# Patient Record
Sex: MALE | Race: WHITE | NOT HISPANIC OR LATINO
[De-identification: names, ages, dates, MRNs, and addresses within clinical notes are randomized per-mention and may not be internally consistent; named-entity substitution may affect disease eponyms.]

---

## 2021-06-21 PROBLEM — Z00.00 ENCOUNTER FOR PREVENTIVE HEALTH EXAMINATION: Status: ACTIVE | Noted: 2021-06-21

## 2021-06-22 ENCOUNTER — APPOINTMENT (OUTPATIENT)
Dept: GASTROENTEROLOGY | Facility: CLINIC | Age: 86
End: 2021-06-22
Payer: MEDICARE

## 2021-06-22 DIAGNOSIS — Z80.3 FAMILY HISTORY OF MALIGNANT NEOPLASM OF BREAST: ICD-10-CM

## 2021-06-22 DIAGNOSIS — Z86.79 PERSONAL HISTORY OF OTHER DISEASES OF THE CIRCULATORY SYSTEM: ICD-10-CM

## 2021-06-22 DIAGNOSIS — Z78.9 OTHER SPECIFIED HEALTH STATUS: ICD-10-CM

## 2021-06-22 DIAGNOSIS — Z87.898 PERSONAL HISTORY OF OTHER SPECIFIED CONDITIONS: ICD-10-CM

## 2021-06-22 DIAGNOSIS — Z85.46 PERSONAL HISTORY OF MALIGNANT NEOPLASM OF PROSTATE: ICD-10-CM

## 2021-06-22 DIAGNOSIS — R19.4 CHANGE IN BOWEL HABIT: ICD-10-CM

## 2021-06-22 DIAGNOSIS — Z86.010 PERSONAL HISTORY OF COLONIC POLYPS: ICD-10-CM

## 2021-06-22 PROCEDURE — 99203 OFFICE O/P NEW LOW 30 MIN: CPT

## 2021-06-22 RX ORDER — TAMSULOSIN HYDROCHLORIDE 0.4 MG/1
CAPSULE ORAL
Refills: 0 | Status: ACTIVE | COMMUNITY

## 2021-06-22 RX ORDER — NEBIVOLOL HYDROCHLORIDE 20 MG/1
TABLET ORAL
Refills: 0 | Status: ACTIVE | COMMUNITY

## 2021-06-23 PROBLEM — R19.4 CHANGE IN BOWEL HABITS: Status: ACTIVE | Noted: 2021-06-23

## 2021-06-23 NOTE — REASON FOR VISIT
[Initial Evaluation] : an initial evaluation [FreeTextEntry1] : History of colonic polyps, change in bowel habits

## 2021-06-23 NOTE — HISTORY OF PRESENT ILLNESS
[FreeTextEntry1] : The patient is an 85-year-old man with a history of prostate cancer treated with CyberKnife who has a history of colonic polyps.  His last colonoscopy was in May 2016.  He feels well denying abdominal pain, nausea, vomiting, heartburn, dysphagia.  He has 1 bowel movement a day, which she notes is harder to pass and sometimes thin.  He denies melena or bright red blood per rectum.  The patient has gained 7 to 8 pounds since the pandemic began.  The patient has not been admitted to the hospital in the past year and denies any cardiac issues.

## 2021-06-23 NOTE — ASSESSMENT
[FreeTextEntry1] : Patient with a history of colonic polyps and a change in bowel habits with stools that are harder to pass and sometimes thin.\par \par We discussed the pros and cons of proceeding with colonoscopy at the patient's age.  The patient wishes to proceed and he appears to be in good physical condition.  A colonoscopy has been scheduled. The risks, benefits, alternatives, and limitations of the procedure, including the possibility of missed lesions, were explained.

## 2021-06-23 NOTE — CONSULT LETTER
[FreeTextEntry1] : Dear Dr. Mehul Jaimes,\[facility] \[facility] I had the pleasure of seeing your patient CELESTINO BATISTA in the office today.  My office note is attached. PLEASE READ THE "ASSESSMENT" SECTION OF THE NOTE TO SEE MY IMPRESSION AND PLAN.\par \par Thank you very much for allowing me to participate in the care of your patient.\par \[facility] Sincerely,\[facility] \[facility] Willis Latham M.D., FAC, FACP\[facility] Director, Celiac Program at [facility]  of Medicine\[facility] and [employer] at [facility] Practice Director,\[facility] - Gastroenterology/Internal Medicine at [address]\[place] [address]\[facility] Tel: [phone]\[facility] Email: [email or web address] \[facility] \[facility] The attached note has been created using a voice recognition system (Dragon).  There may be some misspellings and typos.  Please call my office if you have any issues or questions.

## 2021-09-29 DIAGNOSIS — Z20.822 CONTACT WITH AND (SUSPECTED) EXPOSURE TO COVID-19: ICD-10-CM

## 2021-09-29 DIAGNOSIS — Z01.818 ENCOUNTER FOR OTHER PREPROCEDURAL EXAMINATION: ICD-10-CM

## 2021-10-08 ENCOUNTER — APPOINTMENT (OUTPATIENT)
Dept: DISASTER EMERGENCY | Facility: CLINIC | Age: 86
End: 2021-10-08

## 2021-10-11 LAB — SARS-COV-2 N GENE NPH QL NAA+PROBE: NOT DETECTED

## 2021-10-12 ENCOUNTER — APPOINTMENT (OUTPATIENT)
Dept: GASTROENTEROLOGY | Facility: AMBULATORY MEDICAL SERVICES | Age: 86
End: 2021-10-12
Payer: MEDICARE

## 2021-10-12 PROCEDURE — 45385 COLONOSCOPY W/LESION REMOVAL: CPT

## 2021-12-20 ENCOUNTER — APPOINTMENT (OUTPATIENT)
Dept: GASTROENTEROLOGY | Facility: CLINIC | Age: 86
End: 2021-12-20
Payer: MEDICARE

## 2021-12-20 VITALS
SYSTOLIC BLOOD PRESSURE: 120 MMHG | HEART RATE: 88 BPM | TEMPERATURE: 98.2 F | OXYGEN SATURATION: 97 % | BODY MASS INDEX: 27.6 KG/M2 | DIASTOLIC BLOOD PRESSURE: 70 MMHG | HEIGHT: 62 IN | WEIGHT: 150 LBS

## 2021-12-20 DIAGNOSIS — K57.30 DIVERTICULOSIS OF LARGE INTESTINE W/OUT PERFORATION OR ABSCESS W/OUT BLEEDING: ICD-10-CM

## 2021-12-20 DIAGNOSIS — D12.6 BENIGN NEOPLASM OF COLON, UNSPECIFIED: ICD-10-CM

## 2021-12-20 DIAGNOSIS — K59.00 CONSTIPATION, UNSPECIFIED: ICD-10-CM

## 2021-12-20 DIAGNOSIS — K64.4 RESIDUAL HEMORRHOIDAL SKIN TAGS: ICD-10-CM

## 2021-12-20 DIAGNOSIS — K64.8 OTHER HEMORRHOIDS: ICD-10-CM

## 2021-12-20 PROCEDURE — 99213 OFFICE O/P EST LOW 20 MIN: CPT

## 2021-12-20 RX ORDER — SODIUM SULFATE, MAGNESIUM SULFATE, AND POTASSIUM CHLORIDE 17.75; 2.7; 2.25 G/1; G/1; G/1
1479-225-188 TABLET ORAL
Qty: 1 | Refills: 0 | Status: DISCONTINUED | COMMUNITY
Start: 2021-09-29 | End: 2021-12-20

## 2021-12-20 RX ORDER — SODIUM SULFATE, POTASSIUM SULFATE, MAGNESIUM SULFATE 17.5; 3.13; 1.6 G/ML; G/ML; G/ML
17.5-3.13-1.6 SOLUTION, CONCENTRATE ORAL
Qty: 1 | Refills: 0 | Status: DISCONTINUED | COMMUNITY
Start: 2021-06-22 | End: 2021-12-20

## 2021-12-20 NOTE — ASSESSMENT
[FreeTextEntry1] : Patient with adenomatous colonic polyps and diverticulosis.  He has occasional constipation for which he takes MiraLAX and prune juice intermittently.\par \par Information was given to the patient regarding diverticulosis and a high-fiber diet.\par \par Given the patient's age, he will not require any further colonoscopic surveillance.\par \par Patient was advised to continue using MiraLAX and prune juice as needed.\par \par Patient will return to see me in 1 year or sooner if needed.\par \par \par Plan from 6/22/2021 - Patient with a history of colonic polyps and a change in bowel habits with stools that are harder to pass and sometimes thin.\par \par We discussed the pros and cons of proceeding with colonoscopy at the patient's age.  The patient wishes to proceed and he appears to be in good physical condition.  A colonoscopy has been scheduled. The risks, benefits, alternatives, and limitations of the procedure, including the possibility of missed lesions, were explained.

## 2021-12-20 NOTE — CONSULT LETTER
[FreeTextEntry1] : Dear Dr. Mehul Jaimes,\[facility] \[facility] I had the pleasure of seeing your patient CELESTINO BATISTA in the office today.  My office note is attached. PLEASE READ THE "ASSESSMENT" SECTION OF THE NOTE TO SEE MY IMPRESSION AND PLAN.\par \par Thank you very much for allowing me to participate in the care of your patient.\par \[facility] Sincerely,\[facility] \[facility] Willis Latham M.D., FAC, FACP\[facility] Director, Celiac Program at [facility]  of Medicine\[facility] and [employer] at [facility] Practice Director,\[facility] - Gastroenterology/Internal Medicine at [address]\[place] [address]\[facility] Tel: [phone]\[facility] Email: [email or web address] \[facility] \[facility] The attached note has been created using a voice recognition system (Dragon).  There may be some misspellings and typos.  Please call my office if you have any issues or questions.

## 2021-12-20 NOTE — HISTORY OF PRESENT ILLNESS
[FreeTextEntry1] : We reviewed the patient's colonoscopy performed on October 12, 2021.  The examination was significant for removal of 2 adenomatous colonic polyps.  The patient also has moderate pandiverticulosis as well as internal and external hemorrhoids which are asymptomatic.  The patient feels well.  He gets occasional mild constipation for which he takes intermittent MiraLAX and occasional prune juice.  The patient denies abdominal pain, melena, bright red blood per rectum.\par \par \par Note from 6/22/2021 - The patient is an 85-year-old man with a history of prostate cancer treated with CyberKnife who has a history of colonic polyps.  His last colonoscopy was in May 2016.  He feels well denying abdominal pain, nausea, vomiting, heartburn, dysphagia.  He has 1 bowel movement a day, which she notes is harder to pass and sometimes thin.  He denies melena or bright red blood per rectum.  The patient has gained 7 to 8 pounds since the pandemic began.  The patient has not been admitted to the hospital in the past year and denies any cardiac issues.

## 2022-11-02 ENCOUNTER — NON-APPOINTMENT (OUTPATIENT)
Age: 87
End: 2022-11-02

## 2022-12-29 ENCOUNTER — INPATIENT (INPATIENT)
Facility: HOSPITAL | Age: 87
LOS: 3 days | Discharge: HOME HEALTH | End: 2023-01-02
Attending: INTERNAL MEDICINE | Admitting: INTERNAL MEDICINE
Payer: MEDICARE

## 2022-12-29 VITALS
TEMPERATURE: 98 F | DIASTOLIC BLOOD PRESSURE: 68 MMHG | OXYGEN SATURATION: 98 % | SYSTOLIC BLOOD PRESSURE: 124 MMHG | RESPIRATION RATE: 16 BRPM | HEART RATE: 84 BPM

## 2022-12-29 PROCEDURE — 99285 EMERGENCY DEPT VISIT HI MDM: CPT | Mod: GC

## 2022-12-29 PROCEDURE — 93010 ELECTROCARDIOGRAM REPORT: CPT

## 2022-12-29 RX ORDER — PIPERACILLIN AND TAZOBACTAM 4; .5 G/20ML; G/20ML
3.38 INJECTION, POWDER, LYOPHILIZED, FOR SOLUTION INTRAVENOUS ONCE
Refills: 0 | Status: DISCONTINUED | OUTPATIENT
Start: 2022-12-29 | End: 2022-12-29

## 2022-12-29 RX ORDER — VANCOMYCIN HCL 1 G
1000 VIAL (EA) INTRAVENOUS ONCE
Refills: 0 | Status: COMPLETED | OUTPATIENT
Start: 2022-12-29 | End: 2022-12-29

## 2022-12-29 RX ORDER — ACETAMINOPHEN 500 MG
975 TABLET ORAL ONCE
Refills: 0 | Status: COMPLETED | OUTPATIENT
Start: 2022-12-29 | End: 2022-12-29

## 2022-12-29 RX ORDER — PIPERACILLIN AND TAZOBACTAM 4; .5 G/20ML; G/20ML
3.38 INJECTION, POWDER, LYOPHILIZED, FOR SOLUTION INTRAVENOUS EVERY 8 HOURS
Refills: 0 | Status: DISCONTINUED | OUTPATIENT
Start: 2022-12-29 | End: 2023-01-01

## 2022-12-29 RX ADMIN — Medication 250 MILLIGRAM(S): at 23:46

## 2022-12-29 RX ADMIN — Medication 975 MILLIGRAM(S): at 23:55

## 2022-12-29 NOTE — ED ADULT NURSE NOTE - NSIMPLEMENTINTERV_GEN_ALL_ED
Implemented All Universal Safety Interventions:  [place] to call system. Call bell, personal items and telephone within reach. Instruct patient to call for assistance. Room bathroom lighting operational. Non-slip footwear when patient is off stretcher. Physically safe environment: no spills, clutter or unnecessary equipment. Stretcher in lowest position, wheels locked, appropriate side rails in place.

## 2022-12-29 NOTE — ED ADULT NURSE NOTE - CHIEF COMPLAINT QUOTE
[facility] and [facility]. Arrived w/ IV on right arm. PMH prostate ca on chemo and immunotherapy, last tx Tuesday

## 2022-12-29 NOTE — ED ADULT NURSE NOTE - OBJECTIVE STATEMENT
Patient received in room 16. Patient A&Ox4 and ambulatory at baseline. Patient referred to the ED by [facility] provider for hypernatremia and dizziness. 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 Patient denies nausea, vomiting, headache, dyspnea, shortness of breath, and chest pain at this time. VSS, airway patent, chest rise equal bilaterally, lung sounds clear and equal bilaterally, respirations unlabored. Abdomen soft and non-distended, healing scab noted to right upper quadrant from previous surgery. Skin warm to touch, pink, and dry. 20G IV placed to left AC, labs collected and sent, medications administered as prescribed. Patient resting in stretcher with family at besides, safety measures in place, awaiting MD orders.

## 2022-12-29 NOTE — CHART NOTE - NSCHARTNOTEFT_GEN_A_CORE
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 If patient is admitted please admit to Dr Otero and Oncology will follow along with [facility]

## 2022-12-29 NOTE — ED ADULT TRIAGE NOTE - CHIEF COMPLAINT QUOTE
[facility] [place] [facility] and dizz[facility]. Arrived w/ IV on right arm. PMH prostate ca on chemo and immunotherapy, last tx Tuesday

## 2022-12-30 DIAGNOSIS — I10 ESSENTIAL (PRIMARY) HYPERTENSION: ICD-10-CM

## 2022-12-30 DIAGNOSIS — Z29.9 ENCOUNTER FOR PROPHYLACTIC MEASURES, UNSPECIFIED: ICD-10-CM

## 2022-12-30 DIAGNOSIS — C22.1 INTRAHEPATIC BILE DUCT CARCINOMA: ICD-10-CM

## 2022-12-30 DIAGNOSIS — N39.0 URINARY TRACT INFECTION, SITE NOT SPECIFIED: ICD-10-CM

## 2022-12-30 DIAGNOSIS — A41.9 SEPSIS, UNSPECIFIED ORGANISM: ICD-10-CM

## 2022-12-30 DIAGNOSIS — E87.1 HYPO-OSMOLALITY AND HYPONATREMIA: ICD-10-CM

## 2022-12-30 LAB
ALBUMIN SERPL ELPH-MCNC: 3.3 G/DL — SIGNIFICANT CHANGE UP (ref 3.3–5)
ALP SERPL-CCNC: 70 U/L — SIGNIFICANT CHANGE UP (ref 40–120)
ALT FLD-CCNC: 27 U/L — SIGNIFICANT CHANGE UP (ref 4–41)
ANION GAP SERPL CALC-SCNC: 11 MMOL/L — SIGNIFICANT CHANGE UP (ref 7–14)
ANION GAP SERPL CALC-SCNC: 13 MMOL/L — SIGNIFICANT CHANGE UP (ref 7–14)
ANION GAP SERPL CALC-SCNC: 9 MMOL/L — SIGNIFICANT CHANGE UP (ref 7–14)
APPEARANCE UR: ABNORMAL
APTT BLD: 25.9 SEC — LOW (ref 27–36.3)
AST SERPL-CCNC: 27 U/L — SIGNIFICANT CHANGE UP (ref 4–40)
B PERT DNA SPEC QL NAA+PROBE: SIGNIFICANT CHANGE UP
B PERT+PARAPERT DNA PNL SPEC NAA+PROBE: SIGNIFICANT CHANGE UP
BACTERIA # UR AUTO: NEGATIVE — SIGNIFICANT CHANGE UP
BASE EXCESS BLDV CALC-SCNC: 0.6 MMOL/L — SIGNIFICANT CHANGE UP (ref -2–3)
BASOPHILS # BLD AUTO: 0 K/UL — SIGNIFICANT CHANGE UP (ref 0–0.2)
BASOPHILS # BLD AUTO: 0.02 K/UL — SIGNIFICANT CHANGE UP (ref 0–0.2)
BASOPHILS NFR BLD AUTO: 0 % — SIGNIFICANT CHANGE UP (ref 0–2)
BASOPHILS NFR BLD AUTO: 0.4 % — SIGNIFICANT CHANGE UP (ref 0–2)
BILIRUB SERPL-MCNC: 0.9 MG/DL — SIGNIFICANT CHANGE UP (ref 0.2–1.2)
BILIRUB UR-MCNC: NEGATIVE — SIGNIFICANT CHANGE UP
BLOOD GAS VENOUS COMPREHENSIVE RESULT: SIGNIFICANT CHANGE UP
BORDETELLA PARAPERTUSSIS (RAPRVP): SIGNIFICANT CHANGE UP
BUN SERPL-MCNC: 14 MG/DL — SIGNIFICANT CHANGE UP (ref 7–23)
BUN SERPL-MCNC: 14 MG/DL — SIGNIFICANT CHANGE UP (ref 7–23)
BUN SERPL-MCNC: 15 MG/DL — SIGNIFICANT CHANGE UP (ref 7–23)
C PNEUM DNA SPEC QL NAA+PROBE: SIGNIFICANT CHANGE UP
CALCIUM SERPL-MCNC: 7.9 MG/DL — LOW (ref 8.4–10.5)
CALCIUM SERPL-MCNC: 8 MG/DL — LOW (ref 8.4–10.5)
CALCIUM SERPL-MCNC: 8.6 MG/DL — SIGNIFICANT CHANGE UP (ref 8.4–10.5)
CHLORIDE BLDV-SCNC: 89 MMOL/L — LOW (ref 96–108)
CHLORIDE SERPL-SCNC: 88 MMOL/L — LOW (ref 98–107)
CHLORIDE SERPL-SCNC: 89 MMOL/L — LOW (ref 98–107)
CHLORIDE SERPL-SCNC: 93 MMOL/L — LOW (ref 98–107)
CO2 BLDV-SCNC: 25.6 MMOL/L — SIGNIFICANT CHANGE UP (ref 22–26)
CO2 SERPL-SCNC: 22 MMOL/L — SIGNIFICANT CHANGE UP (ref 22–31)
CO2 SERPL-SCNC: 23 MMOL/L — SIGNIFICANT CHANGE UP (ref 22–31)
CO2 SERPL-SCNC: 25 MMOL/L — SIGNIFICANT CHANGE UP (ref 22–31)
COLOR SPEC: SIGNIFICANT CHANGE UP
CREAT SERPL-MCNC: 1.2 MG/DL — SIGNIFICANT CHANGE UP (ref 0.5–1.3)
CREAT SERPL-MCNC: 1.21 MG/DL — SIGNIFICANT CHANGE UP (ref 0.5–1.3)
CREAT SERPL-MCNC: 1.24 MG/DL — SIGNIFICANT CHANGE UP (ref 0.5–1.3)
DIFF PNL FLD: ABNORMAL
EGFR: 57 ML/MIN/1.73M2 — LOW
EGFR: 59 ML/MIN/1.73M2 — LOW
EGFR: 59 ML/MIN/1.73M2 — LOW
EOSINOPHIL # BLD AUTO: 0 K/UL — SIGNIFICANT CHANGE UP (ref 0–0.5)
EOSINOPHIL # BLD AUTO: 0.02 K/UL — SIGNIFICANT CHANGE UP (ref 0–0.5)
EOSINOPHIL NFR BLD AUTO: 0 % — SIGNIFICANT CHANGE UP (ref 0–6)
EOSINOPHIL NFR BLD AUTO: 0.4 % — SIGNIFICANT CHANGE UP (ref 0–6)
EPI CELLS # UR: 1 /HPF — SIGNIFICANT CHANGE UP (ref 0–5)
FLUAV SUBTYP SPEC NAA+PROBE: SIGNIFICANT CHANGE UP
FLUBV RNA SPEC QL NAA+PROBE: SIGNIFICANT CHANGE UP
GAS PNL BLDV: 120 MMOL/L — CRITICAL LOW (ref 136–145)
GIANT PLATELETS BLD QL SMEAR: PRESENT — SIGNIFICANT CHANGE UP
GLUCOSE BLDV-MCNC: 108 MG/DL — HIGH (ref 70–99)
GLUCOSE SERPL-MCNC: 116 MG/DL — HIGH (ref 70–99)
GLUCOSE SERPL-MCNC: 120 MG/DL — HIGH (ref 70–99)
GLUCOSE SERPL-MCNC: 156 MG/DL — HIGH (ref 70–99)
GLUCOSE UR QL: NEGATIVE — SIGNIFICANT CHANGE UP
HADV DNA SPEC QL NAA+PROBE: SIGNIFICANT CHANGE UP
HCO3 BLDV-SCNC: 24 MMOL/L — SIGNIFICANT CHANGE UP (ref 22–29)
HCOV 229E RNA SPEC QL NAA+PROBE: SIGNIFICANT CHANGE UP
HCOV HKU1 RNA SPEC QL NAA+PROBE: SIGNIFICANT CHANGE UP
HCOV NL63 RNA SPEC QL NAA+PROBE: SIGNIFICANT CHANGE UP
HCOV OC43 RNA SPEC QL NAA+PROBE: SIGNIFICANT CHANGE UP
HCT VFR BLD CALC: 32.3 % — LOW (ref 39–50)
HCT VFR BLD CALC: 35.7 % — LOW (ref 39–50)
HCT VFR BLDA CALC: 38 % — LOW (ref 39–51)
HGB BLD CALC-MCNC: 12.8 G/DL — LOW (ref 13–17)
HGB BLD-MCNC: 11.3 G/DL — LOW (ref 13–17)
HGB BLD-MCNC: 12.3 G/DL — LOW (ref 13–17)
HMPV RNA SPEC QL NAA+PROBE: SIGNIFICANT CHANGE UP
HPIV1 RNA SPEC QL NAA+PROBE: SIGNIFICANT CHANGE UP
HPIV2 RNA SPEC QL NAA+PROBE: SIGNIFICANT CHANGE UP
HPIV3 RNA SPEC QL NAA+PROBE: SIGNIFICANT CHANGE UP
HPIV4 RNA SPEC QL NAA+PROBE: SIGNIFICANT CHANGE UP
HYALINE CASTS # UR AUTO: 1 /LPF — SIGNIFICANT CHANGE UP (ref 0–7)
IANC: 2.54 K/UL — SIGNIFICANT CHANGE UP (ref 1.8–7.4)
IANC: 3.48 K/UL — SIGNIFICANT CHANGE UP (ref 1.8–7.4)
IMM GRANULOCYTES NFR BLD AUTO: 1.1 % — HIGH (ref 0–0.9)
INR BLD: 1.52 RATIO — HIGH (ref 0.88–1.16)
KETONES UR-MCNC: NEGATIVE — SIGNIFICANT CHANGE UP
LACTATE BLDV-MCNC: 2.1 MMOL/L — HIGH (ref 0.5–2)
LACTATE SERPL-SCNC: 1.1 MMOL/L — SIGNIFICANT CHANGE UP (ref 0.5–2)
LEUKOCYTE ESTERASE UR-ACNC: ABNORMAL
LYMPHOCYTES # BLD AUTO: 0.56 K/UL — LOW (ref 1–3.3)
LYMPHOCYTES # BLD AUTO: 1.31 K/UL — SIGNIFICANT CHANGE UP (ref 1–3.3)
LYMPHOCYTES # BLD AUTO: 13.9 % — SIGNIFICANT CHANGE UP (ref 13–44)
LYMPHOCYTES # BLD AUTO: 23.6 % — SIGNIFICANT CHANGE UP (ref 13–44)
M PNEUMO DNA SPEC QL NAA+PROBE: SIGNIFICANT CHANGE UP
MAGNESIUM SERPL-MCNC: 1.8 MG/DL — SIGNIFICANT CHANGE UP (ref 1.6–2.6)
MCHC RBC-ENTMCNC: 30.1 PG — SIGNIFICANT CHANGE UP (ref 27–34)
MCHC RBC-ENTMCNC: 30.1 PG — SIGNIFICANT CHANGE UP (ref 27–34)
MCHC RBC-ENTMCNC: 34.5 GM/DL — SIGNIFICANT CHANGE UP (ref 32–36)
MCHC RBC-ENTMCNC: 35 GM/DL — SIGNIFICANT CHANGE UP (ref 32–36)
MCV RBC AUTO: 86.1 FL — SIGNIFICANT CHANGE UP (ref 80–100)
MCV RBC AUTO: 87.5 FL — SIGNIFICANT CHANGE UP (ref 80–100)
MONOCYTES # BLD AUTO: 0.6 K/UL — SIGNIFICANT CHANGE UP (ref 0–0.9)
MONOCYTES # BLD AUTO: 0.65 K/UL — SIGNIFICANT CHANGE UP (ref 0–0.9)
MONOCYTES NFR BLD AUTO: 11.7 % — SIGNIFICANT CHANGE UP (ref 2–14)
MONOCYTES NFR BLD AUTO: 14.8 % — HIGH (ref 2–14)
NEUTROPHILS # BLD AUTO: 2.7 K/UL — SIGNIFICANT CHANGE UP (ref 1.8–7.4)
NEUTROPHILS # BLD AUTO: 3.48 K/UL — SIGNIFICANT CHANGE UP (ref 1.8–7.4)
NEUTROPHILS NFR BLD AUTO: 58.3 % — SIGNIFICANT CHANGE UP (ref 43–77)
NEUTROPHILS NFR BLD AUTO: 62.8 % — SIGNIFICANT CHANGE UP (ref 43–77)
NEUTS BAND # BLD: 8.7 % — HIGH (ref 0–6)
NITRITE UR-MCNC: POSITIVE
NRBC # BLD: 0 /100 WBCS — SIGNIFICANT CHANGE UP (ref 0–0)
NRBC # FLD: 0 K/UL — SIGNIFICANT CHANGE UP (ref 0–0)
OSMOLALITY SERPL: 259 MOSM/KG — LOW (ref 275–295)
OSMOLALITY UR: 256 MOSM/KG — SIGNIFICANT CHANGE UP (ref 50–1200)
OVALOCYTES BLD QL SMEAR: SLIGHT — SIGNIFICANT CHANGE UP
PCO2 BLDV: 36 MMHG — LOW (ref 42–55)
PH BLDV: 7.44 — HIGH (ref 7.32–7.43)
PH UR: 6.5 — SIGNIFICANT CHANGE UP (ref 5–8)
PHOSPHATE SERPL-MCNC: 2.9 MG/DL — SIGNIFICANT CHANGE UP (ref 2.5–4.5)
PLAT MORPH BLD: NORMAL — SIGNIFICANT CHANGE UP
PLATELET # BLD AUTO: 203 K/UL — SIGNIFICANT CHANGE UP (ref 150–400)
PLATELET # BLD AUTO: 291 K/UL — SIGNIFICANT CHANGE UP (ref 150–400)
PLATELET COUNT - ESTIMATE: NORMAL — SIGNIFICANT CHANGE UP
PO2 BLDV: 36 MMHG — SIGNIFICANT CHANGE UP
POIKILOCYTOSIS BLD QL AUTO: SLIGHT — SIGNIFICANT CHANGE UP
POLYCHROMASIA BLD QL SMEAR: SLIGHT — SIGNIFICANT CHANGE UP
POTASSIUM BLDV-SCNC: 3.4 MMOL/L — LOW (ref 3.5–5.1)
POTASSIUM SERPL-MCNC: 3.5 MMOL/L — SIGNIFICANT CHANGE UP (ref 3.5–5.3)
POTASSIUM SERPL-MCNC: 3.5 MMOL/L — SIGNIFICANT CHANGE UP (ref 3.5–5.3)
POTASSIUM SERPL-MCNC: 3.7 MMOL/L — SIGNIFICANT CHANGE UP (ref 3.5–5.3)
POTASSIUM SERPL-SCNC: 3.5 MMOL/L — SIGNIFICANT CHANGE UP (ref 3.5–5.3)
POTASSIUM SERPL-SCNC: 3.5 MMOL/L — SIGNIFICANT CHANGE UP (ref 3.5–5.3)
POTASSIUM SERPL-SCNC: 3.7 MMOL/L — SIGNIFICANT CHANGE UP (ref 3.5–5.3)
PROT SERPL-MCNC: 6.8 G/DL — SIGNIFICANT CHANGE UP (ref 6–8.3)
PROT UR-MCNC: ABNORMAL
PROTHROM AB SERPL-ACNC: 17.7 SEC — HIGH (ref 10.5–13.4)
RAPID RVP RESULT: SIGNIFICANT CHANGE UP
RBC # BLD: 3.75 M/UL — LOW (ref 4.2–5.8)
RBC # BLD: 4.08 M/UL — LOW (ref 4.2–5.8)
RBC # FLD: 12.1 % — SIGNIFICANT CHANGE UP (ref 10.3–14.5)
RBC # FLD: 12.3 % — SIGNIFICANT CHANGE UP (ref 10.3–14.5)
RBC BLD AUTO: NORMAL — SIGNIFICANT CHANGE UP
RBC CASTS # UR COMP ASSIST: 10 /HPF — HIGH (ref 0–4)
RSV RNA SPEC QL NAA+PROBE: SIGNIFICANT CHANGE UP
RV+EV RNA SPEC QL NAA+PROBE: SIGNIFICANT CHANGE UP
SAO2 % BLDV: 65.5 % — SIGNIFICANT CHANGE UP
SARS-COV-2 RNA SPEC QL NAA+PROBE: SIGNIFICANT CHANGE UP
SODIUM SERPL-SCNC: 122 MMOL/L — LOW (ref 135–145)
SODIUM SERPL-SCNC: 124 MMOL/L — LOW (ref 135–145)
SODIUM SERPL-SCNC: 127 MMOL/L — LOW (ref 135–145)
SP GR SPEC: 1.01 — SIGNIFICANT CHANGE UP (ref 1.01–1.05)
UROBILINOGEN FLD QL: SIGNIFICANT CHANGE UP
VARIANT LYMPHS # BLD: 4.3 % — SIGNIFICANT CHANGE UP (ref 0–6)
WBC # BLD: 4.03 K/UL — SIGNIFICANT CHANGE UP (ref 3.8–10.5)
WBC # BLD: 5.54 K/UL — SIGNIFICANT CHANGE UP (ref 3.8–10.5)
WBC # FLD AUTO: 4.03 K/UL — SIGNIFICANT CHANGE UP (ref 3.8–10.5)
WBC # FLD AUTO: 5.54 K/UL — SIGNIFICANT CHANGE UP (ref 3.8–10.5)
WBC UR QL: >720 /HPF — HIGH (ref 0–5)

## 2022-12-30 PROCEDURE — 99223 1ST HOSP IP/OBS HIGH 75: CPT

## 2022-12-30 PROCEDURE — 51702 INSERT TEMP BLADDER CATH: CPT

## 2022-12-30 PROCEDURE — 71045 X-RAY EXAM CHEST 1 VIEW: CPT | Mod: 26

## 2022-12-30 RX ORDER — LANOLIN ALCOHOL/MO/W.PET/CERES
3 CREAM (GRAM) TOPICAL AT BEDTIME
Refills: 0 | Status: DISCONTINUED | OUTPATIENT
Start: 2022-12-30 | End: 2023-01-02

## 2022-12-30 RX ORDER — TAMSULOSIN HYDROCHLORIDE 0.4 MG/1
0.4 CAPSULE ORAL
Refills: 0 | Status: DISCONTINUED | OUTPATIENT
Start: 2022-12-30 | End: 2023-01-02

## 2022-12-30 RX ORDER — SODIUM CHLORIDE 9 MG/ML
250 INJECTION INTRAMUSCULAR; INTRAVENOUS; SUBCUTANEOUS ONCE
Refills: 0 | Status: COMPLETED | OUTPATIENT
Start: 2022-12-30 | End: 2022-12-30

## 2022-12-30 RX ORDER — ENOXAPARIN SODIUM 100 MG/ML
40 INJECTION SUBCUTANEOUS EVERY 24 HOURS
Refills: 0 | Status: DISCONTINUED | OUTPATIENT
Start: 2022-12-30 | End: 2023-01-02

## 2022-12-30 RX ORDER — ONDANSETRON 8 MG/1
4 TABLET, FILM COATED ORAL EVERY 8 HOURS
Refills: 0 | Status: DISCONTINUED | OUTPATIENT
Start: 2022-12-30 | End: 2023-01-02

## 2022-12-30 RX ORDER — ACETAMINOPHEN 500 MG
650 TABLET ORAL EVERY 6 HOURS
Refills: 0 | Status: DISCONTINUED | OUTPATIENT
Start: 2022-12-30 | End: 2023-01-02

## 2022-12-30 RX ORDER — SODIUM CHLORIDE 5 G/100ML
500 INJECTION, SOLUTION INTRAVENOUS
Refills: 0 | Status: DISCONTINUED | OUTPATIENT
Start: 2022-12-30 | End: 2022-12-31

## 2022-12-30 RX ADMIN — PIPERACILLIN AND TAZOBACTAM 25 GRAM(S): 4; .5 INJECTION, POWDER, LYOPHILIZED, FOR SOLUTION INTRAVENOUS at 11:07

## 2022-12-30 RX ADMIN — SODIUM CHLORIDE 500 MILLILITER(S): 9 INJECTION INTRAMUSCULAR; INTRAVENOUS; SUBCUTANEOUS at 22:41

## 2022-12-30 RX ADMIN — Medication 650 MILLIGRAM(S): at 11:07

## 2022-12-30 RX ADMIN — Medication 650 MILLIGRAM(S): at 12:07

## 2022-12-30 RX ADMIN — SODIUM CHLORIDE 50 MILLILITER(S): 5 INJECTION, SOLUTION INTRAVENOUS at 18:05

## 2022-12-30 RX ADMIN — PIPERACILLIN AND TAZOBACTAM 25 GRAM(S): 4; .5 INJECTION, POWDER, LYOPHILIZED, FOR SOLUTION INTRAVENOUS at 18:06

## 2022-12-30 RX ADMIN — PIPERACILLIN AND TAZOBACTAM 25 GRAM(S): 4; .5 INJECTION, POWDER, LYOPHILIZED, FOR SOLUTION INTRAVENOUS at 02:14

## 2022-12-30 RX ADMIN — Medication 650 MILLIGRAM(S): at 19:06

## 2022-12-30 RX ADMIN — TAMSULOSIN HYDROCHLORIDE 0.4 MILLIGRAM(S): 0.4 CAPSULE ORAL at 18:09

## 2022-12-30 RX ADMIN — Medication 650 MILLIGRAM(S): at 18:06

## 2022-12-30 NOTE — H&P ADULT - HISTORY OF PRESENT ILLNESS
88 yo gentlemen with h/o stage IV cholangiocarcinoma mets to liver, last chemo ([place] + Cis + Durvalumab LD ) and HTN sent at the behest of his oncologist for hyponatremia and fevers. On admission patient found to have sepsis, presumably 2/2 UTI. Patient also complains of ongoing dizziness with nausea, however he denies nausea or vomiting. He also denies shortness of breath, abdominal pain, dysuria, diarrhea or sick contacts.         PAST MEDICAL & SURGICAL HISTORY:  Cholangiocarcinoma      Hypertension          Review of Systems:   CONSTITUTIONAL: No fever, weight loss, or fatigue  EYES: No eye pain, visual disturbances, or discharge  ENMT:  No difficulty hearing, tinnitus, vertigo; No sinus or throat pain  NECK: No pain or stiffness  RESPIRATORY: No cough, wheezing, chills or hemoptysis; No shortness of breath  CARDIOVASCULAR: No chest pain, palpitations, dizziness, or leg swelling  GASTROINTESTINAL: No abdominal or epigastric pain. No nausea, vomiting, or hematemesis; No diarrhea or constipation. No melena or hematochezia.  GENITOURINARY: No dysuria, frequency, hematuria, or incontinence  NEUROLOGICAL: No headaches, memory loss, loss of strength, numbness, or tremors  SKIN: No itching, burning, rashes, or lesions   LYMPH NODES: No enlarged glands  ENDOCRINE: No heat or cold intolerance; No hair loss  MUSCULOSKELETAL: No joint pain or swelling; No muscle, back, or extremity pain  PSYCHIATRIC: No depression, anxiety, mood swings, or difficulty sleeping  HEME/LYMPH: No easy bruising, or bleeding gums  ALLERGY AND IMMUNOLOGIC: No hives or eczema    Allergies    No Known Allergies    Intolerances        Social History: Denies active smoking, drinking or drug use.     FAMILY HISTORY: No pertinent family history.      MEDICATIONS  (STANDING):  piperacillin/tazobactam IVPB.. 3.375 Gram(s) IV Intermittent every 8 hours    MEDICATIONS  (PRN):      T(C): 36.9 (22 @ 05:01), Max: 38.4 (22 @ 22:44)  HR: 81 (22 @ 05:01) (73 - 98)  BP: 107/54 (22 @ 05:01) (98/60 - 133/73)  RR: 17 (22 @ 05:01) (16 - 22)  SpO2: 98% (12-30-22 @ 05:01) (98% - 100%)    CAPILLARY BLOOD GLUCOSE      POCT Blood Glucose.: 109 mg/dL (29 Dec 2022 21:08)    I&O's Summary      PHYSICAL EXAM:  GENERAL: NAD, well-developed  HEAD:  Atraumatic, Normocephalic  EYES: EOMI, PERRLA, conjunctiva and sclera clear  NECK: Supple, No elevated JVD  CHEST/LUNG: Clear to auscultation bilaterally; No wheeze  HEART: Regular rate and rhythm; No murmurs, rubs, or gallops  ABDOMEN: Soft, Nontender, Nondistended; Bowel sounds present  EXTREMITIES:  2+ Peripheral Pulses, No clubbing, cyanosis, or edema  PSYCH: AAOx3  NEUROLOGY: CN II-XII grossly intact, moving all extremities  SKIN: No rashes or lesions    LABS:                        12.3   4.03  )-----------( 203      ( 29 Dec 2022 23:48 )             35.7         122<L>  |  88<L>  |  15  ----------------------------<  116<H>  3.7   |  23  |  1.24    Ca    8.6      29 Dec 2022 23:48    TPro  6.8  /  Alb  3.3  /  TBili  0.9  /  DBili  x   /  AST  27  /  ALT  27  /  AlkPhos  70  12-    PT/INR - ( 29 Dec 2022 23:48 )   PT: 17.7 sec;   INR: 1.52 ratio         PTT - ( 29 Dec 2022 23:48 )  PTT:25.9 sec      Urinalysis Basic - ( 29 Dec 2022 23:50 )    Color: Light Yellow / Appearance: Turbid / S.013 / pH: x  Gluc: x / Ketone: Negative  / Bili: Negative / Urobili: <2 mg/dL   Blood: x / Protein: 100 mg/dL / Nitrite: Positive   Leuk Esterase: Large / RBC: 10 /HPF / WBC >720 /HPF   Sq Epi: x / Non Sq Epi: 1 /HPF / Bacteria: Occasional          RADIOLOGY & ADDITIONAL TESTS:    ECG Personally Reviewed -     Imaging Personally Reviewed:    Consultant(s) Notes Reviewed:      Care Discussed with Consultants/Other Providers:   86 yo gentlemen with h/o stage IV cholangiocarcinoma mets to liver, last chemo ([place] + Cis + Durvalumab LD ) and HTN sent at the behest of his oncologist for hyponatremia and fevers. On admission patient found to have sepsis, presumably 2/2 UTI. Patient also complains of ongoing dizziness with nausea, however he denies nausea or vomiting. He also denies shortness of breath, abdominal pain, dysuria, diarrhea or sick contacts.         PAST MEDICAL & SURGICAL HISTORY:  Cholangiocarcinoma      Hypertension          Review of Systems:   CONSTITUTIONAL: No fever, weight loss, or fatigue  EYES: No eye pain, visual disturbances, or discharge  ENMT:  No difficulty hearing, tinnitus, vertigo; No sinus or throat pain  NECK: No pain or stiffness  RESPIRATORY: No cough, wheezing, chills or hemoptysis; No shortness of breath  CARDIOVASCULAR: No chest pain, palpitations, dizziness, or leg swelling  GASTROINTESTINAL: No abdominal or epigastric pain. No nausea, vomiting, or hematemesis; No diarrhea or constipation. No melena or hematochezia.  GENITOURINARY: No dysuria, frequency, hematuria, or incontinence  NEUROLOGICAL: No headaches, memory loss, loss of strength, numbness, or tremors  SKIN: No itching, burning, rashes, or lesions   LYMPH NODES: No enlarged glands  ENDOCRINE: No heat or cold intolerance; No hair loss  MUSCULOSKELETAL: No joint pain or swelling; No muscle, back, or extremity pain  PSYCHIATRIC: No depression, anxiety, mood swings, or difficulty sleeping  HEME/LYMPH: No easy bruising, or bleeding gums  ALLERGY AND IMMUNOLOGIC: No hives or eczema    Allergies    No Known Allergies    Intolerances        Social History: Denies active smoking, drinking or drug use.     FAMILY HISTORY: No pertinent family history.      MEDICATIONS  (STANDING):  piperacillin/tazobactam IVPB.. 3.375 Gram(s) IV Intermittent every 8 hours    MEDICATIONS  (PRN):      T(C): 36.9 (22 @ 05:01), Max: 38.4 (22 @ 22:44)  HR: 81 (22 @ 05:01) (73 - 98)  BP: 107/54 (22 @ 05:01) (98/60 - 133/73)  RR: 17 (22 @ 05:01) (16 - 22)  SpO2: 98% (12-30-22 @ 05:01) (98% - 100%)    CAPILLARY BLOOD GLUCOSE      POCT Blood Glucose.: 109 mg/dL (29 Dec 2022 21:08)    I&O's Summary      PHYSICAL EXAM:  GENERAL: NAD, well-developed, looks younger than his age  HEAD:  Atraumatic, Normocephalic  EYES: EOMI, PERRLA, conjunctiva and sclera clear  NECK: Supple, No elevated JVD  CHEST/LUNG: Clear to auscultation bilaterally; No wheeze  HEART: Regular rate and rhythm; No murmurs, rubs, or gallops  ABDOMEN: Soft, Nontender, Nondistended; Bowel sounds present  EXTREMITIES:  2+ Peripheral Pulses, No clubbing, cyanosis, or edema  PSYCH: AAOx3  NEUROLOGY: CN II-XII grossly intact, moving all extremities  SKIN: No rashes or lesions    LABS:                        12.3   4.03  )-----------( 203      ( 29 Dec 2022 23:48 )             35.7         122<L>  |  88<L>  |  15  ----------------------------<  116<H>  3.7   |  23  |  1.24    Ca    8.6      29 Dec 2022 23:48    TPro  6.8  /  Alb  3.3  /  TBili  0.9  /  DBili  x   /  AST  27  /  ALT  27  /  AlkPhos  70  -    PT/INR - ( 29 Dec 2022 23:48 )   PT: 17.7 sec;   INR: 1.52 ratio         PTT - ( 29 Dec 2022 23:48 )  PTT:25.9 sec      Urinalysis Basic - ( 29 Dec 2022 23:50 )    Color: Light Yellow / Appearance: Turbid / S.013 / pH: x  Gluc: x / Ketone: Negative  / Bili: Negative / Urobili: <2 mg/dL   Blood: x / Protein: 100 mg/dL / Nitrite: Positive   Leuk Esterase: Large / RBC: 10 /HPF / WBC >720 /HPF   Sq Epi: x / Non Sq Epi: 1 /HPF / Bacteria: Occasional          RADIOLOGY & ADDITIONAL TESTS:    ECG Personally Reviewed -     Imaging Personally Reviewed:    Consultant(s) Notes Reviewed:      Care Discussed with Consultants/Other Providers:

## 2022-12-30 NOTE — ED PROVIDER NOTE - ATTENDING CONTRIBUTION TO CARE
87-year-old male with a history of cholangiocarcinoma status post biliary stents and currently on chemo and immunotherapy (last 1 week ago) here for fever and hyponatremia.  Patient endorses 2 days of generalized weakness and fatigue associated with urinary frequency, urgency, hesitancy.  No HA, neck stiffness, uri sxs, cp, sob, cough, leg pain or swelling, rash.  Patient [facility] [place] [facility] and found to be hyponatremic to 120 and febrile to 101 Fahrenheit.  UA also positive and patient received a dose of IV Zosyn at 5pm and oral Tylenol at 2pm.      Well appearing, lying comfortably in stretcher, awake and alert, nontoxic.  Febrile, VSS.  NCAT neck supple.  Lungs cta bl.  Cards nl S1/S2, RRR, no MRG.  Abd soft ntnd, no cvat.  No pedal edema or calf tenderness.      Immunocompromised patient with fever–likely UTI given symptoms and positive UA.  Will also eval for pna, viral uri, bacteremia.  Will start broad-spectrum antibiotics, check labs including cultures, UA, chest x-ray, admit.

## 2022-12-30 NOTE — PROCEDURE NOTE - ADDITIONAL PROCEDURE DETAILS
Called to assist after hematuria upon straight cath for UR of 500cc, pt admitted w/ UTI    18fr coude placed easily in normal fashion with prompt return of translucent pink urine, no clots.  Hematuria most likely secondary to catheter trauma, would keep white in place for at least 24 to 48 hours once hematuria resolves, pt with normal BMs and ambulating

## 2022-12-30 NOTE — H&P ADULT - PROBLEM SELECTOR PLAN 1
Patient with sepsis on admission presumably 2/2 UTI  Cxs obtained, follow up results and treat accordingly   Continue antibiotics for now

## 2022-12-30 NOTE — ED PROVIDER NOTE - OBJECTIVE STATEMENT
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 Patient also found to have a UTI was given 1 dose of [place] [facility] Patient found to be febrile upon initial ED evaluation.  As per patient, he has been dizzy for the past day, associated with nausea, no vomiting.  No chest pain, SOB, cough, headache, abdominal pain, diarrhea, dysuria, vomiting.   Denies sick contacts.

## 2022-12-30 NOTE — H&P ADULT - PROBLEM SELECTOR PLAN 2
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

## 2022-12-30 NOTE — CONSULT NOTE ADULT - SUBJECTIVE AND OBJECTIVE BOX
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 On admission patient found to have sepsis, presumably 2/2 UTI. Patient also complains of ongoing dizziness with nausea, however he denies nausea or vomiting. He also denies shortness of breath, abdominal pain, dysuria, diarrhea or sick contacts.   nephrology consulted for hyponatremia        PAST MEDICAL & SURGICAL HISTORY:  Cholangiocarcinoma      Hypertension          Review of Systems:   CONSTITUTIONAL: No fever, weight loss, or fatigue  EYES: No eye pain, visual disturbances, or discharge  ENMT:  No difficulty hearing, tinnitus, vertigo; No sinus or throat pain  NECK: No pain or stiffness  RESPIRATORY: No cough, wheezing, chills or hemoptysis; No shortness of breath  CARDIOVASCULAR: No chest pain, palpitations, dizziness, or leg swelling  GASTROINTESTINAL: No abdominal or epigastric pain. No nausea, vomiting, or hematemesis; No diarrhea or constipation. No melena or hematochezia.  GENITOURINARY: No dysuria, frequency, hematuria, or incontinence  NEUROLOGICAL: +dizziness  SKIN: No itching, burning, rashes, or lesions   LYMPH NODES: No enlarged glands  ENDOCRINE: No heat or cold intolerance; No hair loss  MUSCULOSKELETAL: No joint pain or swelling; No muscle, back, or extremity pain  PSYCHIATRIC: No depression, anxiety, mood swings, or difficulty sleeping  HEME/LYMPH: No easy bruising, or bleeding gums  ALLERGY AND IMMUNOLOGIC: No hives or eczema    Allergies    No Known Allergies    Intolerances        Social History: Denies active smoking, drinking or drug use.     FAMILY HISTORY: No pertinent family history.      MEDICATIONS  (STANDING):  piperacillin/tazobactam IVPB.. 3.375 Gram(s) IV Intermittent every 8 hours    MEDICATIONS  (PRN):      T(C): 36.9 (22 @ 05:01), Max: 38.4 (22 @ 22:44)  HR: 81 (22 @ 05:01) (73 - 98)  BP: 107/54 (22 @ 05:01) (98/60 - 133/73)  RR: 17 (22 @ 05:01) (16 - 22)  SpO2: 98% (22 @ 05:01) (98% - 100%)    CAPILLARY BLOOD GLUCOSE      POCT Blood Glucose.: 109 mg/dL (29 Dec 2022 21:08)    I&O's Summary      PHYSICAL EXAM:  GENERAL: NAD, well-developed, looks younger than his age  HEAD:  Atraumatic, Normocephalic  EYES: EOMI, PERRLA, conjunctiva and sclera clear  NECK: Supple, No elevated JVD  CHEST/LUNG: Clear to auscultation bilaterally; No wheeze  HEART: Regular rate and rhythm; No murmurs, rubs, or gallops  ABDOMEN: Soft, Nontender, Nondistended; Bowel sounds present  EXTREMITIES:  2+ Peripheral Pulses, No clubbing, cyanosis, or edema  PSYCH: AAOx3  NEUROLOGY: CN II-XII grossly intact, moving all extremities  SKIN: No rashes or lesions    LABS:                        12.3   4.03  )-----------( 203      ( 29 Dec 2022 23:48 )             35.7         122<L>  |  88<L>  |  15  ----------------------------<  116<H>  3.7   |  23  |  1.24    Ca    8.6      29 Dec 2022 23:48    TPro  6.8  /  Alb  3.3  /  TBili  0.9  /  DBili  x   /  AST  27  /  ALT  27  /  AlkPhos  70  12-    PT/INR - ( 29 Dec 2022 23:48 )   PT: 17.7 sec;   INR: 1.52 ratio         PTT - ( 29 Dec 2022 23:48 )  PTT:25.9 sec      Urinalysis Basic - ( 29 Dec 2022 23:50 )    Color: Light Yellow / Appearance: Turbid / S.013 / pH: x  Gluc: x / Ketone: Negative  / Bili: Negative / Urobili: <2 mg/dL   Blood: x / Protein: 100 mg/dL / Nitrite: Positive   Leuk Esterase: Large / RBC: 10 /HPF / WBC >720 /HPF   Sq Epi: x / Non Sq Epi: 1 /HPF / Bacteria: Occasional          RADIOLOGY & ADDITIONAL TESTS:    ECG Personally Reviewed -     Imaging Personally Reviewed:    Consultant(s) Notes Reviewed:      Care Discussed with Consultants/Other Providers:   (30 Dec 2022 08:46)        Allergies:  No Known Allergies      PAST MEDICAL & SURGICAL HISTORY:  Cholangiocarcinoma      Hypertension          Home Medications Reviewed    Hospital Medications:   MEDICATIONS  (STANDING):  enoxaparin Injectable 40 milliGRAM(s) SubCutaneous every 24 hours  piperacillin/tazobactam IVPB.. 3.375 Gram(s) IV Intermittent every 8 hours  tamsulosin 0.4 milliGRAM(s) Oral two times a day      SOCIAL HISTORY:  Denies ETOh, Smoking,     FAMILY HISTORY:      REVIEW OF SYSTEMS:  CONSTITUTIONAL: No weakness, fevers or chills  EYES/ENT: No visual changes;  No vertigo or throat pain   NECK: No pain or stiffness  RESPIRATORY: No cough, wheezing, hemoptysis; No shortness of breath  CARDIOVASCULAR: No chest pain or palpitations.  GASTROINTESTINAL: No abdominal or epigastric pain. No nausea, vomiting, or hematemesis; No diarrhea or constipation. No melena or hematochezia.  GENITOURINARY: No dysuria, frequency, foamy urine, urinary urgency, incontinence or hematuria  NEUROLOGICAL: No numbness or weakness  SKIN: No itching, burning, rashes, or lesions   VASCULAR: No bilateral lower extremity edema.   All other review of systems is negative unless indicated above.    VITALS:  T(F): 100.5 (22 @ 10:30), Max: 101.2 (22 @ 22:44)  HR: 97 (22 @ 10:30)  BP: 128/64 (22 @ 10:30)  RR: 17 (22 @ 10:30)  SpO2: 98% (22 @ 10:30)  Wt(kg): --    Height (cm): 160 ( @ 11:48)  Weight (kg): 65.771 ( @ 11:48)  BMI (kg/m2): 25.7 ( @ 11:48)  BSA (m2): 1.69 ( @ 11:48)    PHYSICAL EXAM:  General: NAD  HEENT: anicteric sclera, oropharynx clear, MMM  Neck: No JVD  Respiratory: CTAB, no wheezes, rales or rhonchi  Cardiovascular: S1, S2, RRR  Gastrointestinal: BS+, soft, NT/ND  Extremities: No cyanosis or clubbing. No peripheral edema  Neurological: A/O x 3, no focal deficits  Psychiatric: Normal mood, normal affect  : No CVA tenderness. No white.   Skin: No rashes  Vascular Access:    LABS:      122<L>  |  88<L>  |  15  ----------------------------<  116<H>  3.7   |  23  |  1.24    Ca    8.6      29 Dec 2022 23:48    TPro  6.8  /  Alb  3.3  /  TBili  0.9  /  DBili      /  AST  27  /  ALT  27  /  AlkPhos  70      Creatinine Trend: 1.24 <--                        12.3   4.03  )-----------( 203      ( 29 Dec 2022 23:48 )             35.7     Urine Studies:  Urinalysis Basic - ( 29 Dec 2022 23:50 )    Color: Light Yellow / Appearance: Turbid / S.013 / pH:   Gluc:  / Ketone: Negative  / Bili: Negative / Urobili: <2 mg/dL   Blood:  / Protein: 100 mg/dL / Nitrite: Positive   Leuk Esterase: Large / RBC: 10 /HPF / WBC >720 /HPF   Sq Epi:  / Non Sq Epi: 1 /HPF / Bacteria: Occasional      Osmolality, Random Urine: 256 mosm/kg ( @ 00:15)      RADIOLOGY & ADDITIONAL STUDIES:                 [facility]   MD NAVEEN CARDENAS MD KRISTINE SOLTANPOUR, DO ANGELA WONG, PA        TEL:  OFFICE: [phone]  From 5pm-7am answering service [phone]    --- INITIAL RENAL CONSULT NOTE ---date of service 22 @ 13:53    HPI:  86 yo gentlemen with h/o 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 On admission patient found to have sepsis, presumably 2/2 UTI. Patient also complains of ongoing dizziness with nausea, however he denies nausea or vomiting. He also denies shortness of breath, abdominal pain, dysuria, diarrhea or sick contacts.   Nephrology consulted for hyponatremia.        PAST MEDICAL & SURGICAL HISTORY:  Cholangiocarcinoma  Hypertension          Review of Systems:   CONSTITUTIONAL: No fever, weight loss, or fatigue  EYES: No eye pain, visual disturbances, or discharge  ENMT:  No difficulty hearing, tinnitus, vertigo; No sinus or throat pain  NECK: No pain or stiffness  RESPIRATORY: No cough, wheezing, chills or hemoptysis; No shortness of breath  CARDIOVASCULAR: No chest pain, palpitations, dizziness, or leg swelling  GASTROINTESTINAL: No abdominal or epigastric pain. No nausea, vomiting, or hematemesis; No diarrhea or constipation. No melena or hematochezia.  GENITOURINARY: No dysuria, frequency, hematuria, or incontinence  NEUROLOGICAL: +dizziness  SKIN: No itching, burning, rashes, or lesions   LYMPH NODES: No enlarged glands  ENDOCRINE: No heat or cold intolerance; No hair loss  MUSCULOSKELETAL: No joint pain or swelling; No muscle, back, or extremity pain  PSYCHIATRIC: No depression, anxiety, mood swings, or difficulty sleeping  HEME/LYMPH: No easy bruising, or bleeding gums  ALLERGY AND IMMUNOLOGIC: No hives or eczema    Allergies    No Known Allergies    Intolerances        Social History: Denies active smoking, drinking or drug use.     FAMILY HISTORY: No pertinent family history.    Home Medications:  Bystolic 5 mg oral tablet: 1 tab(s) orally once a day (30 Dec 2022 09:58)  tamsulosin 0.4 mg oral capsule: 1 cap(s) orally once a day (30 Dec 2022 09:58)    MEDICATIONS  (STANDING):  piperacillin/tazobactam IVPB.. 3.375 Gram(s) IV Intermittent every 8 hours    MEDICATIONS  (PRN):      T(C): 36.9 (22 @ 05:01), Max: 38.4 (22 @ 22:44)  HR: 81 (22 @ 05:01) (73 - 98)  BP: 107/54 (22 @ 05:01) (98/60 - 133/73)  RR: 17 (22 @ 05:01) (16 - 22)  SpO2: 98% (22 @ 05:01) (98% - 100%)    CAPILLARY BLOOD GLUCOSE      POCT Blood Glucose.: 109 mg/dL (29 Dec 2022 21:08)    I&O's Summary      PHYSICAL EXAM:  GENERAL: NAD, well-developed, looks younger than his age  HEAD:  Atraumatic, Normocephalic  EYES: EOMI, PERRLA, conjunctiva and sclera clear  NECK: Supple, No elevated JVD  CHEST/LUNG: Clear to auscultation bilaterally; No wheeze  HEART: Regular rate and rhythm; No murmurs, rubs, or gallops  ABDOMEN: Soft, Nontender, Nondistended; Bowel sounds present  EXTREMITIES:  2+ Peripheral Pulses, No clubbing, cyanosis, or edema  PSYCH: AAOx3  NEUROLOGY: CN II-XII grossly intact, moving all extremities  SKIN: No rashes or lesions    LABS:                        12.3   4.03  )-----------( 203      ( 29 Dec 2022 23:48 )             35.7         122<L>  |  88<L>  |  15  ----------------------------<  116<H>  3.7   |  23  |  1.24    Ca    8.6      29 Dec 2022 23:48    TPro  6.8  /  Alb  3.3  /  TBili  0.9  /  DBili  x   /  AST  27  /  ALT  27  /  AlkPhos  70  12    PT/INR - ( 29 Dec 2022 23:48 )   PT: 17.7 sec;   INR: 1.52 ratio         PTT - ( 29 Dec 2022 23:48 )  PTT:25.9 sec      Urinalysis Basic - ( 29 Dec 2022 23:50 )    Color: Light Yellow / Appearance: Turbid / S.013 / pH: x  Gluc: x / Ketone: Negative  / Bili: Negative / Urobili: <2 mg/dL   Blood: x / Protein: 100 mg/dL / Nitrite: Positive   Leuk Esterase: Large / RBC: 10 /HPF / WBC >720 /HPF   Sq Epi: x / Non Sq Epi: 1 /HPF / Bacteria: Occasional          RADIOLOGY & ADDITIONAL TESTS:    ECG Personally Reviewed -     Imaging Personally Reviewed:    Consultant(s) Notes Reviewed:      Care Discussed with Consultants/Other Providers:   (30 Dec 2022 08:46)        Allergies:  No Known Allergies      PAST MEDICAL & SURGICAL HISTORY:  Cholangiocarcinoma      Hypertension          Home Medications Reviewed    Hospital Medications:   MEDICATIONS  (STANDING):  enoxaparin Injectable 40 milliGRAM(s) SubCutaneous every 24 hours  piperacillin/tazobactam IVPB.. 3.375 Gram(s) IV Intermittent every 8 hours  tamsulosin 0.4 milliGRAM(s) Oral two times a day      SOCIAL HISTORY:  Denies ETOh, Smoking,     FAMILY HISTORY:      REVIEW OF SYSTEMS:  CONSTITUTIONAL: No weakness, fevers or chills  EYES/ENT: No visual changes;  No vertigo or throat pain   NECK: No pain or stiffness  RESPIRATORY: No cough, wheezing, hemoptysis; No shortness of breath  CARDIOVASCULAR: No chest pain or palpitations.  GASTROINTESTINAL: No abdominal or epigastric pain. No nausea, vomiting, or hematemesis; No diarrhea or constipation. No melena or hematochezia.  GENITOURINARY: No dysuria, frequency, foamy urine, urinary urgency, incontinence or hematuria  NEUROLOGICAL: No numbness or weakness  SKIN: No itching, burning, rashes, or lesions   VASCULAR: No bilateral lower extremity edema.   All other review of systems is negative unless indicated above.    VITALS:  T(F): 100.5 (22 @ 10:30), Max: 101.2 (22 @ 22:44)  HR: 97 (22 @ 10:30)  BP: 128/64 (22 @ 10:30)  RR: 17 (22 @ 10:30)  SpO2: 98% (22 @ 10:30)  Wt(kg): --    Height (cm): 160 ( @ 11:48)  Weight (kg): 65.771 ( @ 11:48)  BMI (kg/m2): 25.7 ( @ 11:48)  BSA (m2): 1.69 ( @ 11:48)    PHYSICAL EXAM:  General: NAD  HEENT: anicteric sclera, oropharynx clear, MMM  Neck: No JVD  Respiratory: CTAB, no wheezes, rales or rhonchi  Cardiovascular: S1, S2, RRR  Gastrointestinal: BS+, soft, NT/ND  Extremities: No cyanosis or clubbing. No peripheral edema  Neurological: A/O x 3, no focal deficits  Psychiatric: Normal mood, normal affect  : No CVA tenderness. No white.   Skin: No rashes  Vascular Access:    LABS:      122<L>  |  88<L>  |  15  ----------------------------<  116<H>  3.7   |  23  |  1.24    Ca    8.6      29 Dec 2022 23:48    TPro  6.8  /  Alb  3.3  /  TBili  0.9  /  DBili      /  AST  27  /  ALT  27  /  AlkPhos  70      Creatinine Trend: 1.24 <--                        12.3   4.03  )-----------( 203      ( 29 Dec 2022 23:48 )             35.7     Urine Studies:  Urinalysis Basic - ( 29 Dec 2022 23:50 )    Color: Light Yellow / Appearance: Turbid / S.013 / pH:   Gluc:  / Ketone: Negative  / Bili: Negative / Urobili: <2 mg/dL   Blood:  / Protein: 100 mg/dL / Nitrite: Positive   Leuk Esterase: Large / RBC: 10 /HPF / WBC >720 /HPF   Sq Epi:  / Non Sq Epi: 1 /HPF / Bacteria: Occasional      Osmolality, Random Urine: 256 mosm/kg ( @ 00:15)      RADIOLOGY & ADDITIONAL STUDIES:  ACC: [id number] EXAM:  XR CHEST PORTABLE URGENT 1V                          PROCEDURE DATE:  2022          INTERPRETATION:  CLINICAL INFORMATION: Dizziness and febrile in the ED.   Evaluate for pneumonia.    TECHNIQUE: Frontal radiograph of the chest.    COMPARISON: No comparison available.    FINDINGS:    Low lung volumes likely accentuating lung markings.  No focal consolidation.  No pleural effusion or pneumothorax.  There is limited evaluation of the heart size and mediastinum on portable   technique.  No acute abnormality within visible osseous structures.      IMPRESSION:    No focal consolidation.    --- End of Report ---           JUAN J FRAIRE MD; Resident Radiologist  This document has been electronically signed.  ELIJAH COPPOLA MD; Attending Radiologist  This document has been electronically signed. Dec 30 2022  6:34AM

## 2022-12-30 NOTE — H&P ADULT - PROBLEM SELECTOR PLAN 5
Lovenox for DVT prophylaxis, patient higher risk for DVT, possibly hypercoagulable state due to advanced age and malignancy

## 2022-12-30 NOTE — CONSULT NOTE ADULT - SUBJECTIVE AND OBJECTIVE BOX
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 On admission patient found to have sepsis, presumably 2/2 UTI. Patient also complains of ongoing dizziness with nausea, however he denies nausea or vomiting. He also denies shortness of breath, abdominal pain, dysuria, diarrhea or sick contacts.     Hematology/Oncology consulted to see patient who is under care of Dr. Chadd Urbano of [facility] for the management of stage IV cholangiocarcinoma, metastatic to liver. Patient is receiving treatment with gemcitabine, cisplatin, and durvalumab (LD ). He presented to [facility] with complaints of dizziness, weakness, and dysuria, found to have +UA with sodium 122. He received IV fluids and Zosyn, then transferred here for further management. Patient seen this afternoon, with family members at bedside. He feels slightly better today. No new complaints.     PAST MEDICAL & SURGICAL HISTORY:  Cholangiocarcinoma      Hypertension          Review of Systems:   CONSTITUTIONAL: No fever, weight loss, or fatigue  EYES: No eye pain, visual disturbances, or discharge  ENMT:  No difficulty hearing, tinnitus, vertigo; No sinus or throat pain  NECK: No pain or stiffness  RESPIRATORY: No cough, wheezing, chills or hemoptysis; No shortness of breath  CARDIOVASCULAR: No chest pain, palpitations, dizziness, or leg swelling  GASTROINTESTINAL: No abdominal or epigastric pain. No nausea, vomiting, or hematemesis; No diarrhea or constipation. No melena or hematochezia.  GENITOURINARY: No dysuria, frequency, hematuria, or incontinence  NEUROLOGICAL: No headaches, memory loss, loss of strength, numbness, or tremors  SKIN: No itching, burning, rashes, or lesions   LYMPH NODES: No enlarged glands  ENDOCRINE: No heat or cold intolerance; No hair loss  MUSCULOSKELETAL: No joint pain or swelling; No muscle, back, or extremity pain  PSYCHIATRIC: No depression, anxiety, mood swings, or difficulty sleeping  HEME/LYMPH: No easy bruising, or bleeding gums  ALLERGY AND IMMUNOLOGIC: No hives or eczema    Allergies    No Known Allergies    Intolerances        Social History: Denies active smoking, drinking or drug use.     FAMILY HISTORY: No pertinent family history.      MEDICATIONS  (STANDING):  piperacillin/tazobactam IVPB.. 3.375 Gram(s) IV Intermittent every 8 hours    MEDICATIONS  (PRN):      T(C): 36.9 (22 @ 05:01), Max: 38.4 (22 @ 22:44)  HR: 81 (22 @ 05:01) (73 - 98)  BP: 107/54 (22 @ 05:01) (98/60 - 133/73)  RR: 17 (22 @ 05:01) (16 - 22)  SpO2: 98% (22 @ 05:01) (98% - 100%)    CAPILLARY BLOOD GLUCOSE      POCT Blood Glucose.: 109 mg/dL (29 Dec 2022 21:08)    I&O's Summary      PHYSICAL EXAM:  GENERAL: NAD, well-developed, looks younger than his age  HEAD:  Atraumatic, Normocephalic  EYES: EOMI, PERRLA, conjunctiva and sclera clear  NECK: Supple, No elevated JVD  CHEST/LUNG: Clear to auscultation bilaterally; No wheeze  HEART: Regular rate and rhythm; No murmurs, rubs, or gallops  ABDOMEN: Soft, Nontender, Nondistended; Bowel sounds present  EXTREMITIES:  2+ Peripheral Pulses, No clubbing, cyanosis, or edema  PSYCH: AAOx3  NEUROLOGY: CN II-XII grossly intact, moving all extremities  SKIN: No rashes or lesions    LABS:                        12.3   4.03  )-----------( 203      ( 29 Dec 2022 23:48 )             35.7         122<L>  |  88<L>  |  15  ----------------------------<  116<H>  3.7   |  23  |  1.24    Ca    8.6      29 Dec 2022 23:48    TPro  6.8  /  Alb  3.3  /  TBili  0.9  /  DBili  x   /  AST  27  /  ALT  27  /  AlkPhos  70  12-    PT/INR - ( 29 Dec 2022 23:48 )   PT: 17.7 sec;   INR: 1.52 ratio         PTT - ( 29 Dec 2022 23:48 )  PTT:25.9 sec      Urinalysis Basic - ( 29 Dec 2022 23:50 )    Color: Light Yellow / Appearance: Turbid / S.013 / pH: x  Gluc: x / Ketone: Negative  / Bili: Negative / Urobili: <2 mg/dL   Blood: x / Protein: 100 mg/dL / Nitrite: Positive   Leuk Esterase: Large / RBC: 10 /HPF / WBC >720 /HPF   Sq Epi: x / Non Sq Epi: 1 /HPF / Bacteria: Occasional          RADIOLOGY & ADDITIONAL TESTS:    ECG Personally Reviewed -     Imaging Personally Reviewed:    Consultant(s) Notes Reviewed:      Care Discussed with Consultants/Other Providers:   (30 Dec 2022 08:46)      PAST MEDICAL & SURGICAL HISTORY:  Cholangiocarcinoma      Hypertension          FAMILY HISTORY:      Alochol: Denied  Smoking: Nonsmoker  Drug Use: Denied  Marital Status:         Allergies    No Known Allergies    Intolerances        MEDICATIONS  (STANDING):  enoxaparin Injectable 40 milliGRAM(s) SubCutaneous every 24 hours  piperacillin/tazobactam IVPB.. 3.375 Gram(s) IV Intermittent every 8 hours  sodium chloride 1.5%. 500 milliLiter(s) (50 mL/Hr) IV Continuous <Continuous>  tamsulosin 0.4 milliGRAM(s) Oral two times a day    MEDICATIONS  (PRN):  acetaminophen     Tablet .. 650 milliGRAM(s) Oral every 6 hours PRN Temp greater or equal to 38C (100.4F), Mild Pain (1 - 3)  aluminum hydroxide/magnesium hydroxide/simethicone Suspension 30 milliLiter(s) Oral every 4 hours PRN Dyspepsia  melatonin 3 milliGRAM(s) Oral at bedtime PRN Insomnia  ondansetron Injectable 4 milliGRAM(s) IV Push every 8 hours PRN Nausea and/or Vomiting      ROS  +fatigue, dizziness   No fever, sweats, chills  No epistaxis, HA, sore throat  No CP, SOB, cough, sputum  No n/v/d, abd pain, melena, hematochezia  No edema  No rash  No anxiety  No back pain, joint pain  No bleeding, bruising  +dysuria     T(C): 37.2 (22 @ 16:00), Max: 38.4 (22 @ 22:44)  HR: 89 (22 @ 16:00) (73 - 98)  BP: 132/65 (22 @ 16:00) (98/60 - 133/73)  RR: 18 (22 @ 16:00) (16 - )  SpO2: 100% (22 @ 16:00) (98% - 100%)  Wt(kg): --    PE  NAD  Awake, alert  Anicteric, MMM  RRR  CTAB  Abd soft, NT, ND  +[place] in place, draining clear yellow urine   No c/c/e  No rash grossly  FROM                          12.3   4.03  )-----------( 203      ( 29 Dec 2022 23:48 )             35.7       12    124<L>  |  89<L>  |  14  ----------------------------<  156<H>  3.5   |  22  |  1.20    Ca    8.0<L>      30 Dec 2022 14:21    TPro  6.8  /  Alb  3.3  /  TBili  0.9  /  DBili  x   /  AST  27  /  ALT  27  /  AlkPhos  70

## 2022-12-30 NOTE — H&P ADULT - ASSESSMENT
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 On admission patient found to have sepsis, presumably 2/2 UTI.

## 2022-12-30 NOTE — CONSULT NOTE ADULT - ASSESSMENT
This is an 87 year old male with stage IV cholangiocarcinoma who presents from [facility] with sepsis secondary to UTI and hyponatremia.     1. Stage IV cholangiocarcinoma  -- Metastatic to liver   -- Began treatment with cis/gem/durvalumab, LD 12/20   -- No systemic treatment while admitted  -- Follow up with Dr. Chadd Urbano of [facility] after discharge    2. Sepsis 2/2 UTI  -- UA c/w UTI  -- On empiric Zosyn, follow up cultures     3. Hyponatremia  -- Sodium 122 on admission, 124 this afternoon   -- Nephrology consulted, fluid restrict, workup in progress     Will continue to follow and coordinate care with [facility].    Marybeth Oliver PA-C  Hematology/Oncology  [facility]  [phone] (office)  [phone] (alt office)  Evenings and weekends please call MD on call or office

## 2022-12-30 NOTE — ED PROVIDER NOTE - PHYSICAL EXAMINATION
GENERAL: Awake. Alert. NAD. Well nourished.  HEENT: NC/AT, PERRL, EOMI, Conjunctiva pink, no scleral icterus. Airway patent. Moist mucous membranes.  LUNGS: CTAB. No wheezes or rales noted.  CARDIAC: Chest non-tender to palpation. RRR.  ABDOMEN: No masses noted. Soft, NT, ND, no rebound, no guarding.  BACK: No midline spinal tenderness  EXT: No edema, no calf tenderness, distal pulses 2+ bilaterally  NEURO: A&Ox3. Moving all extremities. Sensation and strength intact throughout.   SKIN: Warm and dry.   PSYCH: Normal affect.

## 2022-12-30 NOTE — ED PROVIDER NOTE - PROGRESS NOTE DETAILS
Lisbet Mathew DO (PGY2): Spoke with Dr. Otero.  Patient to be admitted to his service for hyponatremia and UTI with fever.  Dr. Otero will accept to his service.

## 2022-12-30 NOTE — ED ADULT NURSE REASSESSMENT NOTE - NS ED NURSE REASSESS COMMENT FT1
Patient resting in stretcher. VSS, respirations even and unlabored, no signs/symptoms of acute distress. Medications administered as prescribed.

## 2022-12-30 NOTE — CONSULT NOTE ADULT - ASSESSMENT
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 On admission patient found to have sepsis, presumably 2/2 UTI. Patient also complains of ongoing dizziness with nausea, however he denies nausea or vomiting. He also denies shortness of breath, abdominal pain, dysuria, diarrhea or sick contacts.   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 please call nephrology with result  monitor na closely. avoid overcorrection. na should not correct >8meq in 24 hrs  avoid hypotonic solutions. 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 On admission patient found to have sepsis, presumably 2/2 UTI. Patient also complains of ongoing dizziness with nausea, however he denies nausea or vomiting. He also denies shortness of breath, abdominal pain, dysuria, diarrhea or sick contacts.   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 please call nephrology with result  monitor na closely. avoid overcorrection. na should not correct >8meq in 24 hrs  avoid hypotonic solutions. 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 On admission patient found to have sepsis, presumably 2/2 UTI. Patient also complains of ongoing dizziness with nausea, however he denies nausea or vomiting. He also denies shortness of breath, abdominal pain, dysuria, diarrhea or sick contacts.   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 please call nephrology with result  free water restriction <1L/day pt educated   monitor na closely. avoid overcorrection. na should not correct >8meq in 24 hrs  avoid hypotonic solutions. change all antibiotic to be given with ns if possible  monitor    proteinuria/hematuria  getting treated for UTI  repeat ua post treatement

## 2022-12-30 NOTE — PROVIDER CONTACT NOTE (OTHER) - SITUATION
Bladder scan performed as per request of family; Bladder scan showed >470 cc in bladder. Straight cath performed; after urine drained; gian blood with clots started flowing out

## 2022-12-30 NOTE — ED PROVIDER NOTE - CLINICAL SUMMARY MEDICAL DECISION MAKING FREE TEXT BOX
87M PMH HTN, stage IV cholangiocarcinoma metastatic to liver, s/p laparotomy on 12/1, on chemotherapy and radiation sent in by [facility] for hyponatremia (120) found on labs, associated with fever and UTI diagnosed today at [facility]. Given hx and physical, ddx includes but is not limited to UTI, hyponatremia, flu, covid, neutropenic fever, pneumonia. Plan for labs, UA, CXR, abx, cultures, likely tba.

## 2022-12-30 NOTE — CONSULT NOTE ADULT - NS ATTEND AMEND GEN_ALL_CORE FT
On zosyn for UTI, follow up cultures.  Nephrology following for hyponatremia, likely related to SIADH.
No pain, no shortness of breath    Review of systems: All 10 points ROS was obtained except as above.     acetaminophen     Tablet .. 650 milliGRAM(s) Oral every 6 hours PRN  aluminum hydroxide/magnesium hydroxide/simethicone Suspension 30 milliLiter(s) Oral every 4 hours PRN  enoxaparin Injectable 40 milliGRAM(s) SubCutaneous every 24 hours  melatonin 3 milliGRAM(s) Oral at bedtime PRN  ondansetron Injectable 4 milliGRAM(s) IV Push every 8 hours PRN  piperacillin/tazobactam IVPB.. 3.375 Gram(s) IV Intermittent every 8 hours  tamsulosin 0.4 milliGRAM(s) Oral two times a day      VITAL:  T(C): , Max: 38.4 (22 @ 22:44)  T(F): , Max: 101.2 (22 @ 22:44)  HR: 97 (22 @ 10:30)  BP: 128/64 (22 @ 10:30)  BP(mean): --  RR: 17 (22 @ 10:30)  SpO2: 98% (22 @ 10:30)  Wt(kg): --      PHYSICAL EXAM:  General: NAD; Alert  HEENT:  NCAT; PERRLA  Neck: No JVD; supple  Respiratory: CTA-b/l  Cardiac: RRR s1s2  Gastrointestinal: BS+, soft, NT/ND  Urologic: No white  Extremities: No peripheral edema  Access:     LABS:                          12.3   4.03  )-----------(       ( 29 Dec 2022 23:48 )             35.7     Na(122)/K(3.7)/Cl(88)/HCO3(23)/BUN(15)/Cr(1.24)Glu(116)/Ca(8.6)/Mg(--)/PO4(--)     @ 23:48    Urinalysis Basic - ( 29 Dec 2022 23:50 )    Color: Light Yellow / Appearance: Turbid / S.013 / pH: x  Gluc: x / Ketone: Negative  / Bili: Negative / Urobili: <2 mg/dL   Blood: x / Protein: 100 mg/dL / Nitrite: Positive   Leuk Esterase: Large / RBC: 10 /HPF / WBC >720 /HPF   Sq Epi: x / Non Sq Epi: 1 /HPF / Bacteria: Occasional      Osmolality, Random Urine: 256 mosm/kg (12-30 @ 00:15)        ASSESSMENT/PLAN      Waiting full urine studies. And repeat BMP now.

## 2022-12-31 LAB
A1C WITH ESTIMATED AVERAGE GLUCOSE RESULT: 4.8 % — SIGNIFICANT CHANGE UP (ref 4–5.6)
ALBUMIN SERPL ELPH-MCNC: 2.7 G/DL — LOW (ref 3.3–5)
ALP SERPL-CCNC: 60 U/L — SIGNIFICANT CHANGE UP (ref 40–120)
ALT FLD-CCNC: 17 U/L — SIGNIFICANT CHANGE UP (ref 4–41)
ANION GAP SERPL CALC-SCNC: 10 MMOL/L — SIGNIFICANT CHANGE UP (ref 7–14)
AST SERPL-CCNC: 15 U/L — SIGNIFICANT CHANGE UP (ref 4–40)
BILIRUB SERPL-MCNC: 0.5 MG/DL — SIGNIFICANT CHANGE UP (ref 0.2–1.2)
BUN SERPL-MCNC: 11 MG/DL — SIGNIFICANT CHANGE UP (ref 7–23)
CALCIUM SERPL-MCNC: 8 MG/DL — LOW (ref 8.4–10.5)
CHLORIDE SERPL-SCNC: 94 MMOL/L — LOW (ref 98–107)
CHOLEST SERPL-MCNC: 125 MG/DL — SIGNIFICANT CHANGE UP
CO2 SERPL-SCNC: 22 MMOL/L — SIGNIFICANT CHANGE UP (ref 22–31)
CORTIS AM PEAK SERPL-MCNC: 20.3 UG/DL — HIGH (ref 6–18.4)
CREAT SERPL-MCNC: 1.14 MG/DL — SIGNIFICANT CHANGE UP (ref 0.5–1.3)
CULTURE RESULTS: SIGNIFICANT CHANGE UP
EGFR: 63 ML/MIN/1.73M2 — SIGNIFICANT CHANGE UP
ESTIMATED AVERAGE GLUCOSE: 91 — SIGNIFICANT CHANGE UP
GLUCOSE SERPL-MCNC: 106 MG/DL — HIGH (ref 70–99)
HCT VFR BLD CALC: 33.4 % — LOW (ref 39–50)
HDLC SERPL-MCNC: 24 MG/DL — LOW
HGB BLD-MCNC: 11.4 G/DL — LOW (ref 13–17)
LIPID PNL WITH DIRECT LDL SERPL: 82 MG/DL — SIGNIFICANT CHANGE UP
MCHC RBC-ENTMCNC: 29.9 PG — SIGNIFICANT CHANGE UP (ref 27–34)
MCHC RBC-ENTMCNC: 34.1 GM/DL — SIGNIFICANT CHANGE UP (ref 32–36)
MCV RBC AUTO: 87.7 FL — SIGNIFICANT CHANGE UP (ref 80–100)
NON HDL CHOLESTEROL: 101 MG/DL — SIGNIFICANT CHANGE UP
NRBC # BLD: 0 /100 WBCS — SIGNIFICANT CHANGE UP (ref 0–0)
NRBC # FLD: 0 K/UL — SIGNIFICANT CHANGE UP (ref 0–0)
PLATELET # BLD AUTO: 342 K/UL — SIGNIFICANT CHANGE UP (ref 150–400)
POTASSIUM SERPL-MCNC: 3 MMOL/L — LOW (ref 3.5–5.3)
POTASSIUM SERPL-SCNC: 3 MMOL/L — LOW (ref 3.5–5.3)
PROT SERPL-MCNC: 5.9 G/DL — LOW (ref 6–8.3)
RBC # BLD: 3.81 M/UL — LOW (ref 4.2–5.8)
RBC # FLD: 12.2 % — SIGNIFICANT CHANGE UP (ref 10.3–14.5)
SODIUM SERPL-SCNC: 126 MMOL/L — LOW (ref 135–145)
SPECIMEN SOURCE: SIGNIFICANT CHANGE UP
TRIGL SERPL-MCNC: 93 MG/DL — SIGNIFICANT CHANGE UP
TSH SERPL-MCNC: 0.9 UIU/ML — SIGNIFICANT CHANGE UP (ref 0.27–4.2)
WBC # BLD: 6.67 K/UL — SIGNIFICANT CHANGE UP (ref 3.8–10.5)
WBC # FLD AUTO: 6.67 K/UL — SIGNIFICANT CHANGE UP (ref 3.8–10.5)

## 2022-12-31 RX ORDER — POTASSIUM CHLORIDE 20 MEQ
40 PACKET (EA) ORAL EVERY 4 HOURS
Refills: 0 | Status: COMPLETED | OUTPATIENT
Start: 2022-12-31 | End: 2022-12-31

## 2022-12-31 RX ORDER — SODIUM CHLORIDE 5 G/100ML
500 INJECTION, SOLUTION INTRAVENOUS
Refills: 0 | Status: COMPLETED | OUTPATIENT
Start: 2022-12-31 | End: 2022-12-31

## 2022-12-31 RX ORDER — POTASSIUM CHLORIDE 20 MEQ
40 PACKET (EA) ORAL EVERY 4 HOURS
Refills: 0 | Status: DISCONTINUED | OUTPATIENT
Start: 2022-12-31 | End: 2022-12-31

## 2022-12-31 RX ADMIN — Medication 40 MILLIEQUIVALENT(S): at 14:53

## 2022-12-31 RX ADMIN — TAMSULOSIN HYDROCHLORIDE 0.4 MILLIGRAM(S): 0.4 CAPSULE ORAL at 06:15

## 2022-12-31 RX ADMIN — SODIUM CHLORIDE 50 MILLILITER(S): 5 INJECTION, SOLUTION INTRAVENOUS at 16:18

## 2022-12-31 RX ADMIN — Medication 40 MILLIEQUIVALENT(S): at 17:42

## 2022-12-31 RX ADMIN — PIPERACILLIN AND TAZOBACTAM 25 GRAM(S): 4; .5 INJECTION, POWDER, LYOPHILIZED, FOR SOLUTION INTRAVENOUS at 09:19

## 2022-12-31 RX ADMIN — ENOXAPARIN SODIUM 40 MILLIGRAM(S): 100 INJECTION SUBCUTANEOUS at 07:21

## 2022-12-31 RX ADMIN — Medication 650 MILLIGRAM(S): at 17:41

## 2022-12-31 RX ADMIN — PIPERACILLIN AND TAZOBACTAM 25 GRAM(S): 4; .5 INJECTION, POWDER, LYOPHILIZED, FOR SOLUTION INTRAVENOUS at 01:50

## 2022-12-31 RX ADMIN — PIPERACILLIN AND TAZOBACTAM 25 GRAM(S): 4; .5 INJECTION, POWDER, LYOPHILIZED, FOR SOLUTION INTRAVENOUS at 17:42

## 2022-12-31 RX ADMIN — TAMSULOSIN HYDROCHLORIDE 0.4 MILLIGRAM(S): 0.4 CAPSULE ORAL at 17:42

## 2022-12-31 NOTE — PROVIDER CONTACT NOTE (OTHER) - BACKGROUND
Pt admitted for UTI, hyponatremia bp 98/56 manual.
UTI/Hyponatremia; pt has a hx of urinary retention
uti/hyponatremia hx of urinary retention

## 2022-12-31 NOTE — PROVIDER CONTACT NOTE (OTHER) - ASSESSMENT
dark blood urine in white bag; small clots noted
Pt woken to take vitals, pt began to kick and refuse vitals. Acp made aware.
Pt remained hemodynamically stable   132/65 BP  89 HR   100 O2

## 2022-12-31 NOTE — PROVIDER CONTACT NOTE (OTHER) - ACTION/TREATMENT ORDERED:
Provider to bedside; provider visualized bloody urine output; Krueger inserted for close monitoring.
am vitals will be drawn.
CBC ordered

## 2023-01-01 LAB
ANION GAP SERPL CALC-SCNC: 11 MMOL/L — SIGNIFICANT CHANGE UP (ref 7–14)
ANION GAP SERPL CALC-SCNC: 11 MMOL/L — SIGNIFICANT CHANGE UP (ref 7–14)
BUN SERPL-MCNC: 10 MG/DL — SIGNIFICANT CHANGE UP (ref 7–23)
BUN SERPL-MCNC: 12 MG/DL — SIGNIFICANT CHANGE UP (ref 7–23)
CALCIUM SERPL-MCNC: 7.7 MG/DL — LOW (ref 8.4–10.5)
CALCIUM SERPL-MCNC: 8 MG/DL — LOW (ref 8.4–10.5)
CHLORIDE SERPL-SCNC: 98 MMOL/L — SIGNIFICANT CHANGE UP (ref 98–107)
CHLORIDE SERPL-SCNC: 99 MMOL/L — SIGNIFICANT CHANGE UP (ref 98–107)
CHLORIDE UR-SCNC: 86 MMOL/L — SIGNIFICANT CHANGE UP
CO2 SERPL-SCNC: 21 MMOL/L — LOW (ref 22–31)
CO2 SERPL-SCNC: 24 MMOL/L — SIGNIFICANT CHANGE UP (ref 22–31)
CORTIS AM PEAK SERPL-MCNC: 14.8 UG/DL — SIGNIFICANT CHANGE UP (ref 6–18.4)
CREAT SERPL-MCNC: 1.04 MG/DL — SIGNIFICANT CHANGE UP (ref 0.5–1.3)
CREAT SERPL-MCNC: 1.04 MG/DL — SIGNIFICANT CHANGE UP (ref 0.5–1.3)
EGFR: 70 ML/MIN/1.73M2 — SIGNIFICANT CHANGE UP
EGFR: 70 ML/MIN/1.73M2 — SIGNIFICANT CHANGE UP
GLUCOSE SERPL-MCNC: 107 MG/DL — HIGH (ref 70–99)
GLUCOSE SERPL-MCNC: 142 MG/DL — HIGH (ref 70–99)
HCT VFR BLD CALC: 31.6 % — LOW (ref 39–50)
HGB BLD-MCNC: 10.5 G/DL — LOW (ref 13–17)
MAGNESIUM SERPL-MCNC: 1.8 MG/DL — SIGNIFICANT CHANGE UP (ref 1.6–2.6)
MAGNESIUM SERPL-MCNC: 1.9 MG/DL — SIGNIFICANT CHANGE UP (ref 1.6–2.6)
MCHC RBC-ENTMCNC: 29.7 PG — SIGNIFICANT CHANGE UP (ref 27–34)
MCHC RBC-ENTMCNC: 33.2 GM/DL — SIGNIFICANT CHANGE UP (ref 32–36)
MCV RBC AUTO: 89.5 FL — SIGNIFICANT CHANGE UP (ref 80–100)
MRSA PCR RESULT.: SIGNIFICANT CHANGE UP
NRBC # BLD: 0 /100 WBCS — SIGNIFICANT CHANGE UP (ref 0–0)
NRBC # FLD: 0 K/UL — SIGNIFICANT CHANGE UP (ref 0–0)
OSMOLALITY SERPL: 271 MOSMOL/KG — LOW (ref 280–301)
OSMOLALITY UR: 325 MOSM/KG — SIGNIFICANT CHANGE UP (ref 50–1200)
PHOSPHATE SERPL-MCNC: 2 MG/DL — LOW (ref 2.5–4.5)
PHOSPHATE SERPL-MCNC: 2.3 MG/DL — LOW (ref 2.5–4.5)
PLATELET # BLD AUTO: 458 K/UL — HIGH (ref 150–400)
POTASSIUM SERPL-MCNC: 3.7 MMOL/L — SIGNIFICANT CHANGE UP (ref 3.5–5.3)
POTASSIUM SERPL-MCNC: 3.7 MMOL/L — SIGNIFICANT CHANGE UP (ref 3.5–5.3)
POTASSIUM SERPL-SCNC: 3.7 MMOL/L — SIGNIFICANT CHANGE UP (ref 3.5–5.3)
POTASSIUM SERPL-SCNC: 3.7 MMOL/L — SIGNIFICANT CHANGE UP (ref 3.5–5.3)
RBC # BLD: 3.53 M/UL — LOW (ref 4.2–5.8)
RBC # FLD: 12.3 % — SIGNIFICANT CHANGE UP (ref 10.3–14.5)
S AUREUS DNA NOSE QL NAA+PROBE: DETECTED
SODIUM SERPL-SCNC: 131 MMOL/L — LOW (ref 135–145)
SODIUM SERPL-SCNC: 133 MMOL/L — LOW (ref 135–145)
SODIUM UR-SCNC: 81 MMOL/L — SIGNIFICANT CHANGE UP
TSH SERPL-MCNC: 0.93 UIU/ML — SIGNIFICANT CHANGE UP (ref 0.27–4.2)
WBC # BLD: 6.65 K/UL — SIGNIFICANT CHANGE UP (ref 3.8–10.5)
WBC # FLD AUTO: 6.65 K/UL — SIGNIFICANT CHANGE UP (ref 3.8–10.5)

## 2023-01-01 RX ORDER — PIPERACILLIN AND TAZOBACTAM 4; .5 G/20ML; G/20ML
3.38 INJECTION, POWDER, LYOPHILIZED, FOR SOLUTION INTRAVENOUS EVERY 8 HOURS
Refills: 0 | Status: DISCONTINUED | OUTPATIENT
Start: 2023-01-02 | End: 2023-01-02

## 2023-01-01 RX ORDER — POTASSIUM PHOSPHATE, MONOBASIC POTASSIUM PHOSPHATE, DIBASIC 236; 224 MG/ML; MG/ML
15 INJECTION, SOLUTION INTRAVENOUS ONCE
Refills: 0 | Status: COMPLETED | OUTPATIENT
Start: 2023-01-01 | End: 2023-01-01

## 2023-01-01 RX ORDER — MUPIROCIN 20 MG/G
1 OINTMENT TOPICAL
Refills: 0 | Status: DISCONTINUED | OUTPATIENT
Start: 2023-01-01 | End: 2023-01-02

## 2023-01-01 RX ADMIN — MUPIROCIN 1 APPLICATION(S): 20 OINTMENT TOPICAL at 18:30

## 2023-01-01 RX ADMIN — TAMSULOSIN HYDROCHLORIDE 0.4 MILLIGRAM(S): 0.4 CAPSULE ORAL at 18:29

## 2023-01-01 RX ADMIN — PIPERACILLIN AND TAZOBACTAM 25 GRAM(S): 4; .5 INJECTION, POWDER, LYOPHILIZED, FOR SOLUTION INTRAVENOUS at 18:30

## 2023-01-01 RX ADMIN — PIPERACILLIN AND TAZOBACTAM 25 GRAM(S): 4; .5 INJECTION, POWDER, LYOPHILIZED, FOR SOLUTION INTRAVENOUS at 10:19

## 2023-01-01 RX ADMIN — ENOXAPARIN SODIUM 40 MILLIGRAM(S): 100 INJECTION SUBCUTANEOUS at 06:39

## 2023-01-01 RX ADMIN — PIPERACILLIN AND TAZOBACTAM 25 GRAM(S): 4; .5 INJECTION, POWDER, LYOPHILIZED, FOR SOLUTION INTRAVENOUS at 02:37

## 2023-01-01 RX ADMIN — TAMSULOSIN HYDROCHLORIDE 0.4 MILLIGRAM(S): 0.4 CAPSULE ORAL at 06:38

## 2023-01-01 RX ADMIN — POTASSIUM PHOSPHATE, MONOBASIC POTASSIUM PHOSPHATE, DIBASIC 62.5 MILLIMOLE(S): 236; 224 INJECTION, SOLUTION INTRAVENOUS at 20:24

## 2023-01-01 NOTE — CHART NOTE - NSCHARTNOTEFT_GEN_A_CORE
Urology asked to speak to family regarding hematuria, previously seen blood clots, and need for catheter. Patient and family would like to avoid catheter.    Krueger was removed today at noon. Patient passed TOV with void of 150 mL and PVR of 200 mL. He does not feel uncomfortable. Urine was clear yellow without clots. Unclear what patient's baseline PVR is, but he no longer needs catheter unless he is unable to void or experiences suprapubic discomfort.    Discussed plan with patient and family. At family's request, urology asked to discuss hematuria, previously seen blood clots, and need for catheter. Patient and family would like to avoid catheter.    Krueger was removed today at noon. Patient passed TOV with void of 150 mL and PVR of 200 mL. He does not feel uncomfortable. Urine was clear yellow without clots. Unclear what patient's baseline PVR is, but he no longer needs catheter unless he is unable to void or experiences suprapubic discomfort.    Discussed plan with patient and family. Patient should follow up with his outpatient urologist regarding his hematuria, although this was likely due to trauma from straight catheterization attempts.

## 2023-01-01 NOTE — PROGRESS NOTE ADULT - PROBLEM SELECTOR PLAN 3
Oncology consulted, follow up if further recs  continue outpatient follow up
Oncology consulted, follow up if further recs  continue outpatient follow up

## 2023-01-01 NOTE — PROGRESS NOTE ADULT - PROBLEM SELECTOR PLAN 2
Possibly SIADH, elevated urine osm, however no urine Na yet  Urine Na ordered, follow up results and monitor Na  renal eval appreciated
Possibly SIADH, elevated urine osm, however no urine Na yet  Urine Na ordered, follow up results and monitor Na  renal eval appreciated  - improved Na level

## 2023-01-01 NOTE — PROGRESS NOTE ADULT - PROBLEM SELECTOR PLAN 1
Patient with sepsis on admission presumably 2/2 UTI  Cxs obtained, follow up results and treat accordingly   Continue antibiotics for now
Patient with sepsis on admission presumably 2/2 UTI  Cxs obtained, follow up results and treat accordingly   Continue antibiotics for now  Urine Cx multi organism, canotam.  white removed, monitor renal funciton and output

## 2023-01-01 NOTE — PROGRESS NOTE ADULT - PROBLEM SELECTOR PLAN 4
Monitor for now, BP soft, hold any antihypertensive
Monitor for now, BP soft, hold any antihypertensive

## 2023-01-02 ENCOUNTER — TRANSCRIPTION ENCOUNTER (OUTPATIENT)
Age: 88
End: 2023-01-02

## 2023-01-02 VITALS
DIASTOLIC BLOOD PRESSURE: 70 MMHG | TEMPERATURE: 98 F | SYSTOLIC BLOOD PRESSURE: 128 MMHG | HEART RATE: 100 BPM | RESPIRATION RATE: 16 BRPM | OXYGEN SATURATION: 100 %

## 2023-01-02 LAB
ALBUMIN SERPL ELPH-MCNC: 3 G/DL — LOW (ref 3.3–5)
ALP SERPL-CCNC: 70 U/L — SIGNIFICANT CHANGE UP (ref 40–120)
ALT FLD-CCNC: 25 U/L — SIGNIFICANT CHANGE UP (ref 4–41)
ANION GAP SERPL CALC-SCNC: 10 MMOL/L — SIGNIFICANT CHANGE UP (ref 7–14)
AST SERPL-CCNC: 22 U/L — SIGNIFICANT CHANGE UP (ref 4–40)
B PERT DNA SPEC QL NAA+PROBE: SIGNIFICANT CHANGE UP
B PERT+PARAPERT DNA PNL SPEC NAA+PROBE: SIGNIFICANT CHANGE UP
BILIRUB SERPL-MCNC: 0.5 MG/DL — SIGNIFICANT CHANGE UP (ref 0.2–1.2)
BORDETELLA PARAPERTUSSIS (RAPRVP): SIGNIFICANT CHANGE UP
BUN SERPL-MCNC: 10 MG/DL — SIGNIFICANT CHANGE UP (ref 7–23)
C PNEUM DNA SPEC QL NAA+PROBE: SIGNIFICANT CHANGE UP
CALCIUM SERPL-MCNC: 8.6 MG/DL — SIGNIFICANT CHANGE UP (ref 8.4–10.5)
CHLORIDE SERPL-SCNC: 97 MMOL/L — LOW (ref 98–107)
CO2 SERPL-SCNC: 25 MMOL/L — SIGNIFICANT CHANGE UP (ref 22–31)
CORTIS AM PEAK SERPL-MCNC: 25.2 UG/DL — HIGH (ref 6–18.4)
CREAT SERPL-MCNC: 1.08 MG/DL — SIGNIFICANT CHANGE UP (ref 0.5–1.3)
EGFR: 67 ML/MIN/1.73M2 — SIGNIFICANT CHANGE UP
FLUAV SUBTYP SPEC NAA+PROBE: SIGNIFICANT CHANGE UP
FLUBV RNA SPEC QL NAA+PROBE: SIGNIFICANT CHANGE UP
GLUCOSE BLDC GLUCOMTR-MCNC: 117 MG/DL — HIGH (ref 70–99)
GLUCOSE SERPL-MCNC: 110 MG/DL — HIGH (ref 70–99)
HADV DNA SPEC QL NAA+PROBE: SIGNIFICANT CHANGE UP
HCOV 229E RNA SPEC QL NAA+PROBE: SIGNIFICANT CHANGE UP
HCOV HKU1 RNA SPEC QL NAA+PROBE: SIGNIFICANT CHANGE UP
HCOV NL63 RNA SPEC QL NAA+PROBE: SIGNIFICANT CHANGE UP
HCOV OC43 RNA SPEC QL NAA+PROBE: SIGNIFICANT CHANGE UP
HCT VFR BLD CALC: 35.5 % — LOW (ref 39–50)
HGB BLD-MCNC: 11.9 G/DL — LOW (ref 13–17)
HMPV RNA SPEC QL NAA+PROBE: SIGNIFICANT CHANGE UP
HPIV1 RNA SPEC QL NAA+PROBE: SIGNIFICANT CHANGE UP
HPIV2 RNA SPEC QL NAA+PROBE: SIGNIFICANT CHANGE UP
HPIV3 RNA SPEC QL NAA+PROBE: SIGNIFICANT CHANGE UP
HPIV4 RNA SPEC QL NAA+PROBE: SIGNIFICANT CHANGE UP
M PNEUMO DNA SPEC QL NAA+PROBE: SIGNIFICANT CHANGE UP
MAGNESIUM SERPL-MCNC: 1.8 MG/DL — SIGNIFICANT CHANGE UP (ref 1.6–2.6)
MCHC RBC-ENTMCNC: 29.8 PG — SIGNIFICANT CHANGE UP (ref 27–34)
MCHC RBC-ENTMCNC: 33.5 GM/DL — SIGNIFICANT CHANGE UP (ref 32–36)
MCV RBC AUTO: 89 FL — SIGNIFICANT CHANGE UP (ref 80–100)
NRBC # BLD: 0 /100 WBCS — SIGNIFICANT CHANGE UP (ref 0–0)
NRBC # FLD: 0 K/UL — SIGNIFICANT CHANGE UP (ref 0–0)
PHOSPHATE SERPL-MCNC: 3.2 MG/DL — SIGNIFICANT CHANGE UP (ref 2.5–4.5)
PLATELET # BLD AUTO: 608 K/UL — HIGH (ref 150–400)
POTASSIUM SERPL-MCNC: 4 MMOL/L — SIGNIFICANT CHANGE UP (ref 3.5–5.3)
POTASSIUM SERPL-SCNC: 4 MMOL/L — SIGNIFICANT CHANGE UP (ref 3.5–5.3)
PROT SERPL-MCNC: 6.5 G/DL — SIGNIFICANT CHANGE UP (ref 6–8.3)
RAPID RVP RESULT: SIGNIFICANT CHANGE UP
RBC # BLD: 3.99 M/UL — LOW (ref 4.2–5.8)
RBC # FLD: 12.4 % — SIGNIFICANT CHANGE UP (ref 10.3–14.5)
RSV RNA SPEC QL NAA+PROBE: SIGNIFICANT CHANGE UP
RV+EV RNA SPEC QL NAA+PROBE: SIGNIFICANT CHANGE UP
SARS-COV-2 RNA SPEC QL NAA+PROBE: SIGNIFICANT CHANGE UP
SODIUM SERPL-SCNC: 132 MMOL/L — LOW (ref 135–145)
WBC # BLD: 6 K/UL — SIGNIFICANT CHANGE UP (ref 3.8–10.5)
WBC # FLD AUTO: 6 K/UL — SIGNIFICANT CHANGE UP (ref 3.8–10.5)

## 2023-01-02 PROCEDURE — 76770 US EXAM ABDO BACK WALL COMP: CPT | Mod: 26

## 2023-01-02 PROCEDURE — 99223 1ST HOSP IP/OBS HIGH 75: CPT

## 2023-01-02 RX ORDER — TAMSULOSIN HYDROCHLORIDE 0.4 MG/1
1 CAPSULE ORAL
Qty: 60 | Refills: 0
Start: 2023-01-02 | End: 2023-01-31

## 2023-01-02 RX ORDER — CEFPODOXIME PROXETIL 100 MG
1 TABLET ORAL
Qty: 14 | Refills: 0
Start: 2023-01-02 | End: 2023-01-08

## 2023-01-02 RX ORDER — LANOLIN ALCOHOL/MO/W.PET/CERES
1 CREAM (GRAM) TOPICAL
Qty: 0 | Refills: 0 | DISCHARGE
Start: 2023-01-02

## 2023-01-02 RX ADMIN — PIPERACILLIN AND TAZOBACTAM 25 GRAM(S): 4; .5 INJECTION, POWDER, LYOPHILIZED, FOR SOLUTION INTRAVENOUS at 02:14

## 2023-01-02 RX ADMIN — ENOXAPARIN SODIUM 40 MILLIGRAM(S): 100 INJECTION SUBCUTANEOUS at 06:27

## 2023-01-02 RX ADMIN — MUPIROCIN 1 APPLICATION(S): 20 OINTMENT TOPICAL at 06:27

## 2023-01-02 RX ADMIN — PIPERACILLIN AND TAZOBACTAM 25 GRAM(S): 4; .5 INJECTION, POWDER, LYOPHILIZED, FOR SOLUTION INTRAVENOUS at 10:55

## 2023-01-02 RX ADMIN — TAMSULOSIN HYDROCHLORIDE 0.4 MILLIGRAM(S): 0.4 CAPSULE ORAL at 06:25

## 2023-01-02 NOTE — DISCHARGE NOTE NURSING/CASE MANAGEMENT/SOCIAL WORK - NSDCFUADDAPPT_GEN_ALL_CORE_FT
Case Management with follow-up with [facility]. Please feel free to contact the unit you were discharged from directly at [phone] if you have any questions or concerns in regards to your home care services.

## 2023-01-02 NOTE — CONSULT NOTE ADULT - ASSESSMENT
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 On admission patient found to have sepsis, presumably 2/2 UTI. Patient also complains of ongoing dizziness with nausea, however he denies nausea or vomiting. Endocrinology consulted regarding hyponatremia and concern for AI.      #Hyponatremia  - No previous history of steroid use.   - No history of adrenal pathology in the past   - AM cortisol x 3 noted to be normal during this admission (20.3, 14.8, 25.3)  - No evidence of hypothyroidism (TSH 0.93)  - BP remains stable, no evidence of hypoglycemia.   - Na improving now with fluid restriction.   Plan:  - Given normal AM cortisol on multiple occasions, adrenal insufficiency has been ruled out as an etiology for hyponatremia. Would not pursue further workup regarding AI as a cause of patient's hyponatremia.   - Hypothyroidism ruled out as a cause of patient's hyponatremia.  - Would defer to nephrology for further workup for hyponatremia.     #HTN  - Goal BP <130/80   - Management per primary team     #Sepsis  - Antibiotic management per primary team     Rosalinda Mercado,    Attending Physician   Department of Endocrinology, Diabetes and Metabolism     If before 9AM or after 5PM, or on weekends/holidays, please call the Endocrine answering service for assistance ([phone]).  For nonurgent matters, please email [email or web address] for assistance.      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 On admission patient found to have sepsis, presumably 2/2 UTI. Patient also complains of ongoing dizziness with nausea, however he denies nausea or vomiting. Endocrinology consulted regarding hyponatremia and concern for AI.      #Hyponatremia  - No previous history of steroid use.   - No history of adrenal pathology in the past   - AM cortisol x 3 noted to be normal during this admission (20.3, 14.8, 25.3)  - No evidence of hypothyroidism (TSH 0.93)  - BP remains stable, no evidence of hypoglycemia.   - Na improving now with fluid restriction.   Plan:  - Given normal AM cortisol on multiple occasions, adrenal insufficiency has been ruled out as an etiology for hyponatremia. Would not pursue further workup regarding AI as a cause of patient's hyponatremia.   - Hypothyroidism ruled out as a cause of patient's hyponatremia.  - Would defer to nephrology for further workup for hyponatremia.     #HTN  - Goal BP <130/80   - Management per primary team     #Sepsis  - Antibiotic management per primary team     We will sign off at this time- please feel free to contact us if there are any questions or concerns.     Rosalinda Mercado, DO   Attending Physician   Department of Endocrinology, Diabetes and Metabolism     If before 9AM or after 5PM, or on weekends/holidays, please call the Endocrine answering service for assistance ([phone]).  For nonurgent matters, please email [email or web address] for assistance.

## 2023-01-02 NOTE — DISCHARGE NOTE PROVIDER - NSDCCPCAREPLAN_GEN_ALL_CORE_FT
PRINCIPAL DISCHARGE DIAGNOSIS  Diagnosis: Urinary tract infection  Assessment and Plan of Treatment: You came to the hospital with a urine infection. You were treated with iv antibiotics. Please continue your oral antibiotics. Urology saw you and placed a hwite catheter, however your retention improved. Per urology, you no longer need a catheter unless you are unable to void or experience suprapubic discomfort. Urology stated that blood clots in your urine were likely related to trauma secondary to straight cathing. Please see your urologist outpatient for further care and follow up with your pcp in 1-2 weeks.      SECONDARY DISCHARGE DIAGNOSES  Diagnosis: Cholangiocarcinoma  Assessment and Plan of Treatment: Please follow up with your oncologist for further care    Diagnosis: Hyponatremia  Assessment and Plan of Treatment: Nephrology saw you for a low sodium blood level. This was likely consistent with SIADH. Your sodium improved with fluid restriction. Your cortisol was found to be elevated, so nephrology requested an endocrinology consult. Per endocrinology, please follow up with your pcp for further monitoring or with endocrine if desired       PRINCIPAL DISCHARGE DIAGNOSIS  Diagnosis: Urinary tract infection  Assessment and Plan of Treatment: You came to the hospital with a urine infection. You were treated with iv antibiotics. Please continue your oral antibiotics. Urology saw you and placed a white catheter, however your retention improved. Per urology, you no longer need a catheter unless you are unable to void or experience suprapubic discomfort. Urology stated that blood clots in your urine were likely related to trauma secondary to straight cathing. Please see your urologist outpatient for further care and follow up with your pcp in 1-2 weeks.  - kidney bladder ultrasound showed: Difficult to delineate bilateral hydronephrosis versus parapelvic cysts. Irregular asymmetric thickening of the bladder wall with suggestion of the dilated distal ureter.  - CT urogram is recommended for further evaluation      SECONDARY DISCHARGE DIAGNOSES  Diagnosis: Cholangiocarcinoma  Assessment and Plan of Treatment: Please follow up with your oncologist for further care    Diagnosis: Hyponatremia  Assessment and Plan of Treatment: Nephrology saw you for a low sodium blood level. This was likely consistent with SIADH. Your sodium improved with fluid restriction. Your cortisol was found to be elevated, so nephrology requested an endocrinology consult. Per endocrinology, please follow up with your pcp for further monitoring or with endocrine if desired

## 2023-01-02 NOTE — DISCHARGE NOTE NURSING/CASE MANAGEMENT/SOCIAL WORK - NSDCPEFALRISK_GEN_ALL_CORE
For information on Fall & Injury Prevention, visit: [email or web address] OR  [email or web address] OR  https://www.cdc.gov/steadi/patient.html

## 2023-01-02 NOTE — PROGRESS NOTE ADULT - REASON FOR ADMISSION
Lab abnormality

## 2023-01-02 NOTE — DISCHARGE NOTE PROVIDER - HOSPITAL COURSE
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 On admission patient found to have sepsis, presumably 2/2 UTI.      Problem/Plan - 1:  ·  Problem: Sepsis secondary to UTI.   ·  Plan: Patient with sepsis on admission presumably 2/2 UTI  Cxs obtained, follow up results and treat accordingly   - On IV Zosyn, switched to PO cefpodoxime 200 mg BID x 7 days upon discharge  Urine Cx multi organism, contaminated  - Krueger placed by urology on 12/30, now removed 1/1, passed TOV  - Straight cath to be avoided per family as when placed, was traumatic with copious gian red blood with residual blood tinged urine  - kidney bladder ultrasound ordered, may obtain outpatient if outpatient urologist deems necessary  - Urology outpatient f/u with outpt urologist through [facility]   - no longer needs catheter unless he is unable to void or experiences suprapubic discomfort, urology recommends outpatient f/u  - pcp f/u in 1-2 weeks     Problem/Plan - 2:  ·  Problem: Hyponatremia.   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  - Cortisol elevated, nephrology requesting endocrinology consult to comment if any further w/u required  - Endocrine consulted, per endocrine may follow up outpatient for further evaluation     Problem/Plan - 3:  ·  Problem: Cholangiocarcinoma.   ·  Plan: Oncology consulted, follow up if further recs  continue outpatient follow up.     Problem/Plan - 4:  ·  Problem: Hypertension.   ·  Plan: Monitor for now, BP soft, hold any antihypertensive.     Problem/Plan - 5:  ·  Problem: Prophylactic measure.   ·  Plan: DVT and gI PPX.    Dispo: Home, no PT needs    Patient seen and evaluated, no acute somatic complaints. Reviewed discharge medications with patient; All new medications requiring new prescriptions were sent to the pharmacy of patient's choice. Reviewed need for prescription for previous home medications and new prescriptions sent if requested. Medically cleared/stable for discharge as per Dr. Otero on 1/2/2023 with close outpatient follow up within 1-2 weeks of discharge. Patient understands and agrees with plan of care.      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 On admission patient found to have sepsis, presumably 2/2 UTI.      Problem/Plan - 1:  ·  Problem: Sepsis secondary to UTI.   ·  Plan: Patient with sepsis on admission presumably 2/2 UTI  Cxs obtained, follow up results and treat accordingly   - On IV Zosyn, switched to PO cefpodoxime 200 mg BID x 7 days upon discharge  Urine Cx multi organism, contaminated  - Krueger placed by urology on 12/30, now removed 1/1, passed TOV  - Straight cath to be avoided per family as when placed, was traumatic with copious gian red blood with residual blood tinged urine  - kidney bladder ultrasound showed _________________________***************  - Urology outpatient f/u with outpt urologist through [facility]   - no longer needs catheter unless he is unable to void or experiences suprapubic discomfort, urology recommends outpatient f/u  - pcp f/u in 1-2 weeks     Problem/Plan - 2:  ·  Problem: Hyponatremia.   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  - Cortisol elevated, nephrology requesting endocrinology consult to comment if any further w/u required  - Endocrine consulted, per endocrine no suspicion for adrenal insufficiency as no hypothyroidism and cortisol is elevated     Problem/Plan - 3:  ·  Problem: Cholangiocarcinoma.   ·  Plan: Oncology consulted, follow up if further recs  continue outpatient follow up.     Problem/Plan - 4:  ·  Problem: Hypertension.   ·  Plan: Monitor for now, BP soft, hold any antihypertensive.     Problem/Plan - 5:  ·  Problem: Prophylactic measure.   ·  Plan: DVT and gI PPX.    Dispo: Home  [ ] PT ***************    Patient seen and evaluated, no acute somatic complaints. Reviewed discharge medications with patient; All new medications requiring new prescriptions were sent to the pharmacy of patient's choice. Reviewed need for prescription for previous home medications and new prescriptions sent if requested. Medically cleared/stable for discharge as per Dr. Otero on 1/2/2023 with close outpatient follow up within 1-2 weeks of discharge. Patient understands and agrees with plan of care.      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 On admission patient found to have sepsis, presumably 2/2 UTI.      Problem/Plan - 1:  ·  Problem: Sepsis secondary to UTI.   ·  Plan: Patient with sepsis on admission presumably 2/2 UTI  Cxs obtained, follow up results and treat accordingly   - On IV Zosyn, switched to PO cefpodoxime 200 mg BID x 7 days upon discharge  Urine Cx multi organism, contaminated  - Krueger placed by urology on 12/30, now removed 1/1, passed TOV  - Straight cath to be avoided per family as when placed, was traumatic with copious gian red blood with residual blood tinged urine  - kidney bladder ultrasound showed _________________________***************  - Urology outpatient f/u with outpt urologist through [facility]   - no longer needs catheter unless he is unable to void or experiences suprapubic discomfort, urology recommends outpatient f/u  - pcp f/u in 1-2 weeks     Problem/Plan - 2:  ·  Problem: Hyponatremia.   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  - Cortisol elevated, nephrology requesting endocrinology consult to comment if any further w/u required  - Endocrine consulted, per endocrine no suspicion for adrenal insufficiency as no hypothyroidism and cortisol is elevated     Problem/Plan - 3:  ·  Problem: Cholangiocarcinoma.   ·  Plan: Oncology consulted, follow up if further recs  continue outpatient follow up.     Problem/Plan - 4:  ·  Problem: Hypertension.   ·  Plan: Monitor for now, BP soft, hold any antihypertensive.     Problem/Plan - 5:  ·  Problem: Prophylactic measure.   ·  Plan: DVT and gI PPX.    Dispo: Home with Home care, no PT needs    Patient seen and evaluated, no acute somatic complaints. Reviewed discharge medications with patient; All new medications requiring new prescriptions were sent to the pharmacy of patient's choice. Reviewed need for prescription for previous home medications and new prescriptions sent if requested. Medically cleared/stable for discharge as per Dr. Otero on 1/2/2023 with close outpatient follow up within 1-2 weeks of discharge. Patient understands and agrees with plan of care.      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 On admission patient found to have sepsis, presumably 2/2 UTI.      Problem/Plan - 1:  ·  Problem: Sepsis secondary to UTI.   ·  Plan: Patient with sepsis on admission presumably 2/2 UTI  Cxs obtained, follow up results and treat accordingly   - On IV Zosyn, switched to PO cefpodoxime 200 mg BID x 7 days upon discharge  Urine Cx multi organism, contaminated  - Krueger placed by urology on 12/30, now removed 1/1, passed TOV  - Straight cath to be avoided per family as when placed, was traumatic with copious gian red blood with residual blood tinged urine  - kidney bladder ultrasound showed: Difficult to delineate bilateral hydronephrosis versus parapelvic cysts. Irregular asymmetric thickening of the bladder wall with suggestion of the dilated distal ureter.  CT urogram is recommended for further evaluation  - Urology outpatient f/u with outpt urologist through [facility]   - no longer needs catheter unless he is unable to void or experiences suprapubic discomfort, urology recommends outpatient f/u  - pcp f/u in 1-2 weeks     Problem/Plan - 2:  ·  Problem: Hyponatremia.   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  - Cortisol elevated, nephrology requesting endocrinology consult to comment if any further w/u required  - Endocrine consulted, per endocrine no suspicion for adrenal insufficiency as no hypothyroidism and cortisol is elevated     Problem/Plan - 3:  ·  Problem: Cholangiocarcinoma.   ·  Plan: Oncology consulted, follow up if further recs  continue outpatient follow up.     Problem/Plan - 4:  ·  Problem: Hypertension.   ·  Plan: Monitor for now, BP soft, hold any antihypertensive.     Problem/Plan - 5:  ·  Problem: Prophylactic measure.   ·  Plan: DVT and gI PPX.    Dispo: Home with Home care, no PT needs    Patient seen and evaluated, no acute somatic complaints. Reviewed discharge medications with patient; All new medications requiring new prescriptions were sent to the pharmacy of patient's choice. Reviewed need for prescription for previous home medications and new prescriptions sent if requested. Medically cleared/stable for discharge as per Dr. Otero on 1/2/2023 with close outpatient follow up within 1-2 weeks of discharge. Patient understands and agrees with plan of care.      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 On admission patient found to have sepsis, presumably 2/2 UTI.      Problem/Plan - 1:  ·  Problem: Sepsis secondary to UTI.   ·  Plan: Patient with sepsis on admission presumably 2/2 UTI  Cxs obtained, follow up results and treat accordingly   - On IV Zosyn, switched to PO cefpodoxime 200 mg BID x 7 days upon discharge  Urine Cx multi organism, contaminated  - Krueger placed by urology on 12/30, now removed 1/1, passed TOV  - Straight cath to be avoided per family as when placed, was traumatic with copious gian red blood with residual blood tinged urine  - kidney bladder ultrasound showed: Difficult to delineate bilateral hydronephrosis versus parapelvic cysts. Irregular asymmetric thickening of the bladder wall with suggestion of the dilated distal ureter.  CT urogram is recommended for further evaluation, per urology ok for outpatient f/u  - Urology outpatient f/u with outpt urologist through [facility]   - no longer needs catheter unless he is unable to void or experiences suprapubic discomfort, urology recommends outpatient f/u  - pcp f/u in 1-2 weeks     Problem/Plan - 2:  ·  Problem: Hyponatremia.   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  - Cortisol elevated, nephrology requesting endocrinology consult to comment if any further w/u required  - Endocrine consulted, per endocrine no suspicion for adrenal insufficiency as no hypothyroidism and cortisol is elevated     Problem/Plan - 3:  ·  Problem: Cholangiocarcinoma.   ·  Plan: Oncology consulted, follow up if further recs  continue outpatient follow up.     Problem/Plan - 4:  ·  Problem: Hypertension.   ·  Plan: Monitor for now, BP soft, hold any antihypertensive.     Problem/Plan - 5:  ·  Problem: Prophylactic measure.   ·  Plan: DVT and gI PPX.    Dispo: Home with Home care referral placed, no PT needs    Patient seen and evaluated, no acute somatic complaints. Reviewed discharge medications with patient; All new medications requiring new prescriptions were sent to the pharmacy of patient's choice. Reviewed need for prescription for previous home medications and new prescriptions sent if requested. Medically cleared/stable for discharge as per Dr. Otero on 1/2/2023 with close outpatient follow up within 1-2 weeks of discharge. Patient understands and agrees with plan of care.      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 On admission patient found to have sepsis, presumably 2/2 UTI.      Problem/Plan - 1:  ·  Problem: Sepsis secondary to UTI.   ·  Plan: Patient with sepsis on admission presumably 2/2 UTI  Cxs obtained, follow up results and treat accordingly   - On IV Zosyn, switched to PO cefpodoxime 200 mg BID x 7 days upon discharge  Urine Cx multi organism, contaminated  - Krueger placed by urology on 12/30, now removed 1/1, passed TOV  - Straight cath to be avoided per family as when placed, was traumatic with copious gian red blood with residual blood tinged urine  - kidney bladder ultrasound showed: Difficult to delineate bilateral hydronephrosis versus parapelvic cysts. Irregular asymmetric thickening of the bladder wall with suggestion of the dilated distal ureter.  CT urogram is recommended for further evaluation, per urology ok for outpatient f/u  - Urology outpatient f/u with outpt urologist through [facility]   - no longer needs catheter unless he is unable to void or experiences suprapubic discomfort, urology recommends outpatient f/u  - pcp f/u in 1-2 weeks     Problem/Plan - 2:  ·  Problem: Hyponatremia.   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  - Cortisol elevated, nephrology requesting endocrinology consult to comment if any further w/u required  - Endocrine consulted, per endocrine no suspicion for adrenal insufficiency as no hypothyroidism and cortisol is elevated     Problem/Plan - 3:  ·  Problem: Cholangiocarcinoma.   ·  Plan: Oncology consulted, follow up if further recs  continue outpatient follow up.     Problem/Plan - 4:  ·  Problem: Hypertension.   ·  Plan: Monitor for now, BP soft, hold any antihypertensive.     Problem/Plan - 5:  ·  Problem: Prophylactic measure.   ·  Plan: DVT and gI PPX.    Dispo: Home with Home care referral placed, no PT needs    Patient seen and evaluated, no acute somatic complaints. Reviewed discharge medications with patient; All new medications requiring new prescriptions were sent to the pharmacy of patient's choice. Reviewed need for prescription for previous home medications and new prescriptions sent if requested. Medically cleared/stable for discharge as per Dr. Otero on 1/2/2023 with close outpatient follow up within 1-2 weeks of discharge. Patient understands and agrees with plan of care.             Discussed in detail with family at the bedside  all questions were answered   follow up with SABRINA

## 2023-01-02 NOTE — DISCHARGE NOTE PROVIDER - NSDCMRMEDTOKEN_GEN_ALL_CORE_FT
Bystolic 5 mg oral tablet: 1 tab(s) orally once a day  tamsulosin 0.4 mg oral capsule: 1 cap(s) orally once a day   cefpodoxime 200 mg oral tablet: 1 tab(s) orally 2 times a day   melatonin 3 mg oral tablet: 1 tab(s) orally once a day (at bedtime), As needed, Insomnia  tamsulosin 0.4 mg oral capsule: 1 cap(s) orally 2 times a day

## 2023-01-02 NOTE — CONSULT NOTE ADULT - SUBJECTIVE AND OBJECTIVE BOX
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 On admission patient found to have sepsis, presumably 2/2 UTI. Patient also complains of ongoing dizziness with nausea, however he denies nausea or vomiting. He also denies shortness of breath, abdominal pain, dysuria, diarrhea or sick contacts.     Patient denies any previous history of adrenal related issues. Denies any history of long term steroid use. He denies any thyroid disease in self or family.   States that he feels significantly better than when he initially came in on admission.     PAST MEDICAL & SURGICAL HISTORY:  Cholangiocarcinoma  Hypertension      Review of Systems:   CONSTITUTIONAL: No fever, weight loss, or fatigue  EYES: No eye pain, visual disturbances, or discharge  ENMT:  No difficulty hearing, tinnitus, vertigo; No sinus or throat pain  NECK: No pain or stiffness  RESPIRATORY: No cough, wheezing, chills or hemoptysis; No shortness of breath  CARDIOVASCULAR: No chest pain, palpitations, dizziness, or leg swelling  GASTROINTESTINAL: No abdominal or epigastric pain. No nausea, vomiting, or hematemesis; No diarrhea or constipation. No melena or hematochezia.  GENITOURINARY: No dysuria, frequency, hematuria, or incontinence  NEUROLOGICAL: No headaches, memory loss, loss of strength, numbness, or tremors  SKIN: No itching, burning, rashes, or lesions   LYMPH NODES: No enlarged glands  ENDOCRINE: No heat or cold intolerance; No hair loss  MUSCULOSKELETAL: No joint pain or swelling; No muscle, back, or extremity pain  PSYCHIATRIC: No depression, anxiety, mood swings, or difficulty sleeping  HEME/LYMPH: No easy bruising, or bleeding gums  ALLERGY AND IMMUNOLOGIC: No hives or eczema    Allergies    No Known Allergies    Intolerances        Social History: Denies active smoking, drinking or drug use.     FAMILY HISTORY: No pertinent family history.      MEDICATIONS  (STANDING):  piperacillin/tazobactam IVPB.. 3.375 Gram(s) IV Intermittent every 8 hours    MEDICATIONS  (PRN):      T(C): 36.9 (22 @ 05:01), Max: 38.4 (22 @ 22:44)  HR: 81 (22 @ 05:01) (73 - 98)  BP: 107/54 (22 @ 05:01) (98/60 - 133/73)  RR: 17 (22 @ 05:01) (16 - 22)  SpO2: 98% (22 @ 05:01) (98% - 100%)    CAPILLARY BLOOD GLUCOSE      POCT Blood Glucose.: 109 mg/dL (29 Dec 2022 21:08)    I&O's Summary      PHYSICAL EXAM:  GENERAL: NAD, well-developed, looks younger than his age  HEAD:  Atraumatic, Normocephalic  EYES: EOMI, PERRLA, conjunctiva and sclera clear  NECK: Supple, No elevated JVD  CHEST/LUNG: Clear to auscultation bilaterally; No wheeze  HEART: Regular rate and rhythm; No murmurs, rubs, or gallops  ABDOMEN: Soft, Nontender, Nondistended; Bowel sounds present  EXTREMITIES:  2+ Peripheral Pulses, No clubbing, cyanosis, or edema  PSYCH: AAOx3  NEUROLOGY: CN II-XII grossly intact, moving all extremities  SKIN: No rashes or lesions    LABS:                        12.3   4.03  )-----------( 203      ( 29 Dec 2022 23:48 )             35.7     12    122<L>  |  88<L>  |  15  ----------------------------<  116<H>  3.7   |  23  |  1.24    Ca    8.6      29 Dec 2022 23:48    TPro  6.8  /  Alb  3.3  /  TBili  0.9  /  DBili  x   /  AST  27  /  ALT  27  /  AlkPhos  70  12-    PT/INR - ( 29 Dec 2022 23:48 )   PT: 17.7 sec;   INR: 1.52 ratio         PTT - ( 29 Dec 2022 23:48 )  PTT:25.9 sec      Urinalysis Basic - ( 29 Dec 2022 23:50 )    Color: Light Yellow / Appearance: Turbid / S.013 / pH: x  Gluc: x / Ketone: Negative  / Bili: Negative / Urobili: <2 mg/dL   Blood: x / Protein: 100 mg/dL / Nitrite: Positive   Leuk Esterase: Large / RBC: 10 /HPF / WBC >720 /HPF   Sq Epi: x / Non Sq Epi: 1 /HPF / Bacteria: Occasional          Review of Systems:  Constitutional: No fever, good appetite/po intake  Eyes: No blurry vision, diplopia  Neuro: No tremors  HEENT: No pain  Cardiovascular: No chest pain, palpitations  Respiratory: No SOB, no cough  GI: No nausea, vomiting,   : No dysuria, hematuria  Skin: no rash  Psych: no depression  Endocrine: no polyuria, polydipsia  Hem/lymph: no swelling  Osteoporosis: no fractures    ALL OTHER SYSTEMS REVIEWED AND NEGATIVE    PHYSICAL EXAM:  VITALS: T(C): 36.6 (23 @ 09:50)  T(F): 97.9 (23 @ 09:50), Max: 98.7 (23 @ 22:34)  HR: 85 (23 @ 09:50) (85 - 85)  BP: 99/66 (23 @ 09:50) (99/66 - 118/82)  RR:  (16 - 16)  SpO2:  (98% - 100%)  Wt(kg): --  GENERAL: NAD, well-groomed, well-developed  EYES: No proptosis, extraocular movements intact,  no lid lag, anicteric  HEENT:  Atraumatic, Normocephalic, moist mucous membranes  THYROID: Normal size, no palpable nodules, no thyromegaly  RESPIRATORY: Clear to auscultation bilaterally; No rales, rhonchi, wheezing, or rubs  CARDIOVASCULAR: Regular rate and rhythm; No murmurs; no peripheral edema  GI: Soft, nontender, non distended, normal bowel sounds  SKIN: Dry, intact, No rashes or lesions  EXTREMITIES: No foot ulcers, distal pedal pulses intact bilaterally  NEURO: sensation intact, no tremors  PSYCH: reactive affect, euthymic mood  CUSHING'S SIGNS: no striae or visible bruising                              11.9   6.00  )-----------( 608      ( 2023 08:27 )             35.5           132<L>  |  97<L>  |  10  ----------------------------<  110<H>  4.0   |  25  |  1.08    eGFR: 67    Ca    8.6        Mg     1.80       Phos  3.2         TPro  6.5  /  Alb  3.0<L>  /  TBili  0.5  /  DBili  x   /  AST  22  /  ALT  25  /  AlkPhos  70        Thyroid Function Tests:   @ 06:24 TSH 0.93 FreeT4 -- T3 -- Anti TPO -- Anti Thyroglobulin Ab -- TSI --   @ 06:04 TSH 0.90 FreeT4 -- T3 -- Anti TPO -- Anti Thyroglobulin Ab -- TSI --       Chol 125 Direct LDL -- LDL calculated 82 HDL 24<L> Trig 93    Radiology:

## 2023-01-02 NOTE — PROGRESS NOTE ADULT - NS ATTEND AMEND GEN_ALL_CORE FT
86 y/o male with metastatic cholangiocarcinoma admitted with sepsis and hyponatremia. Sodium now much improved. Patient doing better except for intermittent mild cough. Dispo planning. Close monitoring outpatient of electrolyte level.

## 2023-01-02 NOTE — PROGRESS NOTE ADULT - SUBJECTIVE AND OBJECTIVE BOX
[facility] [place] NEPHROLOGY PRACTICE   MD NAVEEN CARDENAS MD KRISTINE SOLTANPOUR, LUCAS DAVIS    TEL:  OFFICE: [phone]  From 5pm-7am Answering Service [phone]    -- RENAL FOLLOW UP NOTE ---Date of Service 12-31-22 @ 22:58    Patient is a 87y old  Male who presents with a chief complaint of Lab abnormality (31 Dec 2022 10:18)      Patient seen and examined at bedside. No chest pain/sob    VITALS:  T(F): 98.3 (12-31-22 @ 21:47), Max: 100.5 (12-31-22 @ 17:33)  HR: 84 (12-31-22 @ 21:47)  BP: 95/50 (12-31-22 @ 21:47)  RR: 16 (12-31-22 @ 21:47)  SpO2: 99% (12-31-22 @ 21:47)  Wt(kg): --    12-30 @ 07:01  -  12-31 @ 07:00  --------------------------------------------------------  IN: 0 mL / OUT: 1770 mL / NET: -1770 mL    12-31 @ 07:01  -  12-31 @ 22:58  --------------------------------------------------------  IN: 510 mL / OUT: 660 mL / NET: -150 mL          PHYSICAL EXAM:  General: NAD  Neck: No JVD  Respiratory: CTAB, no wheezes, rales or rhonchi  Cardiovascular: S1, S2, RRR  Gastrointestinal: BS+, soft, NT/ND  Extremities: No peripheral edema    Hospital Medications:   MEDICATIONS  (STANDING):  enoxaparin Injectable 40 milliGRAM(s) SubCutaneous every 24 hours  piperacillin/tazobactam IVPB.. 3.375 Gram(s) IV Intermittent every 8 hours  tamsulosin 0.4 milliGRAM(s) Oral two times a day      LABS:  12-31    126<L>  |  94<L>  |  11  ----------------------------<  106<H>  3.0<L>   |  22  |  1.14    Ca    8.0<L>      31 Dec 2022 06:04  Phos  2.9     12-30  Mg     1.80     12-30    TPro  5.9<L>  /  Alb  2.7<L>  /  TBili  0.5  /  DBili      /  AST  15  /  ALT  17  /  AlkPhos  60  12-31    Creatinine Trend: 1.14 <--, 1.21 <--, 1.20 <--, 1.24 <--    Albumin, Serum: 2.7 g/dL (12-31 @ 06:04)                              11.4   6.67  )-----------( 342      ( 31 Dec 2022 06:04 )             33.4     Urine Studies:  Urinalysis - [12-29-22 @ 23:50]      Color Light Yellow / Appearance Turbid / SG 1.013 / pH 6.5      Gluc Negative / Ketone Negative  / Bili Negative / Urobili <2 mg/dL       Blood Moderate / Protein 100 mg/dL / Leuk Est Large / Nitrite Positive      RBC 10 / WBC >720 / Hyaline 1 / Gran  / Sq Epi  / Non Sq Epi 1 / Bacteria Occasional    Urine Osmolality 256      [12-30-22 @ 00:15]    TSH 0.90      [12-31-22 @ 06:04]  Lipid: chol 125, TG 93, HDL 24, LDL --      [12-31-22 @ 06:04]        RADIOLOGY & ADDITIONAL STUDIES:  
[facility] [place] [facility]   MD NAVEEN CARDENAS MD KRISTINE SOLTANPOUR, LUCAS DAVIS    TEL:  OFFICE: [phone]  From 5pm-7am Answering Service [phone]    -- RENAL FOLLOW UP NOTE ---Date of Service 01-01-23 @ 18:11    Patient is a 87y old  Male who presents with a chief complaint of Lab abnormality (01 Jan 2023 11:48)      Patient seen and examined at bedside. No chest pain/sob    VITALS:  T(F): 98.8 (01-01-23 @ 09:52), Max: 98.8 (01-01-23 @ 09:52)  HR: 88 (01-01-23 @ 09:52)  BP: 125/59 (01-01-23 @ 09:52)  RR: 16 (01-01-23 @ 09:52)  SpO2: 98% (01-01-23 @ 09:52)  Wt(kg): --    12-31 @ 07:01 - 01-01 @ 07:00  --------------------------------------------------------  IN: 510 mL / OUT: 1260 mL / NET: -750 mL    01-01 @ 07:01  -  01-01 @ 18:11  --------------------------------------------------------  IN: 0 mL / OUT: 350 mL / NET: -350 mL          PHYSICAL EXAM:  General: NAD  Neck: No JVD  Respiratory: CTAB, no wheezes, rales or rhonchi  Cardiovascular: S1, S2, RRR  Gastrointestinal: BS+, soft, NT/ND  Extremities: No peripheral edema    Hospital Medications:   MEDICATIONS  (STANDING):  enoxaparin Injectable 40 milliGRAM(s) SubCutaneous every 24 hours  mupirocin 2% Ointment 1 Application(s) Both Nostrils two times a day  piperacillin/tazobactam IVPB.. 3.375 Gram(s) IV Intermittent every 8 hours  tamsulosin 0.4 milliGRAM(s) Oral two times a day      LABS:  01-01    133<L>  |  98  |  10  ----------------------------<  107<H>  3.7   |  24  |  1.04    Ca    8.0<L>      01 Jan 2023 06:24  Phos  2.3     01-01  Mg     1.90     01-01    TPro  5.9<L>  /  Alb  2.7<L>  /  TBili  0.5  /  DBili      /  AST  15  /  ALT  17  /  AlkPhos  60  12-31    Creatinine Trend: 1.04 <--, 1.04 <--, 1.14 <--, 1.21 <--, 1.20 <--, 1.24 <--    Phosphorus Level, Serum: 2.3 mg/dL (01-01 @ 06:24)  Phosphorus Level, Serum: 2.0 mg/dL (01-01 @ 00:24)                              10.5   6.65  )-----------( 458      ( 01 Jan 2023 06:24 )             31.6     Urine Studies:  Urinalysis - [12-29-22 @ 23:50]      Color Light Yellow / Appearance Turbid / SG 1.013 / pH 6.5      Gluc Negative / Ketone Negative  / Bili Negative / Urobili <2 mg/dL       Blood Moderate / Protein 100 mg/dL / Leuk Est Large / Nitrite Positive      RBC 10 / WBC >720 / Hyaline 1 / Gran  / Sq Epi  / Non Sq Epi 1 / Bacteria Occasional    Urine Sodium 81      [01-01-23 @ 06:30]  Urine Chloride 86      [01-01-23 @ 06:30]  Urine Osmolality 325      [01-01-23 @ 06:30]    TSH 0.93      [01-01-23 @ 06:24]  Lipid: chol 125, TG 93, HDL 24, LDL --      [12-31-22 @ 06:04]        RADIOLOGY & ADDITIONAL STUDIES:  
Patient is a 87y old  Male who presents with a chief complaint of Lab abnormality (02 Jan 2023 13:00)    Patient seen this afternoon, with daughter at bedside. He feels fine, but reports a mild intermittent cough, though had no cough when participating with physical therapist. Discharge planning in progress.     MEDICATIONS  (STANDING):  enoxaparin Injectable 40 milliGRAM(s) SubCutaneous every 24 hours  mupirocin 2% Ointment 1 Application(s) Both Nostrils two times a day  piperacillin/tazobactam IVPB.. 3.375 Gram(s) IV Intermittent every 8 hours  tamsulosin 0.4 milliGRAM(s) Oral two times a day    MEDICATIONS  (PRN):  acetaminophen     Tablet .. 650 milliGRAM(s) Oral every 6 hours PRN Temp greater or equal to 38C (100.4F), Mild Pain (1 - 3)  aluminum hydroxide/magnesium hydroxide/simethicone Suspension 30 milliLiter(s) Oral every 4 hours PRN Dyspepsia  melatonin 3 milliGRAM(s) Oral at bedtime PRN Insomnia  ondansetron Injectable 4 milliGRAM(s) IV Push every 8 hours PRN Nausea and/or Vomiting        Vital Signs Last 24 Hrs  T(C): 36.6 (02 Jan 2023 09:50), Max: 37.1 (01 Jan 2023 22:34)  T(F): 97.9 (02 Jan 2023 09:50), Max: 98.7 (01 Jan 2023 22:34)  HR: 85 (02 Jan 2023 09:50) (85 - 85)  BP: 99/66 (02 Jan 2023 09:50) (99/66 - 118/82)  BP(mean): --  RR: 16 (02 Jan 2023 09:50) (16 - 16)  SpO2: 100% (02 Jan 2023 09:50) (98% - 100%)    Parameters below as of 02 Jan 2023 09:50  Patient On (Oxygen Delivery Method): room air        PE  NAD  Awake, alert  Anicteric, MMM  No c/c/e  No rash grossly  FROM                          11.9   6.00  )-----------( 608      ( 02 Jan 2023 08:27 )             35.5       01-02    132<L>  |  97<L>  |  10  ----------------------------<  110<H>  4.0   |  25  |  1.08    Ca    8.6      02 Jan 2023 08:27  Phos  3.2     01-02  Mg     1.80     01-02    TPro  6.5  /  Alb  3.0<L>  /  TBili  0.5  /  DBili  x   /  AST  22  /  ALT  25  /  AlkPhos  70  01-02      
Patient is a 87y old  Male who presents with a chief complaint of Lab abnormality (30 Dec 2022 17:17)    Patient seen and examined this morning at bedside. Patient noted resting comfortably in bed, offers no new complaints.      MEDICATIONS  (STANDING):  enoxaparin Injectable 40 milliGRAM(s) SubCutaneous every 24 hours  piperacillin/tazobactam IVPB.. 3.375 Gram(s) IV Intermittent every 8 hours  potassium chloride    Tablet ER 40 milliEquivalent(s) Oral every 4 hours  tamsulosin 0.4 milliGRAM(s) Oral two times a day    MEDICATIONS  (PRN):  acetaminophen     Tablet .. 650 milliGRAM(s) Oral every 6 hours PRN Temp greater or equal to 38C (100.4F), Mild Pain (1 - 3)  aluminum hydroxide/magnesium hydroxide/simethicone Suspension 30 milliLiter(s) Oral every 4 hours PRN Dyspepsia  melatonin 3 milliGRAM(s) Oral at bedtime PRN Insomnia  ondansetron Injectable 4 milliGRAM(s) IV Push every 8 hours PRN Nausea and/or Vomiting      ROS  No fever, sweats, chills  No epistaxis, HA, sore throat  No CP, SOB, cough, sputum  No n/v/d, abd pain, melena, hematochezia  No edema  No rash  No anxiety  No back pain, joint pain  No bleeding, bruising  No dysuria,   +hematuria     Vital Signs Last 24 Hrs  T(C): 37.6 (31 Dec 2022 06:20), Max: 38.1 (30 Dec 2022 10:30)  T(F): 99.6 (31 Dec 2022 06:20), Max: 100.5 (30 Dec 2022 10:30)  HR: 92 (31 Dec 2022 06:20) (89 - 100)  BP: 99/56 (31 Dec 2022 06:20) (94/64 - 132/65)  BP(mean): --  RR: 18 (31 Dec 2022 06:20) (17 - 18)  SpO2: 100% (31 Dec 2022 06:20) (98% - 100%)    Parameters below as of 31 Dec 2022 06:20  Patient On (Oxygen Delivery Method): room air        PE  NAD  Awake, alert  Anicteric, MMM  RRR  CTAB  Abd soft, NT, ND  +white   No c/c/e  No rash grossly                            11.4   6.67  )-----------( 342      ( 31 Dec 2022 06:04 )             33.4       12-31    126<L>  |  94<L>  |  11  ----------------------------<  106<H>  3.0<L>   |  22  |  1.14    Ca    8.0<L>      31 Dec 2022 06:04  Phos  2.9     12-30  Mg     1.80     12-30    TPro  5.9<L>  /  Alb  2.7<L>  /  TBili  0.5  /  DBili  x   /  AST  15  /  ALT  17  /  AlkPhos  60  12-31      
no new changes    acetaminophen     Tablet .. 650 milliGRAM(s) Oral every 6 hours PRN  aluminum hydroxide/magnesium hydroxide/simethicone Suspension 30 milliLiter(s) Oral every 4 hours PRN  enoxaparin Injectable 40 milliGRAM(s) SubCutaneous every 24 hours  melatonin 3 milliGRAM(s) Oral at bedtime PRN  ondansetron Injectable 4 milliGRAM(s) IV Push every 8 hours PRN  piperacillin/tazobactam IVPB.. 3.375 Gram(s) IV Intermittent every 8 hours  tamsulosin 0.4 milliGRAM(s) Oral two times a day      No Known Allergies      ROS otherwise negative     T(C): 37.1 (01-01-23 @ 09:52), Max: 38.1 (12-31-22 @ 17:33)  HR: 88 (01-01-23 @ 09:52) (77 - 97)  BP: 125/59 (01-01-23 @ 09:52) (95/50 - 125/59)  RR: 16 (01-01-23 @ 09:52) (16 - 16)  SpO2: 98% (01-01-23 @ 09:52) (98% - 100%)                            10.5   6.65  )-----------( 458      ( 01 Jan 2023 06:24 )             31.6                         11.4   6.67  )-----------( 342      ( 31 Dec 2022 06:04 )             33.4                         11.3   5.54  )-----------( 291      ( 30 Dec 2022 22:07 )             32.3   01-01    133<L>  |  98  |  10  ----------------------------<  107<H>  3.7   |  24  |  1.04    Ca    8.0<L>      01 Jan 2023 06:24  Phos  2.3     01-01  Mg     1.90     01-01    TPro  5.9<L>  /  Alb  2.7<L>  /  TBili  0.5  /  DBili  x   /  AST  15  /  ALT  17  /  AlkPhos  60  12-31    
Name of Patient : LENA BATISTA  MRN: [id number]  Date of visit: 01-01-23       Subjective: Patient seen and examined. No new events except as noted.       REVIEW OF SYSTEMS:    CONSTITUTIONAL: No weakness, fevers or chills  EYES/ENT: No visual changes;  No vertigo or throat pain   NECK: No pain or stiffness  RESPIRATORY: No cough, wheezing, hemoptysis; No shortness of breath  CARDIOVASCULAR: No chest pain or palpitations  GASTROINTESTINAL: No abdominal or epigastric pain. No nausea, vomiting, or hematemesis; No diarrhea or constipation. No melena or hematochezia.  GENITOURINARY: No dysuria, frequency or hematuria  NEUROLOGICAL: No numbness or weakness  SKIN: No itching, burning, rashes, or lesions   All other review of systems is negative unless indicated above.    MEDICATIONS:  MEDICATIONS  (STANDING):  enoxaparin Injectable 40 milliGRAM(s) SubCutaneous every 24 hours  mupirocin 2% Ointment 1 Application(s) Both Nostrils two times a day  tamsulosin 0.4 milliGRAM(s) Oral two times a day      PHYSICAL EXAM:  T(C): 37.1 (01-01-23 @ 22:34), Max: 37.1 (01-01-23 @ 09:52)  HR: 85 (01-01-23 @ 22:34) (77 - 88)  BP: 118/82 (01-01-23 @ 22:34) (118/82 - 125/59)  RR: 16 (01-01-23 @ 22:34) (16 - 16)  SpO2: 98% (01-01-23 @ 22:34) (98% - 99%)  Wt(kg): --  I&O's Summary    31 Dec 2022 07:01  -  01 Jan 2023 07:00  --------------------------------------------------------  IN: 510 mL / OUT: 1260 mL / NET: -750 mL    01 Jan 2023 07:01  -  01 Jan 2023 22:56  --------------------------------------------------------  IN: 0 mL / OUT: 500 mL / NET: -500 mL          Appearance: Normal	  HEENT:  PERRLA   Lymphatic: No lymphadenopathy   Cardiovascular: Normal S1 S2, no JVD  Respiratory: normal effort , clear  Gastrointestinal:  Soft, Non-tender  Skin: No rashes,  warm to touch  Psychiatry:  Mood & affect appropriate  Musculuskeletal: No edema      All labs, Imaging and EKGs personally reviewed     12-31-22 @ 07:01  -  01-01-23 @ 07:00  --------------------------------------------------------  IN: 510 mL / OUT: 1260 mL / NET: -750 mL    01-01-23 @ 07:01  -  01-01-23 @ 22:56  --------------------------------------------------------  IN: 0 mL / OUT: 500 mL / NET: -500 mL                          10.5   6.65  )-----------( 458      ( 01 Jan 2023 06:24 )             31.6               01-01    133<L>  |  98  |  10  ----------------------------<  107<H>  3.7   |  24  |  1.04    Ca    8.0<L>      01 Jan 2023 06:24  Phos  2.3     01-01  Mg     1.90     01-01    TPro  5.9<L>  /  Alb  2.7<L>  /  TBili  0.5  /  DBili  x   /  AST  15  /  ALT  17  /  AlkPhos  60  12-31                                 
Name of Patient : LENA BATISTA  MRN: [id number]  Date of visit: 22       Subjective: Patient seen and examined. No new events except as noted.     REVIEW OF SYSTEMS:    CONSTITUTIONAL: No weakness, fevers or chills  EYES/ENT: No visual changes;  No vertigo or throat pain   NECK: No pain or stiffness  RESPIRATORY: No cough, wheezing, hemoptysis; No shortness of breath  CARDIOVASCULAR: No chest pain or palpitations  GASTROINTESTINAL: No abdominal or epigastric pain. No nausea, vomiting, or hematemesis; No diarrhea or constipation. No melena or hematochezia.  GENITOURINARY: No dysuria, frequency or hematuria  NEUROLOGICAL: No numbness or weakness  SKIN: No itching, burning, rashes, or lesions   All other review of systems is negative unless indicated above.    MEDICATIONS:  MEDICATIONS  (STANDING):  enoxaparin Injectable 40 milliGRAM(s) SubCutaneous every 24 hours  piperacillin/tazobactam IVPB.. 3.375 Gram(s) IV Intermittent every 8 hours  tamsulosin 0.4 milliGRAM(s) Oral two times a day      PHYSICAL EXAM:  T(C): 36.8 (22 @ 21:47), Max: 38.1 (22 @ 17:33)  HR: 84 (22 @ 21:47) (84 - 100)  BP: 95/50 (22 @ 21:47) (95/47 - 118/62)  RR: 16 (22 @ 21:47) (16 - 18)  SpO2: 99% (22 @ 21:47) (99% - 100%)  Wt(kg): --  I&O's Summary    30 Dec 2022 07:  -  31 Dec 2022 07:00  --------------------------------------------------------  IN: 0 mL / OUT: 1770 mL / NET: -1770 mL    31 Dec 2022 07:  -  31 Dec 2022 23:30  --------------------------------------------------------  IN: 510 mL / OUT: 660 mL / NET: -150 mL          Appearance: Normal	  HEENT:  PERRLA   Lymphatic: No lymphadenopathy   Cardiovascular: Normal S1 S2, no JVD  Respiratory: normal effort , clear  Gastrointestinal:  Soft, Non-tender  Skin: No rashes,  warm to touch  Psychiatry:  Mood & affect appropriate  Musculuskeletal: No edema      All labs, Imaging and EKGs personally reviewed     22 @ 07:01  -  22 @ 07:00  --------------------------------------------------------  IN: 0 mL / OUT: 1770 mL / NET: -1770 mL    22 @ 07:01  -  22 @ 23:30  --------------------------------------------------------  IN: 510 mL / OUT: 660 mL / NET: -150 mL                          11.4   6.67  )-----------( 342      ( 31 Dec 2022 06:04 )             33.4                   126<L>  |  94<L>  |  11  ----------------------------<  106<H>  3.0<L>   |  22  |  1.14    Ca    8.0<L>      31 Dec 2022 06:04  Phos  2.9     12  Mg     1.80         TPro  5.9<L>  /  Alb  2.7<L>  /  TBili  0.5  /  DBili  x   /  AST  15  /  ALT  17  /  AlkPhos  60      PT/INR - ( 29 Dec 2022 23:48 )   PT: 17.7 sec;   INR: 1.52 ratio         PTT - ( 29 Dec 2022 23:48 )  PTT:25.9 sec                   Urinalysis Basic - ( 29 Dec 2022 23:50 )    Color: Light Yellow / Appearance: Turbid / S.013 / pH: x  Gluc: x / Ketone: Negative  / Bili: Negative / Urobili: <2 mg/dL   Blood: x / Protein: 100 mg/dL / Nitrite: Positive   Leuk Esterase: Large / RBC: 10 /HPF / WBC >720 /HPF   Sq Epi: x / Non Sq Epi: 1 /HPF / Bacteria: Occasional

## 2023-01-02 NOTE — PROGRESS NOTE ADULT - ASSESSMENT
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 On admission patient found to have sepsis, presumably 2/2 UTI. 
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 On admission patient found to have sepsis, presumably 2/2 UTI. Patient also complains of ongoing dizziness with nausea, however he denies nausea or vomiting. He also denies shortness of breath, abdominal pain, dysuria, diarrhea or sick contacts.   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 Will repeat again on 1/02  s/p 1.5%saline @50cc/hr x8hrs on 12/31 sodium improved  free water restriction <1L/day pt educated   monitor na closely. avoid overcorrection. na should not correct >8meq in 24 hrs, corrected 7 meq in 24 hrs  avoid hypotonic solutions. change all antibiotic to be given with ns if possible  monitor  Na(133)/K(3.7)/Cl(98)/HCO3(24)/BUN(10)/Cr(1.04)Glu(107)/Ca(8.0)/Mg(1.90)/PO4(2.3)    01-01 @ 06:24  Na(131)/K(3.7)/Cl(99)/HCO3(21)/BUN(12)/Cr(1.04)Glu(142)/Ca(7.7)/Mg(1.80)/PO4(2.0)    01-01 @ 00:24  Na(126)/K(3.0)/Cl(94)/HCO3(22)/BUN(11)/Cr(1.14)Glu(106)/Ca(8.0)/Mg(--)/PO4(--)    12-31 @ 06:04  Na(127)/K(3.5)/Cl(93)/HCO3(25)/BUN(14)/Cr(1.21)Glu(120)/Ca(7.9)/Mg(1.80)/PO4(2.9)    12-30 @ 22:07  Na(124)/K(3.5)/Cl(89)/HCO3(22)/BUN(14)/Cr(1.20)Glu(156)/Ca(8.0)/Mg(--)/PO4(--)    12-30 @ 14:21  Na(122)/K(3.7)/Cl(88)/HCO3(23)/BUN(15)/Cr(1.24)Glu(116)/Ca(8.6)/Mg(--)/PO4(--)    12-29 @ 23:48      Osmolality, Random Urine: 325 mosm/Kg (01-01 @ 06:30)  Sodium, Random Urine: 81 mmol/L (01-01 @ 06:30)  Chloride, Random Urine: 86 mmol/L (01-01 @ 06:30)    proteinuria/hematuria  getting treated for UTI  repeat ua post treatement     Hypophosphatemia  Supplement as needed.         
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 On admission patient found to have sepsis, presumably 2/2 UTI. 
This is an 87 year old male with stage IV cholangiocarcinoma who presents from [facility] clinic with sepsis secondary to UTI and hyponatremia.     1. Stage IV cholangiocarcinoma  -- Metastatic to liver   -- Began treatment with cis/gem/durvalumab, LD 12/20   -- No systemic treatment while admitted  -- Follow up with Dr. Chadd Urbano of [facility] after discharge    2. Sepsis 2/2 UTI  -- UA c/w UTI  -- On empiric Zosyn, follow up cultures     3. Hyponatremia  -- Sodium 122 on admission, 133 this morning   -- Nephrology consulted, on fluid restriction    D/w children    will request D/c white and trial of void    We will be happy to answer any onc questions on behalf of [facility] and will be in touch with them.    Javier Santillan MD  Hematology/Oncology  Weekends and nights please call [phone] for MD on call  Cell:  [phone]  Office Phone: [phone]  Office Fax:  [phone]  [address] [place] [address] 
This is an 87 year old male with stage IV cholangiocarcinoma who [place] [facility] [place] secondary to UTI and hyponatremia.     1. Stage IV cholangiocarcinoma  -- Metastatic to liver   -- Began treatment with cis/gem/durvalumab, LD 12/20   -- No systemic treatment while admitted  -- [place]. Chadd Urbano of [facility] [place]    2. Sepsis 2/2 UTI  -- UA c/w UTI  -- On empiric Zosyn, follow up cultures     3. Hyponatremia  -- Sodium 122 on admission, 126 this morning   -- Nephrology consulted, on fluid restriction    Will continue to follow and coordinate care with [facility].    Sonia Goodson NP  Hematology/ Oncology  [facility]  [phone] (office)  [phone] (alt office)  Evenings and weekends please call MD on call or office  
This is an 87 year old male with stage IV cholangiocarcinoma who presents from [facility] with sepsis secondary to UTI and hyponatremia.     1. Stage IV cholangiocarcinoma  -- Metastatic to liver   -- Began treatment with cis/gem/durvalumab, LD 12/20   -- No systemic treatment while admitted  -- Follow up with Dr. Chadd Urbano of [facility] after discharge    2. Sepsis 2/2 UTI  -- UA c/w UTI, urine culture with probably contamination   -- On Zosyn     3. Hyponatremia  -- Sodium 122 on admission, improved to 132   -- Nephrology following   -- [place] discontinued     4. cough  -- pt reports intermittent mild cough, though not observed by nurse or physical therapist during participation  -- consider RVP     Discharge planning in progress.  Will continue to follow and coordinate care with [facility].     Marybeth Oliver PA-C  Hematology/Oncology  [facility]  [phone] (office)  [phone] (alt office)  Evenings and weekends please call MD on call or office  
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 On admission patient found to have sepsis, presumably 2/2 UTI. Patient also complains of ongoing dizziness with nausea, however he denies nausea or vomiting. He also denies shortness of breath, abdominal pain, dysuria, diarrhea or sick contacts.   Nephrology consulted for hyponatremia    Hyponatremia  possible SIADH with polydipsia   pt admits to drink about 2 liter fluids daily, urine osmo is low   given history likely has SIADH as well  check urine na and osmo again  check tsh and cortisol level  BMP again at 23:00  Ordered earlier in day to start 1.5%saline @50cc/hr x8hrs.  free water restriction <1L/day pt educated   monitor na closely. avoid overcorrection. na should not correct >8meq in 24 hrs  avoid hypotonic solutions. change all antibiotic to be given with ns if possible  monitor    proteinuria/hematuria  getting treated for UTI  repeat ua post treatement

## 2023-01-02 NOTE — DISCHARGE NOTE NURSING/CASE MANAGEMENT/SOCIAL WORK - NSDCPNINST_GEN_ALL_CORE
Please be sure to schedule & attend all follow-up appointments post discharge. We recommend following up with your PCP in 1 week. Continue to take your medications as prescribed. It is important to fully complete your antibiotic course in its entirety in a timely manner. If you start to develop any signs/symptoms of infection such as fever >100.4, any difficulty or changes in your bladder or bowel patterns please notify your provider right away.

## 2023-01-02 NOTE — DISCHARGE NOTE NURSING/CASE MANAGEMENT/SOCIAL WORK - PATIENT PORTAL LINK FT
You can access the FollowMyHealth Patient Portal offered by [facility] by registering at the following website: [email or web address]. By joining [employer]’s FollowMyHealth portal, you will also be able to view your health information using other applications (apps) compatible with our system.

## 2023-01-02 NOTE — DISCHARGE NOTE PROVIDER - CARE PROVIDER_API CALL
LEIGHA LOERA  Internal Medicine  [address] [place] [address]  Phone: [phone]  Fax: [phone]  Follow Up Time: 2 weeks    Chadd Urbano  Hematology/Oncology  [address] [place] [address]  Phone: [phone]  Fax: [phone]  Follow Up Time:

## 2023-01-02 NOTE — PHYSICAL THERAPY INITIAL EVALUATION ADULT - ADDITIONAL COMMENTS
Patient lives alone in private home, 18 steps to enter and 8 steps to bedroom and 16 steps to basement. Patient was independent in all ADL prior to admission. Patient was not using an assistive device prior to admission.

## 2023-01-02 NOTE — PHYSICAL THERAPY INITIAL EVALUATION ADULT - PERTINENT HX OF CURRENT PROBLEM, REHAB EVAL
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 On admission patient found to have sepsis, presumably 2/2 UTI. Patient also complains of ongoing dizziness with nausea, however he denies nausea or vomiting. He also denies shortness of breath, abdominal pain, dysuria, diarrhea or sick contacts.

## 2023-01-03 RX ORDER — NEBIVOLOL HYDROCHLORIDE 5 MG/1
1 TABLET ORAL
Qty: 0 | Refills: 0 | DISCHARGE

## 2023-01-03 RX ORDER — TAMSULOSIN HYDROCHLORIDE 0.4 MG/1
1 CAPSULE ORAL
Qty: 0 | Refills: 0 | DISCHARGE

## 2023-01-04 LAB
CULTURE RESULTS: SIGNIFICANT CHANGE UP
CULTURE RESULTS: SIGNIFICANT CHANGE UP
SPECIMEN SOURCE: SIGNIFICANT CHANGE UP
SPECIMEN SOURCE: SIGNIFICANT CHANGE UP

## 2023-04-25 NOTE — PATIENT PROFILE ADULT - NSPROPOAPRESSUREINJURY_GEN_A_NUR
You can access the FollowMyHealth Patient Portal offered by [facility] by registering at the following website: [email or web address]. By joining [employer]’s FollowMyHealth portal, you will also be able to view your health information using other applications (apps) compatible with our system. no

## 2023-05-03 ENCOUNTER — INPATIENT (INPATIENT)
Facility: HOSPITAL | Age: 88
LOS: 11 days | Discharge: HOME HEALTH | DRG: 299 | End: 2023-05-15
Attending: INTERNAL MEDICINE | Admitting: INTERNAL MEDICINE
Payer: MEDICARE

## 2023-05-03 VITALS
OXYGEN SATURATION: 99 % | SYSTOLIC BLOOD PRESSURE: 118 MMHG | RESPIRATION RATE: 20 BRPM | HEART RATE: 106 BPM | TEMPERATURE: 98 F | DIASTOLIC BLOOD PRESSURE: 78 MMHG

## 2023-05-03 DIAGNOSIS — I26.99 OTHER PULMONARY EMBOLISM WITHOUT ACUTE COR PULMONALE: ICD-10-CM

## 2023-05-03 LAB
ALBUMIN SERPL ELPH-MCNC: 2.6 G/DL — LOW (ref 3.3–5)
ALP SERPL-CCNC: 96 U/L — SIGNIFICANT CHANGE UP (ref 40–120)
ALT FLD-CCNC: 16 U/L — SIGNIFICANT CHANGE UP (ref 10–45)
ANION GAP SERPL CALC-SCNC: 12 MMOL/L — SIGNIFICANT CHANGE UP (ref 5–17)
APTT BLD: 29.3 SEC — SIGNIFICANT CHANGE UP (ref 27.5–35.5)
AST SERPL-CCNC: 27 U/L — SIGNIFICANT CHANGE UP (ref 10–40)
BASE EXCESS BLDV CALC-SCNC: 0 MMOL/L — SIGNIFICANT CHANGE UP (ref -2–3)
BASOPHILS # BLD AUTO: 0.02 K/UL — SIGNIFICANT CHANGE UP (ref 0–0.2)
BASOPHILS NFR BLD AUTO: 0.2 % — SIGNIFICANT CHANGE UP (ref 0–2)
BILIRUB SERPL-MCNC: 0.6 MG/DL — SIGNIFICANT CHANGE UP (ref 0.2–1.2)
BUN SERPL-MCNC: 18 MG/DL — SIGNIFICANT CHANGE UP (ref 7–23)
CA-I SERPL-SCNC: 1.12 MMOL/L — LOW (ref 1.15–1.33)
CALCIUM SERPL-MCNC: 8 MG/DL — LOW (ref 8.4–10.5)
CHLORIDE BLDV-SCNC: 88 MMOL/L — LOW (ref 96–108)
CHLORIDE SERPL-SCNC: 89 MMOL/L — LOW (ref 96–108)
CO2 BLDV-SCNC: 26 MMOL/L — SIGNIFICANT CHANGE UP (ref 22–26)
CO2 SERPL-SCNC: 24 MMOL/L — SIGNIFICANT CHANGE UP (ref 22–31)
CREAT SERPL-MCNC: 0.96 MG/DL — SIGNIFICANT CHANGE UP (ref 0.5–1.3)
EGFR: 77 ML/MIN/1.73M2 — SIGNIFICANT CHANGE UP
EOSINOPHIL # BLD AUTO: 0.03 K/UL — SIGNIFICANT CHANGE UP (ref 0–0.5)
EOSINOPHIL NFR BLD AUTO: 0.3 % — SIGNIFICANT CHANGE UP (ref 0–6)
GAS PNL BLDV: 122 MMOL/L — LOW (ref 136–145)
GAS PNL BLDV: SIGNIFICANT CHANGE UP
GAS PNL BLDV: SIGNIFICANT CHANGE UP
GLUCOSE BLDV-MCNC: 100 MG/DL — HIGH (ref 70–99)
GLUCOSE SERPL-MCNC: 103 MG/DL — HIGH (ref 70–99)
HCO3 BLDV-SCNC: 25 MMOL/L — SIGNIFICANT CHANGE UP (ref 22–29)
HCT VFR BLD CALC: 32.8 % — LOW (ref 39–50)
HCT VFR BLDA CALC: 34 % — LOW (ref 39–51)
HGB BLD CALC-MCNC: 11.2 G/DL — LOW (ref 12.6–17.4)
HGB BLD-MCNC: 10.7 G/DL — LOW (ref 13–17)
IMM GRANULOCYTES NFR BLD AUTO: 2.8 % — HIGH (ref 0–0.9)
INR BLD: 1.4 RATIO — HIGH (ref 0.88–1.16)
LACTATE BLDV-MCNC: 1.6 MMOL/L — SIGNIFICANT CHANGE UP (ref 0.5–2)
LYMPHOCYTES # BLD AUTO: 1.21 K/UL — SIGNIFICANT CHANGE UP (ref 1–3.3)
LYMPHOCYTES # BLD AUTO: 11.7 % — LOW (ref 13–44)
MCHC RBC-ENTMCNC: 29 PG — SIGNIFICANT CHANGE UP (ref 27–34)
MCHC RBC-ENTMCNC: 32.6 GM/DL — SIGNIFICANT CHANGE UP (ref 32–36)
MCV RBC AUTO: 88.9 FL — SIGNIFICANT CHANGE UP (ref 80–100)
MONOCYTES # BLD AUTO: 0.83 K/UL — SIGNIFICANT CHANGE UP (ref 0–0.9)
MONOCYTES NFR BLD AUTO: 8 % — SIGNIFICANT CHANGE UP (ref 2–14)
NEUTROPHILS # BLD AUTO: 7.95 K/UL — HIGH (ref 1.8–7.4)
NEUTROPHILS NFR BLD AUTO: 77 % — SIGNIFICANT CHANGE UP (ref 43–77)
NRBC # BLD: 0 /100 WBCS — SIGNIFICANT CHANGE UP (ref 0–0)
NT-PROBNP SERPL-SCNC: 1454 PG/ML — HIGH (ref 0–300)
PCO2 BLDV: 40 MMHG — LOW (ref 42–55)
PH BLDV: 7.4 — SIGNIFICANT CHANGE UP (ref 7.32–7.43)
PLATELET # BLD AUTO: 134 K/UL — LOW (ref 150–400)
PO2 BLDV: 37 MMHG — SIGNIFICANT CHANGE UP (ref 25–45)
POTASSIUM BLDV-SCNC: 3.7 MMOL/L — SIGNIFICANT CHANGE UP (ref 3.5–5.1)
POTASSIUM SERPL-MCNC: 3.9 MMOL/L — SIGNIFICANT CHANGE UP (ref 3.5–5.3)
POTASSIUM SERPL-SCNC: 3.9 MMOL/L — SIGNIFICANT CHANGE UP (ref 3.5–5.3)
PROT SERPL-MCNC: 5.2 G/DL — LOW (ref 6–8.3)
PROTHROM AB SERPL-ACNC: 16.3 SEC — HIGH (ref 10.5–13.4)
RAPID RVP RESULT: SIGNIFICANT CHANGE UP
RBC # BLD: 3.69 M/UL — LOW (ref 4.2–5.8)
RBC # FLD: 16.8 % — HIGH (ref 10.3–14.5)
SAO2 % BLDV: 53.8 % — LOW (ref 67–88)
SARS-COV-2 RNA SPEC QL NAA+PROBE: SIGNIFICANT CHANGE UP
SODIUM SERPL-SCNC: 125 MMOL/L — LOW (ref 135–145)
TROPONIN T, HIGH SENSITIVITY RESULT: 28 NG/L — SIGNIFICANT CHANGE UP (ref 0–51)
WBC # BLD: 10.33 K/UL — SIGNIFICANT CHANGE UP (ref 3.8–10.5)
WBC # FLD AUTO: 10.33 K/UL — SIGNIFICANT CHANGE UP (ref 3.8–10.5)

## 2023-05-03 PROCEDURE — 99223 1ST HOSP IP/OBS HIGH 75: CPT

## 2023-05-03 PROCEDURE — 71045 X-RAY EXAM CHEST 1 VIEW: CPT | Mod: 26

## 2023-05-03 PROCEDURE — 99285 EMERGENCY DEPT VISIT HI MDM: CPT | Mod: FS

## 2023-05-03 RX ORDER — HEPARIN SODIUM 5000 [USP'U]/ML
5500 INJECTION INTRAVENOUS; SUBCUTANEOUS EVERY 6 HOURS
Refills: 0 | Status: DISCONTINUED | OUTPATIENT
Start: 2023-05-03 | End: 2023-05-04

## 2023-05-03 RX ORDER — HEPARIN SODIUM 5000 [USP'U]/ML
INJECTION INTRAVENOUS; SUBCUTANEOUS
Qty: 25000 | Refills: 0 | Status: DISCONTINUED | OUTPATIENT
Start: 2023-05-03 | End: 2023-05-04

## 2023-05-03 RX ORDER — HEPARIN SODIUM 5000 [USP'U]/ML
2500 INJECTION INTRAVENOUS; SUBCUTANEOUS EVERY 6 HOURS
Refills: 0 | Status: DISCONTINUED | OUTPATIENT
Start: 2023-05-03 | End: 2023-05-04

## 2023-05-03 RX ADMIN — HEPARIN SODIUM 1200 UNIT(S)/HR: 5000 INJECTION INTRAVENOUS; SUBCUTANEOUS at 20:45

## 2023-05-03 NOTE — ED ADULT NURSE NOTE - NSFALLRSKINDICATORS_ED_ALL_ED
[FreeTextEntry1] : 37 y/o \par \par 10/15 US: Anteverted uterus 8 cm. EMS 11.6 mm. multiple ecogenic foci, polyps. \par RO wnl\par LO 1.8 cm simple cyst. No FF\par \par #AUB\par #BMI 33\par #Possible endometrial polyps\par -US reviewed w/ patient\par -discussed that AUB is not related to her BTL\par -recommend endometrial sampling with visualization\par -EMS more thickened that expected after menses and increased risk of hyperplasia due to BMI\par -discussed hysteroscopy EMB, polypectomy in the office\par -discussed Hysteroscopy D+C with possible myosure in the OR\par -patient desires sedation for the procedure; will book in the OR\par -discussed risk, benefits, alternatives, recovery\par -discussed limitations of the procedure offered\par \par #Ovarian cyst\par -right cyst resolved\par -left cyst follicular; no further f/u needed for cyst\par \par 25 minutes spent in consult\par Surgical booking task sent\par \par #Abnormal pap\par -recommend repeat pap in 1 yr\par \par #c/o yeast infection\par -declines exam\par -terconazole script sent; patient understands she needs repeat cultures if not resolved\par \par -Dr. De
no

## 2023-05-03 NOTE — ED ADULT NURSE REASSESSMENT NOTE - NS ED NURSE REASSESS COMMENT FT1
Received report from RENALDO Nieto RN. Patient presenting to the ED with DVT and PE. Heparin running at 12 mL/hour. Confirmed with 2 RNs.

## 2023-05-03 NOTE — H&P ADULT - ASSESSMENT
NIGHT HOSPITALIST:   NIGHT HOSPITALIST:    B/L PE with right heart strain on CTT from outside imaging with R LE DVT>>already on heparin gtt with patient/family aware of risk/benefits of full AC as a life saving measure given the PE.   I have requested a formal evaluation from the PE team.   Will obtain an echo, follow troponin, BNP.    Will repeat the Duplex but would consider contacting family to obtain CD of CTT angiogram from today.    Patient presently refuses to eat.   Chronic hyponatremia with intact sensorium.   Daily weights.  Fluid restriction.  Follow Na+.    On tamsulosin for BPH symptoms past history of prostate CA presumed to be in remission.

## 2023-05-03 NOTE — ED PROVIDER NOTE - CARE PLAN
Principal Discharge DX:	Pulmonary embolism  Secondary Diagnosis:	Deep vein thrombosis (DVT) of popliteal vein of right lower extremity   1

## 2023-05-03 NOTE — ED PROVIDER NOTE - EKG ADDITIONAL QUESTION - PERFORMED INDEPENDENT VISUALIZATION
Subjective:       Patient ID:  Bianca Weldon is a 54 y.o. female who presents for   Chief Complaint   Patient presents with    Follow-up     Hx of rash, began after shoulder surgery on 2/8/16, last seen on 3/23/17, irritant vs. Allergic contact dermatitis.  She is s/p use of xyzal, hydroxyzine and betamethasone ointment with some improvement.  + pruritus, sensitivity and pain.          Review of Systems   Constitutional: Negative for fever and chills.   Gastrointestinal: Negative for nausea and vomiting.   Skin: Positive for itching. Negative for daily sunscreen use, activity-related sunscreen use and recent sunburn.   Hematologic/Lymphatic: Does not bruise/bleed easily.        Objective:    Physical Exam   Constitutional: She appears well-developed and well-nourished. No distress.   Neurological: She is alert and oriented to person, place, and time. She is not disoriented.   Psychiatric: She has a normal mood and affect.   Skin:   Areas Examined (abnormalities noted in diagram):   Head / Face Inspection Performed  Neck Inspection Performed  Chest / Axilla Inspection Performed  RUE Inspected  LUE Inspection Performed  Nails and Digits Inspection Performed                 Assessment / Plan:        Keloid  -     triamcinolone acetonide injection 10 mg; 1 mL (10 mg total) by Other route one time.  -     S/p trauma vs. Irritant/allergic contact after shoulder surgery.  Continue d/c off xyzal, hydroxyzine and betamethasone ointment. ILK #1 done today.  After risks, benefits and alternatives explained and side effect profile reviewed, including hypopigmentation, telangiectasia and atrophy, the patient verbally consented to ILK injection. A total of 0.5 cc of kenalog 10 mg/ml was injected into the keloid of the left upper arm.  Pt tolerated well without side effects.  RTC in 8 weeks for repeat injection.             Return in about 2 months (around 8/7/2017).   Yes

## 2023-05-03 NOTE — ED ADULT NURSE NOTE - NSIMPLEMENTINTERV_GEN_ALL_ED
Implemented All Fall with Harm Risk Interventions:  [place] to call system. Call bell, personal items and telephone within reach. Instruct patient to call for assistance. Room bathroom lighting operational. Non-slip footwear when patient is off stretcher. Physically safe environment: no spills, clutter or unnecessary equipment. Stretcher in lowest position, wheels locked, appropriate side rails in place. Provide visual cue, wrist band, yellow gown, etc. Monitor gait and stability. Monitor for mental status changes and reorient to person, place, and time. Review medications for side effects contributing to fall risk. Reinforce activity limits and safety measures with patient and family. Provide visual clues: red socks.

## 2023-05-03 NOTE — H&P ADULT - PROBLEM SELECTOR PLAN 1
See above.   Patient agrees to continue with full AC as a life saving measure.  I have contacted the PE Team to evaluate the patient.   Echo.  Troponin.  BNP.    Would consider obtaining the CD of the CTT angiogram from family in the AM.

## 2023-05-03 NOTE — ED PROVIDER NOTE - PROGRESS NOTE DETAILS
Juan F Corrales PA-C: Pt endorsed to Dr. Otero. Agrees to start heparin drip at 8pm. Will admit to his service.

## 2023-05-03 NOTE — H&P ADULT - NSICDXPASTMEDICALHX_GEN_ALL_CORE_FT
PAST MEDICAL HISTORY:  Cholangiocarcinoma     Disseminated malignant neoplasm     Prostate cancer     Pulmonary embolism

## 2023-05-03 NOTE — H&P ADULT - HISTORY OF PRESENT ILLNESS
NIGHT HOSPITALIST:     Patient UNKNOWN to me previously, assigned to me at this point via the ER and by Dr. Otero to admit this 88 y/o M--[place] [facility] [place] with a history of 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 on chemo, prostate CA 13 years ago S/P cyberknife presumed to be in remission, apparent recent therapeutic paracentesis on 4/28/23 with large volume (4L) for apparent malignant ascites, with apparently poor PO intake but with dyspnoea for the past 2 weeks, unclear if presumed to be from patient's ascites, with patient undergoing a CTT angiogram lung which demonstrated multiple segmental and subsegmental B/L PE, with heart strain, peritoneal carcinomatosis and omental caking, moderate pelvic ascites, increased size of hepatic mets, and Duple with RIGHT popliteal DVT>   Patient apparently was started on Lovenox PTA and initiated on heparin gtt on arrival to the ER.    Patient presently denies chest pain/pressure.  NO dyspnoea, no palpitations.   NO hemoptysis.   NO abdominal pain, no red blood per rectum or melena.  NO back pain, no tearing back pain.  NO HA, no focal weakness.  NO dysuria, no hematuria.  + anorexia and patient refuses a diet at present.

## 2023-05-03 NOTE — H&P ADULT - NSHPADDITIONALINFOADULT_GEN_ALL_CORE
NIGHT HOSPITALIST:    Patient aware of course and agrees with plan/care as above.   Given patient's comorbidities, patient's long term prognosis is poor.   The patient is not yet ready to discuss advance directives nor goals of care.   Reviewed with covering NP/PA for endorsement to Dr. Otero.    Sebastián Rodriguez MD NIGHT HOSPITALIST:    Patient aware of course and agrees with plan/care as above.   Given patient's comorbidities, patient's long term prognosis is poor.   The patient is not yet ready to discuss advance directives nor goals of care.   Reviewed with covering NPSanto for endorsement to Dr. Otero.    Sebastián Rodriguez MD  Available on [employer] Teams.

## 2023-05-03 NOTE — ED ADULT NURSE NOTE - OBJECTIVE STATEMENT
87y m pt arrived via ems; emt reports pt tx from [facility] [place]; pt dx with multiple PE's; pt placed on lovenox; pt has bile duct ca with mets to liver; has port to r chest; arrived accessed; pt son states was accessed at [facility] [place]; placed on pump with NS at KVO; periph iv placed; pt aox3; no resp distress; pt arrived on supp O2 2L; pt c/o cordova/sob; unable to ambulate far; + bilat le edema; pt had paracentesis 4/26 4L fluid removed; dx with R popliteal dvt; no chest pain; abd firm distended; bilat le edema; son at bedside; pt denies pain

## 2023-05-03 NOTE — H&P ADULT - PROBLEM SELECTOR PLAN 3
On tamsulosin for LUTS with history of prostate CA presumed to be disease free with past cyber knife.

## 2023-05-03 NOTE — ED PROVIDER NOTE - PHYSICAL EXAMINATION
GEN: Pt chronically ill appearing in NAD, alert.  PSYCH: Affect and mood appropriate.  EYES: Sclera white w/o injection, EOMI, PERRLA.   ENT: MMM. Neck supple. Airway patent.  RESP: CTA b/l, no wheezes, rales, or rhonchi. SpO2 99 on 4LNC.  CARDIAC: Tachy 115, clear distinct S1, S2, no appreciable murmurs.  ABD: RUQ incision scar. Soft, mild distention, non-tender.  MSK: ZHU. FROM throughout.  NEURO: No focal motor or sensory deficits.  VASC: Strong distal pulses. b/l LE pitting edema.  SKIN: No rashes or lesions.

## 2023-05-03 NOTE — H&P ADULT - PROBLEM SELECTOR PLAN 4
Transitions of Care Status:  1.  Name of PCP:    Chadd Urbano MD ([facility]) [phone]  2.  PCP Contacted on Admission: [ ] Y    [x ] N    3.  PCP contacted at Discharge: [ ] Y    [ ] N    [ ] N/A  4.  Post-Discharge Appointment Date and Location:  5.  Summary of Handoff given to PCP:

## 2023-05-03 NOTE — H&P ADULT - NSHPLABSRESULTS_GEN_ALL_CORE
EKG tracing interpreted by me with sinus tachycardia at 102 with poor anterior R wave progression.    Chest radiograph interpreted by me with RIGHT trace pleural effusion, + Port.    WBC 10.3  normal differential    Hgb 10.7    Platelets of 134K    Random glucose of 103    Cr 0.9  K+ 3.9    Na+ 125    Abl 2.6    BNP 1454    RVP COVID-19 PCR>>negative.

## 2023-05-03 NOTE — ED PROVIDER NOTE - CLINICAL SUMMARY MEDICAL DECISION MAKING FREE TEXT BOX
Juan F Daniel MD. 86 yo M with stage IV cholangiocarcinoma w/ mets to liver, ascites, gets chemo through right chest wall port (last session 2 weeks ago), presents to ED for worsening CORDOVA x1 week. Pt has been having abd distention and b/l lower leg swelling too. Today, pt b/l LE duplex which showed a RIGHT popliteal DVT. Pt also had a CTA PE chest and was found to have multiple b/l PEs with CT evidence of RH strain. Pt was given  an injection of Lovenox x1 and was sent to the ED for further evaluation. Denies CP, feverse, n/v, abd pain.     pt hypoxic to 89% on RA, tachypneic with exertion (ambulated pt in the ED). mildly tachy to low 100s. maintaining BPs. awake, alert. neurologically intact, no focal deficits. A&ox3. mildy tachycardic; nl s1/s2, no m/r/g. lungs cta. abd soft, mildly distended but non tender; no rebound or guarding. b/l pitting edema 2+ of LE.     mdm: pt presenting with hypoxia, sob, cordova, with outpt CTAs today showing b/l PEs and RLE popliteal vein. already got lovenox x1 here. his PE likely from malignancy. review of CT outpt showing signs of RH strain. will need comprehensive TTE as well. will c/w AC. will require admisison. pt on 4L NC now with improvement of oxygentation. Will check CBC to eval WBC, anemia, plt count; CMP to eval for lyte abnormalities, renal and/or liver dysfunction. trop/bnp for RH strain. EKG

## 2023-05-03 NOTE — H&P ADULT - NSHPSOCIALHISTORY_GEN_ALL_CORE
NO tobacco or ethanol use.   .   Adult son and daughter aware of admission--patient did not wish for examiner to contact family at this hour. NO tobacco or ethanol use.   .   Retired shoemaker.  Adult son and daughter aware of admission--patient did not wish for examiner to contact family at this hour.

## 2023-05-03 NOTE — ED PROVIDER NOTE - OBJECTIVE STATEMENT
87y M pmhx stage IV cholangiocarcinoma w/ mets to liver, s/p 5-FU/LV (4/26/23), presents to ED as [facility] transfer due multiple b/l segmental and subsegmental pulmonary emboli, DVT study with R popliteal DVT. Pt started on Lovenox PTA. Pt has had dyspnea on exertion, b/l LE edema and abdominal distention x 1 week. Paracentesis done on 4/27. Denies f/c, cp, abd pain. 87y M pmhx stage IV cholangiocarcinoma w/ mets to liver, s/p 5-FU/LV (4/26/23), presents to ED as [facility] transfer due multiple b/l segmental and subsegmental pulmonary emboli, DVT study with R popliteal DVT. Pt started on Lovenox PTA. Pt has had dyspnea on exertion, b/l LE edema and abdominal distention x 1 week. Paracentesis done on 4/27 w/ removal of 4L. Denies f/c, cp, abd pain.

## 2023-05-03 NOTE — ED PROVIDER NOTE - ATTENDING APP SHARED VISIT CONTRIBUTION OF CARE
I, Juan F Daniel, performed a history and physical exam of the patient and discussed their management with the resident and/or advanced care provider. I reviewed the resident and/or advanced care provider's note and agree with the documented findings and plan of care except where noted. I was present and available for all procedures.    see MDM

## 2023-05-03 NOTE — H&P ADULT - NSHPREVIEWOFSYSTEMS_GEN_ALL_CORE
NO HA< no focal weakness.  NO chest pain/pressure.  No palpitations.  NO cough, no hemoptysis.  NO dyspnoea.  NO abdominal pain, no red blood per rectum or melena.  No back pain, no tearing back pain.    NO rash.  NO joint pain.  + B/L LE oedema.  NO dysuria,  NO node swelling.    Patient reports COVID-19 vaccine x 3.

## 2023-05-04 DIAGNOSIS — E87.1 HYPO-OSMOLALITY AND HYPONATREMIA: ICD-10-CM

## 2023-05-04 DIAGNOSIS — C61 MALIGNANT NEOPLASM OF PROSTATE: ICD-10-CM

## 2023-05-04 DIAGNOSIS — Z85.46 PERSONAL HISTORY OF MALIGNANT NEOPLASM OF PROSTATE: Chronic | ICD-10-CM

## 2023-05-04 DIAGNOSIS — I26.99 OTHER PULMONARY EMBOLISM WITHOUT ACUTE COR PULMONALE: ICD-10-CM

## 2023-05-04 DIAGNOSIS — Z02.9 ENCOUNTER FOR ADMINISTRATIVE EXAMINATIONS, UNSPECIFIED: ICD-10-CM

## 2023-05-04 DIAGNOSIS — C22.1 INTRAHEPATIC BILE DUCT CARCINOMA: ICD-10-CM

## 2023-05-04 LAB
A1C WITH ESTIMATED AVERAGE GLUCOSE RESULT: 5.5 % — SIGNIFICANT CHANGE UP (ref 4–5.6)
ANION GAP SERPL CALC-SCNC: 15 MMOL/L — SIGNIFICANT CHANGE UP (ref 5–17)
APPEARANCE UR: CLEAR — SIGNIFICANT CHANGE UP
APTT BLD: 64.8 SEC — HIGH (ref 27.5–35.5)
APTT BLD: 75.4 SEC — HIGH (ref 27.5–35.5)
BACTERIA # UR AUTO: NEGATIVE — SIGNIFICANT CHANGE UP
BASOPHILS # BLD AUTO: 0.03 K/UL — SIGNIFICANT CHANGE UP (ref 0–0.2)
BASOPHILS NFR BLD AUTO: 0.3 % — SIGNIFICANT CHANGE UP (ref 0–2)
BILIRUB UR-MCNC: NEGATIVE — SIGNIFICANT CHANGE UP
BUN SERPL-MCNC: 17 MG/DL — SIGNIFICANT CHANGE UP (ref 7–23)
CALCIUM SERPL-MCNC: 7.9 MG/DL — LOW (ref 8.4–10.5)
CHLORIDE SERPL-SCNC: 91 MMOL/L — LOW (ref 96–108)
CO2 SERPL-SCNC: 21 MMOL/L — LOW (ref 22–31)
COLOR SPEC: YELLOW — SIGNIFICANT CHANGE UP
CREAT SERPL-MCNC: 0.93 MG/DL — SIGNIFICANT CHANGE UP (ref 0.5–1.3)
DIFF PNL FLD: ABNORMAL
EGFR: 80 ML/MIN/1.73M2 — SIGNIFICANT CHANGE UP
EOSINOPHIL # BLD AUTO: 0.1 K/UL — SIGNIFICANT CHANGE UP (ref 0–0.5)
EOSINOPHIL NFR BLD AUTO: 1.1 % — SIGNIFICANT CHANGE UP (ref 0–6)
EPI CELLS # UR: 0 /HPF — SIGNIFICANT CHANGE UP
ESTIMATED AVERAGE GLUCOSE: 111 MG/DL — SIGNIFICANT CHANGE UP (ref 68–114)
GLUCOSE SERPL-MCNC: 97 MG/DL — SIGNIFICANT CHANGE UP (ref 70–99)
GLUCOSE UR QL: NEGATIVE — SIGNIFICANT CHANGE UP
HCT VFR BLD CALC: 32.5 % — LOW (ref 39–50)
HCT VFR BLD CALC: 33.7 % — LOW (ref 39–50)
HGB BLD-MCNC: 10.8 G/DL — LOW (ref 13–17)
HGB BLD-MCNC: 11 G/DL — LOW (ref 13–17)
HYALINE CASTS # UR AUTO: 1 /LPF — SIGNIFICANT CHANGE UP (ref 0–2)
IMM GRANULOCYTES NFR BLD AUTO: 2.2 % — HIGH (ref 0–0.9)
KETONES UR-MCNC: ABNORMAL
LEUKOCYTE ESTERASE UR-ACNC: NEGATIVE — SIGNIFICANT CHANGE UP
LYMPHOCYTES # BLD AUTO: 1.03 K/UL — SIGNIFICANT CHANGE UP (ref 1–3.3)
LYMPHOCYTES # BLD AUTO: 11.1 % — LOW (ref 13–44)
MCHC RBC-ENTMCNC: 29.6 PG — SIGNIFICANT CHANGE UP (ref 27–34)
MCHC RBC-ENTMCNC: 29.8 PG — SIGNIFICANT CHANGE UP (ref 27–34)
MCHC RBC-ENTMCNC: 32.6 GM/DL — SIGNIFICANT CHANGE UP (ref 32–36)
MCHC RBC-ENTMCNC: 33.2 GM/DL — SIGNIFICANT CHANGE UP (ref 32–36)
MCV RBC AUTO: 89.8 FL — SIGNIFICANT CHANGE UP (ref 80–100)
MCV RBC AUTO: 90.6 FL — SIGNIFICANT CHANGE UP (ref 80–100)
MONOCYTES # BLD AUTO: 0.77 K/UL — SIGNIFICANT CHANGE UP (ref 0–0.9)
MONOCYTES NFR BLD AUTO: 8.3 % — SIGNIFICANT CHANGE UP (ref 2–14)
NEUTROPHILS # BLD AUTO: 7.16 K/UL — SIGNIFICANT CHANGE UP (ref 1.8–7.4)
NEUTROPHILS NFR BLD AUTO: 77 % — SIGNIFICANT CHANGE UP (ref 43–77)
NITRITE UR-MCNC: NEGATIVE — SIGNIFICANT CHANGE UP
NRBC # BLD: 0 /100 WBCS — SIGNIFICANT CHANGE UP (ref 0–0)
NRBC # BLD: 0 /100 WBCS — SIGNIFICANT CHANGE UP (ref 0–0)
NT-PROBNP SERPL-SCNC: 1229 PG/ML — HIGH (ref 0–300)
PH UR: 6 — SIGNIFICANT CHANGE UP (ref 5–8)
PLATELET # BLD AUTO: 126 K/UL — LOW (ref 150–400)
PLATELET # BLD AUTO: 128 K/UL — LOW (ref 150–400)
POTASSIUM SERPL-MCNC: 4 MMOL/L — SIGNIFICANT CHANGE UP (ref 3.5–5.3)
POTASSIUM SERPL-SCNC: 4 MMOL/L — SIGNIFICANT CHANGE UP (ref 3.5–5.3)
PROT UR-MCNC: ABNORMAL
RBC # BLD: 3.62 M/UL — LOW (ref 4.2–5.8)
RBC # BLD: 3.72 M/UL — LOW (ref 4.2–5.8)
RBC # FLD: 17 % — HIGH (ref 10.3–14.5)
RBC # FLD: 17.2 % — HIGH (ref 10.3–14.5)
RBC CASTS # UR COMP ASSIST: 3 /HPF — SIGNIFICANT CHANGE UP (ref 0–4)
SODIUM SERPL-SCNC: 127 MMOL/L — LOW (ref 135–145)
SP GR SPEC: >1.05 (ref 1.01–1.02)
UROBILINOGEN FLD QL: NEGATIVE — SIGNIFICANT CHANGE UP
WBC # BLD: 8.86 K/UL — SIGNIFICANT CHANGE UP (ref 3.8–10.5)
WBC # BLD: 9.29 K/UL — SIGNIFICANT CHANGE UP (ref 3.8–10.5)
WBC # FLD AUTO: 8.86 K/UL — SIGNIFICANT CHANGE UP (ref 3.8–10.5)
WBC # FLD AUTO: 9.29 K/UL — SIGNIFICANT CHANGE UP (ref 3.8–10.5)
WBC UR QL: 1 /HPF — SIGNIFICANT CHANGE UP (ref 0–5)

## 2023-05-04 PROCEDURE — 99223 1ST HOSP IP/OBS HIGH 75: CPT

## 2023-05-04 PROCEDURE — 93306 TTE W/DOPPLER COMPLETE: CPT | Mod: 26

## 2023-05-04 PROCEDURE — 93970 EXTREMITY STUDY: CPT | Mod: 26

## 2023-05-04 RX ORDER — DEXTROSE 50 % IN WATER 50 %
15 SYRINGE (ML) INTRAVENOUS ONCE
Refills: 0 | Status: DISCONTINUED | OUTPATIENT
Start: 2023-05-04 | End: 2023-05-05

## 2023-05-04 RX ORDER — GLUCAGON INJECTION, SOLUTION 0.5 MG/.1ML
1 INJECTION, SOLUTION SUBCUTANEOUS ONCE
Refills: 0 | Status: DISCONTINUED | OUTPATIENT
Start: 2023-05-04 | End: 2023-05-05

## 2023-05-04 RX ORDER — DEXTROSE 50 % IN WATER 50 %
25 SYRINGE (ML) INTRAVENOUS ONCE
Refills: 0 | Status: DISCONTINUED | OUTPATIENT
Start: 2023-05-04 | End: 2023-05-05

## 2023-05-04 RX ORDER — SODIUM CHLORIDE 9 MG/ML
1000 INJECTION, SOLUTION INTRAVENOUS
Refills: 0 | Status: DISCONTINUED | OUTPATIENT
Start: 2023-05-04 | End: 2023-05-05

## 2023-05-04 RX ORDER — CALCIUM GLUCONATE 100 MG/ML
1 VIAL (ML) INTRAVENOUS ONCE
Refills: 0 | Status: COMPLETED | OUTPATIENT
Start: 2023-05-04 | End: 2023-05-04

## 2023-05-04 RX ORDER — DEXTROSE 50 % IN WATER 50 %
12.5 SYRINGE (ML) INTRAVENOUS ONCE
Refills: 0 | Status: DISCONTINUED | OUTPATIENT
Start: 2023-05-04 | End: 2023-05-05

## 2023-05-04 RX ORDER — ENOXAPARIN SODIUM 100 MG/ML
100 INJECTION SUBCUTANEOUS EVERY 24 HOURS
Refills: 0 | Status: DISCONTINUED | OUTPATIENT
Start: 2023-05-04 | End: 2023-05-08

## 2023-05-04 RX ORDER — TAMSULOSIN HYDROCHLORIDE 0.4 MG/1
0.8 CAPSULE ORAL AT BEDTIME
Refills: 0 | Status: DISCONTINUED | OUTPATIENT
Start: 2023-05-04 | End: 2023-05-05

## 2023-05-04 RX ORDER — CHLORHEXIDINE GLUCONATE 213 G/1000ML
1 SOLUTION TOPICAL DAILY
Refills: 0 | Status: DISCONTINUED | OUTPATIENT
Start: 2023-05-04 | End: 2023-05-15

## 2023-05-04 RX ADMIN — Medication 100 GRAM(S): at 17:01

## 2023-05-04 RX ADMIN — HEPARIN SODIUM 1200 UNIT(S)/HR: 5000 INJECTION INTRAVENOUS; SUBCUTANEOUS at 03:29

## 2023-05-04 RX ADMIN — HEPARIN SODIUM 1200 UNIT(S)/HR: 5000 INJECTION INTRAVENOUS; SUBCUTANEOUS at 10:34

## 2023-05-04 RX ADMIN — CHLORHEXIDINE GLUCONATE 1 APPLICATION(S): 213 SOLUTION TOPICAL at 17:15

## 2023-05-04 RX ADMIN — HEPARIN SODIUM 1200 UNIT(S)/HR: 5000 INJECTION INTRAVENOUS; SUBCUTANEOUS at 07:46

## 2023-05-04 NOTE — PATIENT PROFILE ADULT - PATIENT'S SEXUAL ORIENTATION
Update History & Physical    The Patient's History and Physical of attached was reviewed with the patient and I examined the patient. There was no change. The surgical plan was confirmed by the patient/family and me. The patient was counseled at length about the risks of veronica Covid-19 in the derrick-operative and post-operative states including the recovery window of their procedure. The patient was made aware that veronica Covid-19 after a surgical procedure may worsen their prognosis for recovering from the virus and lend to a higher morbidity and or mortality risk. The patient was given the options of postponing their procedure. All of the risks, benefits, and alternatives were discussed. The patient does   wish to proceed with the procedure. Plan:  The risk, benefits, expected outcome, and alternative to the recommended procedure have been discussed with the patient. Patient understands and wants to proceed with the procedure. Heterosexual

## 2023-05-04 NOTE — CONSULT NOTE ADULT - NS ATTEND AMEND GEN_ALL_CORE FT
Patient with stage IV cholangiocarcinoma followed at [facility] [place] with Dr. Urbano who presents for bilateral pulmonary emboli.  Concerned for right heart strain on CT scan, on IV heparin.  Await echo, plan to change to lovenox tonight.  To assess to see if he requires oxygen.   Plan for hopeful discharge in am so he can attend his grandson's wedding.

## 2023-05-04 NOTE — CONSULT NOTE ADULT - ASSESSMENT
88 y/o M--followed by his oncologist above at [facility] [place]-- patient with a history of 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 on chemo, prostate CA 13 years ago S/P cyberknife presumed to be in remission, apparent recent therapeutic paracentesis on 4/28/23 with large volume (4L) for apparent malignant ascites, with apparently poor PO intake but with dyspnoea for the past 2 weeks. He obtained CT PE that shows CT from outside hospital shows PE in multiple segmental and subsegmental bilateral pulmonary arteries     Hx prostate ca  --s/p CyberKnife 2009    Cholangiocarcinoma  --w/ metastatic liver disease   --under the care of Dr Chadd Pagan of [facility] [place]  --previously on [place]/ Cis (LD 4/12) +Durvalumab (LD 4/05),  most recently treated w/ Fluorouracil (LD 4/26)   --no systemic treatment while inpatient  --ongoing care after discharge    PE  --vascular cardiology consulted  --on heparin drip at this time  --TTE pending to evaluate for heart strain  --DVT in R popliteal vein noted on LE us done outpatient on 5/3, see above for full report     Anemia/thrombocytopenia  --mild  --likely d/t recent chemotherapy  --will monitor     will follow and coordinate care w/ [facility] [place] [facility]    Sonia Goodson NP  Hematology/ Oncology  [facility]  [phone] (office)  [phone] (alt office)  Evenings and weekends please call MD on call or office

## 2023-05-04 NOTE — CONSULT NOTE ADULT - ATTENDING COMMENTS
Patient is very concerned about missing his grandson's wedding tomorrow (friday evening)  Trop negative.   Await echo and venous duplex  Plan to transition to lovenox 1mg/kg BID this evening  Trial of ambulation off oxgyen.  If he needs oxygen this should be set up today  Will re-assess tomorrow.      D/w Daughter, she has my cell     Mikel [phone]

## 2023-05-04 NOTE — CONSULT NOTE ADULT - ASSESSMENT
86 y/o M--followed by his oncologist above at [facility] [place]-- patient with a history of 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 on chemo, prostate CA 13 years ago S/P cyberknife presumed to be in remission, apparent recent therapeutic paracentesis on 4/28/23 with large volume (4L) for apparent malignant ascites, with apparently poor PO intake but with dyspnoea for the past 2 weeks. He obtained CT PE that shows CT from outside hospital shows PE in multiple segmental and subsegmental bilateral pulmonary arteries. RV/'LV ratio is abnomal consistent with right heart strain. Trop is negative and BNP slightly elevated at 1400    #Segmental and subsegmental PE  Intermediate low risk PE. Patient is currently hemodynamically stable  -Agree with heparin drip at this time  -Please obtain TTE  -Please obtain repeat duplex of the lower extremities  -Patient's family to obtain CD of CT scan  -Please continue to trend trop, BNP and lactate    Sanchez Gan MD  Cardiology fellow 88 y/o M--followed by his oncologist above at [facility] [place]-- patient with a history of 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 on chemo, prostate CA 13 years ago S/P cyberknife presumed to be in remission, apparent recent therapeutic paracentesis on 4/28/23 with large volume (4L) for apparent malignant ascites, with apparently poor PO intake but with dyspnoea for the past 2 weeks. He obtained CT PE that shows CT from outside hospital shows PE in multiple segmental and subsegmental bilateral pulmonary arteries. RV/'LV ratio is abnomal consistent with right heart strain. Trop is negative and BNP slightly elevated at 1400    #Segmental and subsegmental PE  Intermediate low risk PE. Patient is currently hemodynamically stable. The obvious cause of the patient's PE is his malignancy.   -Agree with heparin drip at this time  -Please obtain TTE  -Please obtain repeat duplex of the lower extremities  -Patient's family to obtain CD of CT scan  -Please continue to trend trop, BNP and lactate    Sanchez Gan MD  Cardiology fellow

## 2023-05-04 NOTE — PATIENT PROFILE ADULT - FALL HARM RISK - HARM RISK INTERVENTIONS
Assistance with ambulation/Assistance OOB with selected safe patient handling equipment/Communicate Risk of Fall with Harm to all staff/Discuss with provider need for PT consult/Monitor gait and stability/Provide patient with walking aids - walker, cane, crutches/Reinforce activity limits and safety measures with patient and family/Tailored Fall Risk Interventions/Use of alarms - bed, chair and/or voice tab/Visual Cue: Yellow wristband and red socks/Bed in lowest position, wheels locked, appropriate side rails in place/Call bell, personal items and telephone in reach/Instruct patient to call for assistance before getting out of bed or chair/Non-slip footwear when patient is out of bed/[place] to call system/Physically safe environment - no spills, clutter or unnecessary equipment/Purposeful Proactive Rounding/Room/bathroom lighting operational, light cord in reach

## 2023-05-05 LAB
ANION GAP SERPL CALC-SCNC: 17 MMOL/L — SIGNIFICANT CHANGE UP (ref 5–17)
BASOPHILS # BLD AUTO: 0.03 K/UL — SIGNIFICANT CHANGE UP (ref 0–0.2)
BASOPHILS NFR BLD AUTO: 0.3 % — SIGNIFICANT CHANGE UP (ref 0–2)
BUN SERPL-MCNC: 18 MG/DL — SIGNIFICANT CHANGE UP (ref 7–23)
CALCIUM SERPL-MCNC: 8.6 MG/DL — SIGNIFICANT CHANGE UP (ref 8.4–10.5)
CHLORIDE SERPL-SCNC: 91 MMOL/L — LOW (ref 96–108)
CO2 SERPL-SCNC: 20 MMOL/L — LOW (ref 22–31)
CREAT SERPL-MCNC: 0.98 MG/DL — SIGNIFICANT CHANGE UP (ref 0.5–1.3)
EGFR: 76 ML/MIN/1.73M2 — SIGNIFICANT CHANGE UP
EOSINOPHIL # BLD AUTO: 0.1 K/UL — SIGNIFICANT CHANGE UP (ref 0–0.5)
EOSINOPHIL NFR BLD AUTO: 0.9 % — SIGNIFICANT CHANGE UP (ref 0–6)
GLUCOSE SERPL-MCNC: 189 MG/DL — HIGH (ref 70–99)
HCT VFR BLD CALC: 37.3 % — LOW (ref 39–50)
HGB BLD-MCNC: 12.1 G/DL — LOW (ref 13–17)
IMM GRANULOCYTES NFR BLD AUTO: 1.7 % — HIGH (ref 0–0.9)
LDH SERPL L TO P-CCNC: 243 U/L — HIGH (ref 50–242)
LYMPHOCYTES # BLD AUTO: 1.43 K/UL — SIGNIFICANT CHANGE UP (ref 1–3.3)
LYMPHOCYTES # BLD AUTO: 13.2 % — SIGNIFICANT CHANGE UP (ref 13–44)
MCHC RBC-ENTMCNC: 29.4 PG — SIGNIFICANT CHANGE UP (ref 27–34)
MCHC RBC-ENTMCNC: 32.4 GM/DL — SIGNIFICANT CHANGE UP (ref 32–36)
MCV RBC AUTO: 90.5 FL — SIGNIFICANT CHANGE UP (ref 80–100)
MONOCYTES # BLD AUTO: 0.96 K/UL — HIGH (ref 0–0.9)
MONOCYTES NFR BLD AUTO: 8.9 % — SIGNIFICANT CHANGE UP (ref 2–14)
NEUTROPHILS # BLD AUTO: 8.12 K/UL — HIGH (ref 1.8–7.4)
NEUTROPHILS NFR BLD AUTO: 75 % — SIGNIFICANT CHANGE UP (ref 43–77)
NRBC # BLD: 0 /100 WBCS — SIGNIFICANT CHANGE UP (ref 0–0)
NT-PROBNP SERPL-SCNC: 1036 PG/ML — HIGH (ref 0–300)
PLATELET # BLD AUTO: 195 K/UL — SIGNIFICANT CHANGE UP (ref 150–400)
POTASSIUM SERPL-MCNC: 4.1 MMOL/L — SIGNIFICANT CHANGE UP (ref 3.5–5.3)
POTASSIUM SERPL-SCNC: 4.1 MMOL/L — SIGNIFICANT CHANGE UP (ref 3.5–5.3)
RBC # BLD: 4.12 M/UL — LOW (ref 4.2–5.8)
RBC # FLD: 17.5 % — HIGH (ref 10.3–14.5)
SODIUM SERPL-SCNC: 128 MMOL/L — LOW (ref 135–145)
WBC # BLD: 10.82 K/UL — HIGH (ref 3.8–10.5)
WBC # FLD AUTO: 10.82 K/UL — HIGH (ref 3.8–10.5)

## 2023-05-05 PROCEDURE — 99233 SBSQ HOSP IP/OBS HIGH 50: CPT

## 2023-05-05 RX ORDER — TAMSULOSIN HYDROCHLORIDE 0.4 MG/1
0.4 CAPSULE ORAL DAILY
Refills: 0 | Status: DISCONTINUED | OUTPATIENT
Start: 2023-05-05 | End: 2023-05-15

## 2023-05-05 RX ADMIN — CHLORHEXIDINE GLUCONATE 1 APPLICATION(S): 213 SOLUTION TOPICAL at 11:43

## 2023-05-05 RX ADMIN — ENOXAPARIN SODIUM 100 MILLIGRAM(S): 100 INJECTION SUBCUTANEOUS at 06:36

## 2023-05-05 NOTE — PHYSICAL THERAPY INITIAL EVALUATION ADULT - ADDITIONAL COMMENTS
Prior to admission pt was independent without AD. Pt lives alone in pvt house with 9 steps/rail to enter and 7 steps/rail inside. Family states they can be with patient upon d/c.

## 2023-05-05 NOTE — PHYSICAL THERAPY INITIAL EVALUATION ADULT - NSPTDMEREC_GEN_A_CORE
rolling walker Pt will require rolling walker for discharge due to unsteady gait and decreased gait endurance./rolling walker

## 2023-05-05 NOTE — DIETITIAN INITIAL EVALUATION ADULT - PERTINENT LABORATORY DATA
05-04    127<L>  |  91<L>  |  17  ----------------------------<  97  4.0   |  21<L>  |  0.93    Ca    7.9<L>      04 May 2023 06:12    TPro  5.2<L>  /  Alb  2.6<L>  /  TBili  0.6  /  DBili  x   /  AST  27  /  ALT  16  /  AlkPhos  96  05-03  POCT Blood Glucose.: 90 mg/dL (05-04-23 @ 11:22)  A1C with Estimated Average Glucose Result: 5.5 % (05-04-23 @ 03:03)

## 2023-05-05 NOTE — PHYSICAL THERAPY INITIAL EVALUATION ADULT - PERTINENT HX OF CURRENT PROBLEM, REHAB EVAL
88 y/o M--followed by his oncologist above at [facility] [place]-- patient with a history of 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 on chemo, prostate CA 13 years ago S/P cyberknife presumed to be in remission, apparent recent therapeutic paracentesis on 4/28/23 with large volume (4L) for apparent malignant ascites, with apparently poor PO intake but with dyspnoea for the past 2 weeks. He obtained CT PE that shows CT from outside hospital shows PE in multiple segmental and subsegmental bilateral pulmonary arteries. Hx prostate ca--s/p CyberKnife 2009, Cholangiocarcinoma --w/ metastatic liver disease, PE, DVT in R popliteal vein noted on LE us done outpatient on 5/3, Anemia/thrombocytopenia

## 2023-05-05 NOTE — DIETITIAN INITIAL EVALUATION ADULT - SIGNS/SYMPTOMS
PO intake </=75% x >/=1 week, mild to moderate body fat and muscle mass depletion, 1+ edema pt with dx of peritoneal carcinomatosis, hepatic mets

## 2023-05-05 NOTE — DIETITIAN INITIAL EVALUATION ADULT - PROBLEM SELECTOR PLAN 4
Transitions of Care Status:  1.  Name of PCP:    Chadd Urbano MD ([facility]) [phone]  2.  PCP Contacted on Admission: [ ] Y    [x ] N    3.  PCP contacted at Discharge: [ ] Y    [ ] N    [ ] N/A  4.  Post-Discharge Appointment Date and Location:  5.  Summary of Handoff given to PCP:

## 2023-05-05 NOTE — DIETITIAN INITIAL EVALUATION ADULT - ETIOLOGY
presumed inadequate energy intake in setting of decreased appetite related to malignant ascites, peritoneal carcinomatosis, hepatic mets increased physiological demand of nutrient in setting of catabolic illness

## 2023-05-05 NOTE — DIETITIAN INITIAL EVALUATION ADULT - REASON FOR ADMISSION
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 Presumed to be in remission. Apparent recent therapeutic paracentesis 4/28 with large volume (4L) for apparent malignant ascites, with poor PO intake and dyspnoea for the past two weeks. Underwent a CTT angiogram of lung which demonstrated multiple segmental and subsegmental B/L PE, with heart strain, peritoneal carcinomatosis and omental caking, moderate pelvic ascites, increased size of hepatic mets, and Duple with R popliteal DVT. Vascular team following. See MD note 5/4 for additional details.

## 2023-05-05 NOTE — DIETITIAN INITIAL EVALUATION ADULT - ADD RECOMMEND
1. Recommend diet consistency change to soft/chopped as per pt preference. Defer consistency to medical team, SLP.  2. RD to add Mighty Shakes three times daily with meals.   3. Recommend multivitamin (if no medication contraindications) to aid in prevention of micronutrient deficiencies.   4. Encourage and monitor PO intake. Encourage use of daily menus. Honor dietary preferences as expressed as able.  5. Monitor wt trends/labs/skin integrity/hydration status/bowel regularity.

## 2023-05-05 NOTE — DIETITIAN INITIAL EVALUATION ADULT - PERTINENT MEDS FT
MEDICATIONS  (STANDING):  chlorhexidine 2% Cloths 1 Application(s) Topical daily  dextrose 5%. 1000 milliLiter(s) (50 mL/Hr) IV Continuous <Continuous>  dextrose 5%. 1000 milliLiter(s) (100 mL/Hr) IV Continuous <Continuous>  dextrose 50% Injectable 12.5 Gram(s) IV Push once  dextrose 50% Injectable 25 Gram(s) IV Push once  dextrose 50% Injectable 25 Gram(s) IV Push once  enoxaparin Injectable 100 milliGRAM(s) SubCutaneous every 24 hours  glucagon  Injectable 1 milliGRAM(s) IntraMuscular once  tamsulosin 0.4 milliGRAM(s) Oral daily    MEDICATIONS  (PRN):  dextrose Oral Gel 15 Gram(s) Oral once PRN Blood Glucose LESS THAN 70 milliGRAM(s)/deciliter

## 2023-05-05 NOTE — DIETITIAN INITIAL EVALUATION ADULT - OTHER INFO
Dosing wt (5/4): 150.9 lbs   Dosing wt (5/4): 150.9 lbs  Pt reports UBW ~149-150 lbs. Appears stable. Will continue to monitor/trend.

## 2023-05-05 NOTE — DIETITIAN INITIAL EVALUATION ADULT - PHYSICAL ASSESSMENT FAT OVERLYING RIBCAGE
Quinolones Pregnancy And Lactation Text: This medication is Pregnancy Category C and it isn't know if it is safe during pregnancy. It is also excreted in breast milk. mild

## 2023-05-05 NOTE — PHYSICAL THERAPY INITIAL EVALUATION ADULT - GENERAL OBSERVATIONS, REHAB EVAL
Pt received seated EOB in NAD, +IV Lock, room air. Pt AOx4, pleasant and cooperative. Family present.

## 2023-05-05 NOTE — DIETITIAN INITIAL EVALUATION ADULT - ORAL INTAKE PTA/DIET HISTORY
-Daughter present at bedside. Per pt, reports poor to fair PO intake x 2 weeks PTA in setting of abdominal distension/discomfort/bloating. To note, pt s/p paracentesis 4/28. Endorsed enjoyment of most foods, including fish, chicken, pasta, legumes. Prefers soft foods (soup) at this time.   -Denies food allergies. Denies intolerance to chewing/swallowing, however endorsed sometimes feeling like "things get stuck" in chest area after eating with occasional cough.   -Denies intake of vitamins/supplements PTA. Reports hx of cooking own meals.

## 2023-05-05 NOTE — PROGRESS NOTE ADULT - ASSESSMENT
86 y/o M--followed by his oncologist above at [facility] [place]-- patient with a history of 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 on chemo, prostate CA 13 years ago S/P cyberknife presumed to be in remission, apparent recent therapeutic paracentesis on 4/28/23 with large volume (4L) for apparent malignant ascites, with apparently poor PO intake but with dyspnoea for the past 2 weeks. He obtained CT PE that shows CT from outside hospital shows PE in multiple segmental and subsegmental bilateral pulmonary arteries     Hx prostate ca  --s/p CyberKnife 2009    Cholangiocarcinoma  --w/ metastatic liver disease   --under the care of Dr Chadd Pagan of [facility] [place]  --previously on [place]/ Cis (LD 4/12) +Durvalumab (LD 4/05),  most recently treated w/ Fluorouracil (LD 4/26)   --no systemic treatment while inpatient  --ongoing care after discharge    PE  --vascular cardiology consulted  --was on heparin now on Lovenox   --s/p TRAVON (see above)  --DVT in R popliteal vein noted on LE us done outpatient on 5/3, see above for full report     Anemia/thrombocytopenia  --mild  --likely d/t recent chemotherapy  --will monitor     will follow and coordinate care w/ [facility] [place]    Sonia Goodson NP  Hematology/ Oncology  [facility]  [phone] (office)  [phone] (alt office)  Evenings and weekends please call MD on call or office

## 2023-05-06 LAB
ANION GAP SERPL CALC-SCNC: 13 MMOL/L — SIGNIFICANT CHANGE UP (ref 5–17)
BUN SERPL-MCNC: 22 MG/DL — SIGNIFICANT CHANGE UP (ref 7–23)
CALCIUM SERPL-MCNC: 8.5 MG/DL — SIGNIFICANT CHANGE UP (ref 8.4–10.5)
CHLORIDE SERPL-SCNC: 92 MMOL/L — LOW (ref 96–108)
CO2 SERPL-SCNC: 22 MMOL/L — SIGNIFICANT CHANGE UP (ref 22–31)
CREAT SERPL-MCNC: 0.99 MG/DL — SIGNIFICANT CHANGE UP (ref 0.5–1.3)
EGFR: 75 ML/MIN/1.73M2 — SIGNIFICANT CHANGE UP
GLUCOSE SERPL-MCNC: 118 MG/DL — HIGH (ref 70–99)
HCT VFR BLD CALC: 34.7 % — LOW (ref 39–50)
HGB BLD-MCNC: 11.6 G/DL — LOW (ref 13–17)
MCHC RBC-ENTMCNC: 30 PG — SIGNIFICANT CHANGE UP (ref 27–34)
MCHC RBC-ENTMCNC: 33.4 GM/DL — SIGNIFICANT CHANGE UP (ref 32–36)
MCV RBC AUTO: 89.7 FL — SIGNIFICANT CHANGE UP (ref 80–100)
NRBC # BLD: 0 /100 WBCS — SIGNIFICANT CHANGE UP (ref 0–0)
PLATELET # BLD AUTO: 185 K/UL — SIGNIFICANT CHANGE UP (ref 150–400)
POTASSIUM SERPL-MCNC: 4.2 MMOL/L — SIGNIFICANT CHANGE UP (ref 3.5–5.3)
POTASSIUM SERPL-SCNC: 4.2 MMOL/L — SIGNIFICANT CHANGE UP (ref 3.5–5.3)
RBC # BLD: 3.87 M/UL — LOW (ref 4.2–5.8)
RBC # FLD: 17.4 % — HIGH (ref 10.3–14.5)
SODIUM SERPL-SCNC: 127 MMOL/L — LOW (ref 135–145)
WBC # BLD: 11.15 K/UL — HIGH (ref 3.8–10.5)
WBC # FLD AUTO: 11.15 K/UL — HIGH (ref 3.8–10.5)

## 2023-05-06 RX ADMIN — TAMSULOSIN HYDROCHLORIDE 0.4 MILLIGRAM(S): 0.4 CAPSULE ORAL at 12:03

## 2023-05-06 RX ADMIN — ENOXAPARIN SODIUM 100 MILLIGRAM(S): 100 INJECTION SUBCUTANEOUS at 05:53

## 2023-05-06 RX ADMIN — CHLORHEXIDINE GLUCONATE 1 APPLICATION(S): 213 SOLUTION TOPICAL at 12:02

## 2023-05-06 NOTE — CONSULT NOTE ADULT - ASSESSMENT
88 y/o M--followed by his oncologist above at [facility] [place]-- patient with a history of 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 on chemo, prostate CA 13 years ago S/P cyberknife presumed to be in remission, apparent recent therapeutic paracentesis on 4/28/23 with large volume (4L) for apparent malignant ascites, with apparently poor PO intake but with dyspnoea for the past 2 weeks, with patient undergoing a CTT angiogram lung which demonstrated multiple segmental and subsegmental B/L PE, with heart strain, peritoneal carcinomatosis and omental caking, moderate pelvic ascites, increased size of hepatic mets, and Duple with RIGHT popliteal DVT>   Patient apparently was started on Lovenox PTA and initiated on heparin gtt on arrival to the ER.   + anorexia  as documented. pt also noticed with hyponatremia    1- hyponatremia   2- pulm embolism   3- advance cholangiocarcinoma   4- bilateral dvt    suspect hyponatremia in setting of volume overload  to have urine na k cl urine osoms and serum osoms  will attempt Ure-na once urine lytes known   anticoagulation for pulm embolism

## 2023-05-06 NOTE — PROGRESS NOTE ADULT - ASSESSMENT
1. Stage IV Cholangiocarcinoma  -- Known liver mets    --previously on [place]/ Cis (LD 4/12) +Durvalumab (LD 4/05),  most recently treated w/ Fluorouracil (LD 4/26)   --no systemic treatment while inpatient  --ongoing care w Dr. Urbano at [facility] [place]  after discharge    2. PE    -- cont Lovenox 1mg/kg BID  --was on heparin now on Lovenox   --DVT in R popliteal vein noted on LE us done outpatient on 5/3    3, Anemia/thrombocytopenia    -- H/H stable  --likely d/t recent chemotherapy      No objection from Onc to d/c    Jassi Antoine MD

## 2023-05-07 LAB
ALBUMIN SERPL ELPH-MCNC: 2.4 G/DL — LOW (ref 3.3–5)
ALP SERPL-CCNC: 96 U/L — SIGNIFICANT CHANGE UP (ref 40–120)
ALT FLD-CCNC: 11 U/L — SIGNIFICANT CHANGE UP (ref 10–45)
ANION GAP SERPL CALC-SCNC: 10 MMOL/L — SIGNIFICANT CHANGE UP (ref 5–17)
AST SERPL-CCNC: 18 U/L — SIGNIFICANT CHANGE UP (ref 10–40)
BILIRUB SERPL-MCNC: 0.5 MG/DL — SIGNIFICANT CHANGE UP (ref 0.2–1.2)
BUN SERPL-MCNC: 21 MG/DL — SIGNIFICANT CHANGE UP (ref 7–23)
CALCIUM SERPL-MCNC: 8.1 MG/DL — LOW (ref 8.4–10.5)
CHLORIDE SERPL-SCNC: 92 MMOL/L — LOW (ref 96–108)
CO2 SERPL-SCNC: 24 MMOL/L — SIGNIFICANT CHANGE UP (ref 22–31)
CREAT SERPL-MCNC: 0.92 MG/DL — SIGNIFICANT CHANGE UP (ref 0.5–1.3)
EGFR: 82 ML/MIN/1.73M2 — SIGNIFICANT CHANGE UP
GLUCOSE SERPL-MCNC: 104 MG/DL — HIGH (ref 70–99)
HCT VFR BLD CALC: 31.9 % — LOW (ref 39–50)
HGB BLD-MCNC: 10.3 G/DL — LOW (ref 13–17)
MCHC RBC-ENTMCNC: 29.2 PG — SIGNIFICANT CHANGE UP (ref 27–34)
MCHC RBC-ENTMCNC: 32.3 GM/DL — SIGNIFICANT CHANGE UP (ref 32–36)
MCV RBC AUTO: 90.4 FL — SIGNIFICANT CHANGE UP (ref 80–100)
NRBC # BLD: 0 /100 WBCS — SIGNIFICANT CHANGE UP (ref 0–0)
OSMOLALITY SERPL: 266 MOSMOL/KG — LOW (ref 280–301)
OSMOLALITY UR: 877 MOS/KG — SIGNIFICANT CHANGE UP (ref 300–900)
PLATELET # BLD AUTO: 166 K/UL — SIGNIFICANT CHANGE UP (ref 150–400)
POTASSIUM SERPL-MCNC: 3.8 MMOL/L — SIGNIFICANT CHANGE UP (ref 3.5–5.3)
POTASSIUM SERPL-SCNC: 3.8 MMOL/L — SIGNIFICANT CHANGE UP (ref 3.5–5.3)
POTASSIUM UR-SCNC: 63 MMOL/L — SIGNIFICANT CHANGE UP
PROT SERPL-MCNC: 4.8 G/DL — LOW (ref 6–8.3)
RBC # BLD: 3.53 M/UL — LOW (ref 4.2–5.8)
RBC # FLD: 17.5 % — HIGH (ref 10.3–14.5)
SODIUM SERPL-SCNC: 126 MMOL/L — LOW (ref 135–145)
SODIUM UR-SCNC: 176 MMOL/L — SIGNIFICANT CHANGE UP
URATE SERPL-MCNC: 6.4 MG/DL — SIGNIFICANT CHANGE UP (ref 3.4–8.8)
WBC # BLD: 9.3 K/UL — SIGNIFICANT CHANGE UP (ref 3.8–10.5)
WBC # FLD AUTO: 9.3 K/UL — SIGNIFICANT CHANGE UP (ref 3.8–10.5)

## 2023-05-07 RX ADMIN — CHLORHEXIDINE GLUCONATE 1 APPLICATION(S): 213 SOLUTION TOPICAL at 11:16

## 2023-05-07 RX ADMIN — ENOXAPARIN SODIUM 100 MILLIGRAM(S): 100 INJECTION SUBCUTANEOUS at 06:05

## 2023-05-07 RX ADMIN — TAMSULOSIN HYDROCHLORIDE 0.4 MILLIGRAM(S): 0.4 CAPSULE ORAL at 11:14

## 2023-05-07 NOTE — PROGRESS NOTE ADULT - ASSESSMENT
1. Stage IV Cholangiocarcinoma    -- Known liver mets    --previously on [place]/ Cis (LD 4/12) +Durvalumab (LD 4/05),  most recently treated w/ Fluorouracil (LD 4/26)   --no systemic treatment while inpatient  --ongoing care w [facility]. [facility]  after discharge    2. PE    -- cont Lovenox    --DVT in R popliteal vein noted on LE us done outpatient on 5/3    3, Anemia/thrombocytopenia    -- H/H stable  --likely d/t recent chemotherapy    4. Hyponatremia    -- seen by renal, suspect volume overload  -- w/u per nephrology underway    Jassi Antoine MD

## 2023-05-08 LAB
ANION GAP SERPL CALC-SCNC: 10 MMOL/L — SIGNIFICANT CHANGE UP (ref 5–17)
ANION GAP SERPL CALC-SCNC: 12 MMOL/L — SIGNIFICANT CHANGE UP (ref 5–17)
ANION GAP SERPL CALC-SCNC: 15 MMOL/L — SIGNIFICANT CHANGE UP (ref 5–17)
ANION GAP SERPL CALC-SCNC: 9 MMOL/L — SIGNIFICANT CHANGE UP (ref 5–17)
BUN SERPL-MCNC: 21 MG/DL — SIGNIFICANT CHANGE UP (ref 7–23)
BUN SERPL-MCNC: 22 MG/DL — SIGNIFICANT CHANGE UP (ref 7–23)
BUN SERPL-MCNC: 44 MG/DL — HIGH (ref 7–23)
BUN SERPL-MCNC: 47 MG/DL — HIGH (ref 7–23)
CALCIUM SERPL-MCNC: 7.8 MG/DL — LOW (ref 8.4–10.5)
CALCIUM SERPL-MCNC: 7.9 MG/DL — LOW (ref 8.4–10.5)
CALCIUM SERPL-MCNC: 8.1 MG/DL — LOW (ref 8.4–10.5)
CALCIUM SERPL-MCNC: 8.1 MG/DL — LOW (ref 8.4–10.5)
CHLORIDE SERPL-SCNC: 89 MMOL/L — LOW (ref 96–108)
CHLORIDE SERPL-SCNC: 90 MMOL/L — LOW (ref 96–108)
CHLORIDE SERPL-SCNC: 91 MMOL/L — LOW (ref 96–108)
CHLORIDE SERPL-SCNC: 92 MMOL/L — LOW (ref 96–108)
CO2 SERPL-SCNC: 21 MMOL/L — LOW (ref 22–31)
CO2 SERPL-SCNC: 22 MMOL/L — SIGNIFICANT CHANGE UP (ref 22–31)
CO2 SERPL-SCNC: 22 MMOL/L — SIGNIFICANT CHANGE UP (ref 22–31)
CO2 SERPL-SCNC: 23 MMOL/L — SIGNIFICANT CHANGE UP (ref 22–31)
CREAT SERPL-MCNC: 0.74 MG/DL — SIGNIFICANT CHANGE UP (ref 0.5–1.3)
CREAT SERPL-MCNC: 0.89 MG/DL — SIGNIFICANT CHANGE UP (ref 0.5–1.3)
CREAT SERPL-MCNC: 0.92 MG/DL — SIGNIFICANT CHANGE UP (ref 0.5–1.3)
CREAT SERPL-MCNC: 0.96 MG/DL — SIGNIFICANT CHANGE UP (ref 0.5–1.3)
EGFR: 77 ML/MIN/1.73M2 — SIGNIFICANT CHANGE UP
EGFR: 82 ML/MIN/1.73M2 — SIGNIFICANT CHANGE UP
EGFR: 84 ML/MIN/1.73M2 — SIGNIFICANT CHANGE UP
EGFR: 89 ML/MIN/1.73M2 — SIGNIFICANT CHANGE UP
GLUCOSE SERPL-MCNC: 118 MG/DL — HIGH (ref 70–99)
GLUCOSE SERPL-MCNC: 120 MG/DL — HIGH (ref 70–99)
GLUCOSE SERPL-MCNC: 123 MG/DL — HIGH (ref 70–99)
GLUCOSE SERPL-MCNC: 151 MG/DL — HIGH (ref 70–99)
POTASSIUM SERPL-MCNC: 4 MMOL/L — SIGNIFICANT CHANGE UP (ref 3.5–5.3)
POTASSIUM SERPL-MCNC: 4.1 MMOL/L — SIGNIFICANT CHANGE UP (ref 3.5–5.3)
POTASSIUM SERPL-MCNC: 4.3 MMOL/L — SIGNIFICANT CHANGE UP (ref 3.5–5.3)
POTASSIUM SERPL-MCNC: 6.4 MMOL/L — CRITICAL HIGH (ref 3.5–5.3)
POTASSIUM SERPL-SCNC: 4 MMOL/L — SIGNIFICANT CHANGE UP (ref 3.5–5.3)
POTASSIUM SERPL-SCNC: 4.1 MMOL/L — SIGNIFICANT CHANGE UP (ref 3.5–5.3)
POTASSIUM SERPL-SCNC: 4.3 MMOL/L — SIGNIFICANT CHANGE UP (ref 3.5–5.3)
POTASSIUM SERPL-SCNC: 6.4 MMOL/L — CRITICAL HIGH (ref 3.5–5.3)
SODIUM SERPL-SCNC: 122 MMOL/L — LOW (ref 135–145)
SODIUM SERPL-SCNC: 123 MMOL/L — LOW (ref 135–145)
SODIUM SERPL-SCNC: 125 MMOL/L — LOW (ref 135–145)
SODIUM SERPL-SCNC: 126 MMOL/L — LOW (ref 135–145)

## 2023-05-08 PROCEDURE — 76705 ECHO EXAM OF ABDOMEN: CPT | Mod: 26

## 2023-05-08 PROCEDURE — 99233 SBSQ HOSP IP/OBS HIGH 50: CPT

## 2023-05-08 RX ORDER — UREA 15 G
15 POWDER IN PACKET (EA) ORAL
Refills: 0 | Status: COMPLETED | OUTPATIENT
Start: 2023-05-08 | End: 2023-05-08

## 2023-05-08 RX ORDER — POLYETHYLENE GLYCOL 3350 17 G/17G
17 POWDER, FOR SOLUTION ORAL ONCE
Refills: 0 | Status: COMPLETED | OUTPATIENT
Start: 2023-05-08 | End: 2023-05-08

## 2023-05-08 RX ORDER — ENOXAPARIN SODIUM 100 MG/ML
100 INJECTION SUBCUTANEOUS
Qty: 30 | Refills: 0
Start: 2023-05-08 | End: 2023-06-06

## 2023-05-08 RX ADMIN — Medication 15 GRAM(S): at 18:34

## 2023-05-08 RX ADMIN — CHLORHEXIDINE GLUCONATE 1 APPLICATION(S): 213 SOLUTION TOPICAL at 12:02

## 2023-05-08 RX ADMIN — TAMSULOSIN HYDROCHLORIDE 0.4 MILLIGRAM(S): 0.4 CAPSULE ORAL at 11:57

## 2023-05-08 RX ADMIN — POLYETHYLENE GLYCOL 3350 17 GRAM(S): 17 POWDER, FOR SOLUTION ORAL at 12:51

## 2023-05-08 RX ADMIN — Medication 15 GRAM(S): at 14:45

## 2023-05-08 RX ADMIN — ENOXAPARIN SODIUM 100 MILLIGRAM(S): 100 INJECTION SUBCUTANEOUS at 06:41

## 2023-05-08 NOTE — PROGRESS NOTE ADULT - ASSESSMENT
1. Stage IV Cholangiocarcinoma    -- Known liver mets    --previously on [place]/ Cis (LD 4/12) +Durvalumab (LD 4/05),  most recently treated w/ Fluorouracil (LD 4/26)   --not a candidate for further treatment as per Dr Pagan  --[facility] discussions initiated with family  --ongoing care afetr discharge    2. PE    -- cont Lovenox    --DVT in R popliteal vein noted on LE us done outpatient on 5/3    3, Anemia/thrombocytopenia    -- H/H stable  --likely d/t recent chemotherapy    4. Hyponatremia    --ongoing, Na 126  --seen by renal, suspect volume overload  --w/u per nephrology underway    Sonia Goodson NP  Hematology/ Oncology  [facility]  [phone] (office)  [phone] (alt office)  Evenings and weekends please call MD on call or office

## 2023-05-08 NOTE — PROGRESS NOTE ADULT - ASSESSMENT
88 y/o M--[place] [facility] [place] with a history of 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 on chemo, prostate CA 13 years ago S/P cyberknife presumed to be in remission, apparent recent therapeutic paracentesis on 4/28/23 with large volume (4L) for apparent malignant ascites, with apparently poor PO intake but with dyspnoea for the past 2 weeks, with patient undergoing a CTT angiogram lung which demonstrated multiple segmental and subsegmental B/L PE, with heart strain, peritoneal carcinomatosis and omental caking, moderate pelvic ascites, increased size of hepatic mets, and Duple with RIGHT popliteal DVT>   Patient apparently was started on Lovenox PTA and initiated on heparin gtt on arrival to the ER.   + anorexia  as documented. pt also noticed with hyponatremia      1- hyponatremia   2- pulm embolism   3- advance cholangiocarcinoma   4- bilateral dvt        suspect hyponatremia in setting of volume overload, high urine osmo, indicating SIADH as well.  1L fluid restriction.   Ure-Na 15G po q6h x 2 doses.   anticoagulation for pulm embolism   trend sodium

## 2023-05-09 LAB
ALBUMIN FLD-MCNC: 2 G/DL — SIGNIFICANT CHANGE UP
ANION GAP SERPL CALC-SCNC: 12 MMOL/L — SIGNIFICANT CHANGE UP (ref 5–17)
ANION GAP SERPL CALC-SCNC: 14 MMOL/L — SIGNIFICANT CHANGE UP (ref 5–17)
ANION GAP SERPL CALC-SCNC: 9 MMOL/L — SIGNIFICANT CHANGE UP (ref 5–17)
B PERT IGG+IGM PNL SER: ABNORMAL
BUN SERPL-MCNC: 32 MG/DL — HIGH (ref 7–23)
BUN SERPL-MCNC: 38 MG/DL — HIGH (ref 7–23)
BUN SERPL-MCNC: 41 MG/DL — HIGH (ref 7–23)
CALCIUM SERPL-MCNC: 7.7 MG/DL — LOW (ref 8.4–10.5)
CALCIUM SERPL-MCNC: 8.3 MG/DL — LOW (ref 8.4–10.5)
CALCIUM SERPL-MCNC: 8.6 MG/DL — SIGNIFICANT CHANGE UP (ref 8.4–10.5)
CHLORIDE SERPL-SCNC: 90 MMOL/L — LOW (ref 96–108)
CHLORIDE SERPL-SCNC: 90 MMOL/L — LOW (ref 96–108)
CHLORIDE SERPL-SCNC: 94 MMOL/L — LOW (ref 96–108)
CO2 SERPL-SCNC: 22 MMOL/L — SIGNIFICANT CHANGE UP (ref 22–31)
CO2 SERPL-SCNC: 23 MMOL/L — SIGNIFICANT CHANGE UP (ref 22–31)
CO2 SERPL-SCNC: 24 MMOL/L — SIGNIFICANT CHANGE UP (ref 22–31)
COLOR FLD: YELLOW — SIGNIFICANT CHANGE UP
CREAT SERPL-MCNC: 0.88 MG/DL — SIGNIFICANT CHANGE UP (ref 0.5–1.3)
CREAT SERPL-MCNC: 0.94 MG/DL — SIGNIFICANT CHANGE UP (ref 0.5–1.3)
CREAT SERPL-MCNC: 0.95 MG/DL — SIGNIFICANT CHANGE UP (ref 0.5–1.3)
EGFR: 78 ML/MIN/1.73M2 — SIGNIFICANT CHANGE UP
EGFR: 79 ML/MIN/1.73M2 — SIGNIFICANT CHANGE UP
EGFR: 84 ML/MIN/1.73M2 — SIGNIFICANT CHANGE UP
FLUID INTAKE SUBSTANCE CLASS: SIGNIFICANT CHANGE UP
GLUCOSE FLD-MCNC: 89 MG/DL — SIGNIFICANT CHANGE UP
GLUCOSE SERPL-MCNC: 116 MG/DL — HIGH (ref 70–99)
GLUCOSE SERPL-MCNC: 147 MG/DL — HIGH (ref 70–99)
GLUCOSE SERPL-MCNC: 97 MG/DL — SIGNIFICANT CHANGE UP (ref 70–99)
LDH SERPL L TO P-CCNC: 666 U/L — SIGNIFICANT CHANGE UP
LYMPHOCYTES # FLD: 22 % — SIGNIFICANT CHANGE UP
MONOS+MACROS # FLD: 63 % — SIGNIFICANT CHANGE UP
MRSA PCR RESULT.: SIGNIFICANT CHANGE UP
NEUTROPHILS-BODY FLUID: 6 % — SIGNIFICANT CHANGE UP
OTHER CELLS FLD MANUAL: 9 % — SIGNIFICANT CHANGE UP
POTASSIUM SERPL-MCNC: 4.1 MMOL/L — SIGNIFICANT CHANGE UP (ref 3.5–5.3)
POTASSIUM SERPL-MCNC: 4.1 MMOL/L — SIGNIFICANT CHANGE UP (ref 3.5–5.3)
POTASSIUM SERPL-MCNC: 4.4 MMOL/L — SIGNIFICANT CHANGE UP (ref 3.5–5.3)
POTASSIUM SERPL-SCNC: 4.1 MMOL/L — SIGNIFICANT CHANGE UP (ref 3.5–5.3)
POTASSIUM SERPL-SCNC: 4.1 MMOL/L — SIGNIFICANT CHANGE UP (ref 3.5–5.3)
POTASSIUM SERPL-SCNC: 4.4 MMOL/L — SIGNIFICANT CHANGE UP (ref 3.5–5.3)
PROT FLD-MCNC: 3.3 G/DL — SIGNIFICANT CHANGE UP
RCV VOL RI: 8000 /UL — HIGH (ref 0–0)
S AUREUS DNA NOSE QL NAA+PROBE: SIGNIFICANT CHANGE UP
SODIUM SERPL-SCNC: 126 MMOL/L — LOW (ref 135–145)
TOTAL NUCLEATED CELL COUNT, BODY FLUID: 462 /UL — SIGNIFICANT CHANGE UP
TUBE TYPE: SIGNIFICANT CHANGE UP

## 2023-05-09 PROCEDURE — 88305 TISSUE EXAM BY PATHOLOGIST: CPT | Mod: 26

## 2023-05-09 PROCEDURE — 88108 CYTOPATH CONCENTRATE TECH: CPT | Mod: 26

## 2023-05-09 PROCEDURE — 49083 ABD PARACENTESIS W/IMAGING: CPT

## 2023-05-09 PROCEDURE — 88112 CYTOPATH CELL ENHANCE TECH: CPT | Mod: 26

## 2023-05-09 PROCEDURE — 88342 IMHCHEM/IMCYTCHM 1ST ANTB: CPT | Mod: 26

## 2023-05-09 PROCEDURE — 99233 SBSQ HOSP IP/OBS HIGH 50: CPT

## 2023-05-09 PROCEDURE — 88341 IMHCHEM/IMCYTCHM EA ADD ANTB: CPT | Mod: 26

## 2023-05-09 RX ORDER — ENOXAPARIN SODIUM 100 MG/ML
100 INJECTION SUBCUTANEOUS EVERY 24 HOURS
Refills: 0 | Status: DISCONTINUED | OUTPATIENT
Start: 2023-05-09 | End: 2023-05-15

## 2023-05-09 RX ADMIN — CHLORHEXIDINE GLUCONATE 1 APPLICATION(S): 213 SOLUTION TOPICAL at 13:51

## 2023-05-09 RX ADMIN — TAMSULOSIN HYDROCHLORIDE 0.4 MILLIGRAM(S): 0.4 CAPSULE ORAL at 12:11

## 2023-05-09 NOTE — PRE PROCEDURE NOTE - PRE PROCEDURE EVALUATION
Interventional Radiology    HPI: 88 y/o M--followed by his oncologist above at [facility] [place]-- patient with a history of 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 on chemo, prostate CA 13 years ago S/P cyberknife presumed to be in remission, apparent recent therapeutic paracentesis on 4/28/23 with large volume (4L) for apparent malignant ascites, with apparently poor PO intake but with dyspnoea for the past 2 weeks, with patient undergoing a CTT angiogram lung which demonstrated multiple segmental and subsegmental B/L PE, with heart strain, peritoneal carcinomatosis and omental caking, moderate pelvic ascites, increased size of hepatic mets, and Duple with RIGHT popliteal DVT>   Patient apparently was started on Lovenox PTA and initiated on heparin gtt on arrival to the ER.   + anorexia  as documented. pt also noticed with hyponatremia. Presents to IR for diagnostic and therapeutic paracentesis.    Allergies: No Known Allergies    Medications (Abx/Cardiac/Anticoagulation/Blood Products)  enoxaparin Injectable: 100 milliGRAM(s) SubCutaneous (05-08 @ 06:41)  urea Oral Powder: 15 Gram(s) Oral (05-08 @ 18:34)    Data:    T(C): 36.3  HR: 107  BP: 120/85  RR: 18  SpO2: 95%    Exam  General: No acute distress  Chest: Non labored breathing  Abdomen: Non-distended  Extremities: No swelling, warm    -WBC 9.30 / HgB 10.3 / Hct 31.9 / Plt 166  -Na 126 / Cl 90 / BUN 38 / Glucose 147  -K 4.1 / CO2 22 / Cr 0.95  -ALT -- / Alk Phos -- / T.Bili --    Imaging: reviewed    Plan: 87y Male presents for paracentesis  -Risks/Benefits/alternatives explained with the patient and/or healthcare proxy and witnessed informed consent obtained.

## 2023-05-09 NOTE — PROGRESS NOTE ADULT - ASSESSMENT
1. Stage IV Cholangiocarcinoma    --Known liver mets    --previously on [place]/ Cis (LD 4/12) +Durvalumab (LD 4/05),  most recently treated w/ Fluorouracil (LD 4/26)   --will re-assess patient after discharge for further treatment  --no treatment while inpatient  --under the care of Dr Chadd Pagan of [facility] [place]    2. PE    --cont Lovenox    --DVT in R popliteal vein noted on LE us done outpatient on 5/3    3, Anemia/thrombocytopenia    -- H/H stable  --likely d/t recent chemotherapy    4. Hyponatremia    --ongoing, Na 126  --seen by renal, suspect volume overload  --w/u per nephrology underway    5. Ascites  --paracentesis pending    will follow and coordinate care w/ [facility] [place]    Sonia Goodson NP  Hematology/ Oncology  [facility]  [phone] (office)  [phone] (alt office)  Evenings and weekends please call MD on call or office     1. Stage IV Cholangiocarcinoma    --Known liver mets    --previously on [place]/ Cis (LD 4/12) +Durvalumab (LD 4/05),  most recently treated w/ Fluorouracil (LD 4/26)   --will re-assess patient after discharge for further treatment  --no treatment while inpatient  --under [place] [facility] [place]    2. PE    --cont Lovenox    --DVT in R popliteal vein noted on LE us done outpatient on 5/3    3, Anemia/    -- H/H stable  --likely d/t recent chemotherapy    4. Hyponatremia    --ongoing, Na 126  --seen by renal, suspect volume overload  --w/u per nephrology underway    5. Ascites  --paracentesis pending    will follow and [place] w/ [facility] [place]    Sonia Goodson NP  Hematology/ Oncology  [facility]  [phone] (office)  [phone] (alt office)  Evenings and weekends please call MD on call or office

## 2023-05-09 NOTE — PROGRESS NOTE ADULT - ASSESSMENT
88 y/o M--[place] [facility] [place] with a history of 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 on chemo, prostate CA 13 years ago S/P cyberknife presumed to be in remission, apparent recent therapeutic paracentesis on 4/28/23 with large volume (4L) for apparent malignant ascites, with apparently poor PO intake but with dyspnoea for the past 2 weeks, with patient undergoing a CTT angiogram lung which demonstrated multiple segmental and subsegmental B/L PE, with heart strain, peritoneal carcinomatosis and omental caking, moderate pelvic ascites, increased size of hepatic mets, and Duple with RIGHT popliteal DVT>   Patient apparently was started on Lovenox PTA and initiated on heparin gtt on arrival to the ER.   + anorexia  as documented. pt also noticed with hyponatremia      1- hyponatremia   2- pulm embolism   3- advance cholangiocarcinoma   4- bilateral dvt        suspect hyponatremia in setting of volume overload, and high urine osmo, indicating SIADH as well.  1L fluid restriction.   Ure-Na 15G po q6h x 2 doses, received 5/8.   plan for paracentesis today, trend sodium.  anticoagulation for pulm embolism

## 2023-05-09 NOTE — PROCEDURE NOTE - PLAN
Ultrasound guided paracentesis without complication  4.7L of cloudy yellow fluid were removed  Fluid sent for studies  Back to floor Ultrasound guided paracentesis without complication  4.7L of cloudy yellow fluid were removed  Fluid sent for studies  Back to floor.

## 2023-05-09 NOTE — PROCEDURE NOTE - PROCEDURE FINDINGS AND DETAILS
Ultrasound-guided diagnostic and therapeutic paracentesis  Fluid sent for chemistry, cytology, gram stain and culture   Approximately 4.7 L of cloudy, yellow fluid were removed.   Post procedure ultrasound showed minimal residual fluid

## 2023-05-10 LAB
ANION GAP SERPL CALC-SCNC: 10 MMOL/L — SIGNIFICANT CHANGE UP (ref 5–17)
ANION GAP SERPL CALC-SCNC: 10 MMOL/L — SIGNIFICANT CHANGE UP (ref 5–17)
ANION GAP SERPL CALC-SCNC: 11 MMOL/L — SIGNIFICANT CHANGE UP (ref 5–17)
BUN SERPL-MCNC: 23 MG/DL — SIGNIFICANT CHANGE UP (ref 7–23)
BUN SERPL-MCNC: 25 MG/DL — HIGH (ref 7–23)
BUN SERPL-MCNC: 29 MG/DL — HIGH (ref 7–23)
CALCIUM SERPL-MCNC: 7.7 MG/DL — LOW (ref 8.4–10.5)
CALCIUM SERPL-MCNC: 7.7 MG/DL — LOW (ref 8.4–10.5)
CALCIUM SERPL-MCNC: 7.9 MG/DL — LOW (ref 8.4–10.5)
CHLORIDE SERPL-SCNC: 91 MMOL/L — LOW (ref 96–108)
CHLORIDE SERPL-SCNC: 91 MMOL/L — LOW (ref 96–108)
CHLORIDE SERPL-SCNC: 92 MMOL/L — LOW (ref 96–108)
CO2 SERPL-SCNC: 22 MMOL/L — SIGNIFICANT CHANGE UP (ref 22–31)
CO2 SERPL-SCNC: 23 MMOL/L — SIGNIFICANT CHANGE UP (ref 22–31)
CO2 SERPL-SCNC: 24 MMOL/L — SIGNIFICANT CHANGE UP (ref 22–31)
CREAT SERPL-MCNC: 0.85 MG/DL — SIGNIFICANT CHANGE UP (ref 0.5–1.3)
CREAT SERPL-MCNC: 0.87 MG/DL — SIGNIFICANT CHANGE UP (ref 0.5–1.3)
CREAT SERPL-MCNC: 0.89 MG/DL — SIGNIFICANT CHANGE UP (ref 0.5–1.3)
EGFR: 84 ML/MIN/1.73M2 — SIGNIFICANT CHANGE UP
EGFR: 85 ML/MIN/1.73M2 — SIGNIFICANT CHANGE UP
EGFR: 85 ML/MIN/1.73M2 — SIGNIFICANT CHANGE UP
GLUCOSE SERPL-MCNC: 113 MG/DL — HIGH (ref 70–99)
GLUCOSE SERPL-MCNC: 119 MG/DL — HIGH (ref 70–99)
GLUCOSE SERPL-MCNC: 89 MG/DL — SIGNIFICANT CHANGE UP (ref 70–99)
GRAM STN FLD: SIGNIFICANT CHANGE UP
HCT VFR BLD CALC: 31.1 % — LOW (ref 39–50)
HGB BLD-MCNC: 10.4 G/DL — LOW (ref 13–17)
MCHC RBC-ENTMCNC: 29.9 PG — SIGNIFICANT CHANGE UP (ref 27–34)
MCHC RBC-ENTMCNC: 33.4 GM/DL — SIGNIFICANT CHANGE UP (ref 32–36)
MCV RBC AUTO: 89.4 FL — SIGNIFICANT CHANGE UP (ref 80–100)
NRBC # BLD: 0 /100 WBCS — SIGNIFICANT CHANGE UP (ref 0–0)
PLATELET # BLD AUTO: 216 K/UL — SIGNIFICANT CHANGE UP (ref 150–400)
POTASSIUM SERPL-MCNC: 4.4 MMOL/L — SIGNIFICANT CHANGE UP (ref 3.5–5.3)
POTASSIUM SERPL-MCNC: 4.5 MMOL/L — SIGNIFICANT CHANGE UP (ref 3.5–5.3)
POTASSIUM SERPL-MCNC: 4.5 MMOL/L — SIGNIFICANT CHANGE UP (ref 3.5–5.3)
POTASSIUM SERPL-SCNC: 4.4 MMOL/L — SIGNIFICANT CHANGE UP (ref 3.5–5.3)
POTASSIUM SERPL-SCNC: 4.5 MMOL/L — SIGNIFICANT CHANGE UP (ref 3.5–5.3)
POTASSIUM SERPL-SCNC: 4.5 MMOL/L — SIGNIFICANT CHANGE UP (ref 3.5–5.3)
RBC # BLD: 3.48 M/UL — LOW (ref 4.2–5.8)
RBC # FLD: 18.1 % — HIGH (ref 10.3–14.5)
SODIUM SERPL-SCNC: 123 MMOL/L — LOW (ref 135–145)
SODIUM SERPL-SCNC: 125 MMOL/L — LOW (ref 135–145)
SODIUM SERPL-SCNC: 126 MMOL/L — LOW (ref 135–145)
SPECIMEN SOURCE: SIGNIFICANT CHANGE UP
WBC # BLD: 9.22 K/UL — SIGNIFICANT CHANGE UP (ref 3.8–10.5)
WBC # FLD AUTO: 9.22 K/UL — SIGNIFICANT CHANGE UP (ref 3.8–10.5)

## 2023-05-10 PROCEDURE — 99233 SBSQ HOSP IP/OBS HIGH 50: CPT

## 2023-05-10 RX ORDER — TOLVAPTAN 15 MG/1
7.5 TABLET ORAL ONCE
Refills: 0 | Status: COMPLETED | OUTPATIENT
Start: 2023-05-10 | End: 2023-05-10

## 2023-05-10 RX ADMIN — ENOXAPARIN SODIUM 100 MILLIGRAM(S): 100 INJECTION SUBCUTANEOUS at 00:42

## 2023-05-10 RX ADMIN — TAMSULOSIN HYDROCHLORIDE 0.4 MILLIGRAM(S): 0.4 CAPSULE ORAL at 12:23

## 2023-05-10 RX ADMIN — CHLORHEXIDINE GLUCONATE 1 APPLICATION(S): 213 SOLUTION TOPICAL at 12:24

## 2023-05-10 RX ADMIN — TOLVAPTAN 7.5 MILLIGRAM(S): 15 TABLET ORAL at 14:16

## 2023-05-10 NOTE — PROGRESS NOTE ADULT - ASSESSMENT
1. Stage IV Cholangiocarcinoma    --Known liver mets    --previously on [place]/ Cis (LD 4/12) +Durvalumab (LD 4/05),  most recently treated w/ Fluorouracil (LD 4/26)   --will re-assess patient after discharge for further treatment  --no treatment while inpatient  --under the care of Dr Chadd Pagan of [facility] [place]    2. PE    --cont Lovenox    --DVT in R popliteal vein noted on LE us done outpatient on 5/3    3, Anemia/    -- H/H stable  --likely d/t recent chemotherapy    4. Hyponatremia    --ongoing but stable at Na 126, possibly new baseline  --seen by renal, suspect volume overload  --mo improvement after paracentesis   --w/u per nephrology underway    5. Ascites  --s/p paracentesis 5/9, 4.7 L out    will follow and coordinate care w/ [facility] [place]    Sonia Goodson NP  Hematology/ Oncology  [facility]  [phone] (office)  [phone] (alt office)  Evenings and weekends please call MD on call or office

## 2023-05-10 NOTE — PROGRESS NOTE ADULT - ASSESSMENT
88 y/o M--followed by his oncologist above at [facility] [place]-- patient with a history of 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 on chemo, prostate CA 13 years ago S/P cyberknife presumed to be in remission, apparent recent therapeutic paracentesis on 4/28/23 with large volume (4L) for apparent malignant ascites, with apparently poor PO intake but with dyspnoea for the past 2 weeks, with patient undergoing a CTT angiogram lung which demonstrated multiple segmental and subsegmental B/L PE, with heart strain, peritoneal carcinomatosis and omental caking, moderate pelvic ascites, increased size of hepatic mets, and Duple with RIGHT popliteal DVT>   Patient apparently was started on Lovenox PTA and initiated on heparin gtt on arrival to the ER.   + anorexia  as documented. pt also noticed with hyponatremia      1- hyponatremia   2- pulm embolism   3- advance cholangiocarcinoma   4- bilateral dvt        suspect hyponatremia in setting of volume overload, and high urine osmo, indicating SIADH as well.  1L fluid restriction.   samsca 7.5 mg po x 1   d/w pt family re: hypotension and liver function deterioration as s.e as well sa rapid correction risk   anticoagulation for pulm embolism

## 2023-05-11 LAB
ANION GAP SERPL CALC-SCNC: 10 MMOL/L — SIGNIFICANT CHANGE UP (ref 5–17)
ANION GAP SERPL CALC-SCNC: 8 MMOL/L — SIGNIFICANT CHANGE UP (ref 5–17)
BUN SERPL-MCNC: 22 MG/DL — SIGNIFICANT CHANGE UP (ref 7–23)
BUN SERPL-MCNC: 25 MG/DL — HIGH (ref 7–23)
CALCIUM SERPL-MCNC: 7.7 MG/DL — LOW (ref 8.4–10.5)
CALCIUM SERPL-MCNC: 7.7 MG/DL — LOW (ref 8.4–10.5)
CHLORIDE SERPL-SCNC: 91 MMOL/L — LOW (ref 96–108)
CHLORIDE SERPL-SCNC: 93 MMOL/L — LOW (ref 96–108)
CO2 SERPL-SCNC: 24 MMOL/L — SIGNIFICANT CHANGE UP (ref 22–31)
CO2 SERPL-SCNC: 24 MMOL/L — SIGNIFICANT CHANGE UP (ref 22–31)
COMMENT - FLUIDS: SIGNIFICANT CHANGE UP
CREAT SERPL-MCNC: 0.91 MG/DL — SIGNIFICANT CHANGE UP (ref 0.5–1.3)
CREAT SERPL-MCNC: 1 MG/DL — SIGNIFICANT CHANGE UP (ref 0.5–1.3)
EGFR: 74 ML/MIN/1.73M2 — SIGNIFICANT CHANGE UP
EGFR: 83 ML/MIN/1.73M2 — SIGNIFICANT CHANGE UP
GLUCOSE SERPL-MCNC: 110 MG/DL — HIGH (ref 70–99)
GLUCOSE SERPL-MCNC: 141 MG/DL — HIGH (ref 70–99)
POTASSIUM SERPL-MCNC: 4.5 MMOL/L — SIGNIFICANT CHANGE UP (ref 3.5–5.3)
POTASSIUM SERPL-MCNC: 4.5 MMOL/L — SIGNIFICANT CHANGE UP (ref 3.5–5.3)
POTASSIUM SERPL-SCNC: 4.5 MMOL/L — SIGNIFICANT CHANGE UP (ref 3.5–5.3)
POTASSIUM SERPL-SCNC: 4.5 MMOL/L — SIGNIFICANT CHANGE UP (ref 3.5–5.3)
SODIUM SERPL-SCNC: 125 MMOL/L — LOW (ref 135–145)
SODIUM SERPL-SCNC: 125 MMOL/L — LOW (ref 135–145)

## 2023-05-11 PROCEDURE — 99232 SBSQ HOSP IP/OBS MODERATE 35: CPT

## 2023-05-11 RX ORDER — TOLVAPTAN 15 MG/1
15 TABLET ORAL ONCE
Refills: 0 | Status: COMPLETED | OUTPATIENT
Start: 2023-05-11 | End: 2023-05-11

## 2023-05-11 RX ADMIN — TOLVAPTAN 15 MILLIGRAM(S): 15 TABLET ORAL at 12:06

## 2023-05-11 RX ADMIN — ENOXAPARIN SODIUM 100 MILLIGRAM(S): 100 INJECTION SUBCUTANEOUS at 23:26

## 2023-05-11 RX ADMIN — ENOXAPARIN SODIUM 100 MILLIGRAM(S): 100 INJECTION SUBCUTANEOUS at 00:04

## 2023-05-11 RX ADMIN — CHLORHEXIDINE GLUCONATE 1 APPLICATION(S): 213 SOLUTION TOPICAL at 12:37

## 2023-05-11 RX ADMIN — TAMSULOSIN HYDROCHLORIDE 0.4 MILLIGRAM(S): 0.4 CAPSULE ORAL at 12:06

## 2023-05-11 NOTE — SWALLOW BEDSIDE ASSESSMENT ADULT - ASR SWALLOW REFERRAL
If team deems clinically indicated, may consider GI consult for new pt reports this admission of intermittent chest globus sensation after eating which passes after eructation

## 2023-05-11 NOTE — SWALLOW BEDSIDE ASSESSMENT ADULT - COMMENTS
hx con't    dietician> Per pt, reports poor to fair PO intake x 2 weeks PTA in setting of abdominal distension/discomfort/bloating. To note, pt s/p paracentesis 4/28. Endorsed enjoyment of most foods, including fish, chicken, pasta, legumes. Prefers soft foods (soup) at this time. Denies food allergies. Denies intolerance to chewing/swallowing, however endorsed sometimes feeling like "things get stuck" in chest area after eating with occasional cough.     d/c plan home    CXR 5/3 IMPRESSION: Right lower lung hazy opacities right greater than left may represent atelectasis versus small pleural effusion.    No prior SLP exams or MBS in PACS

## 2023-05-11 NOTE — SWALLOW BEDSIDE ASSESSMENT ADULT - H & P REVIEW
86 y/o M--followed by his oncologist above at [facility] [place] patient with a history of 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 on chemo, prostate CA 13 years ago S/P cyberknife presumed to be in remission, apparent recent therapeutic paracentesis on 4/28/23 with large volume (4L) for apparent malignant ascites, with apparently poor PO intake but with dyspnea for the past 2 weeks, unclear if presumed to be from patient's ascites, with patient undergoing a CTT angiogram lung which demonstrated multiple segmental and subsegmental B/L PE, with heart strain, peritoneal carcinomatosis and omental caking, moderate pelvic ascites, increased size of hepatic mets, and Duplex with RIGHT popliteal DVT>Patient apparently was started on Lovenox PTA and initiated on heparin gtt on arrival to the ER. Patient presently denies chest pain/pressure. NO dyspnoea, no palpitations. NO hemoptysis. NO abdominal pain, no red blood per rectum or melena.  NO back pain, no tearing back pain.  NO HA, no focal weakness. NO dysuria, no hematuria.  + anorexia and patient refuses a diet at present. 5/9 s/p therpeutic paracentesis  Heme/Onc following with No plan for systemic CA treatment while in hospital./yes 88 y/o M-[place] [facility] [place] patient with a history of 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 on chemo, prostate CA 13 years ago S/P cyberknife presumed to be in remission, apparent recent therapeutic paracentesis on 4/28/23 with large volume (4L) for apparent malignant ascites, with apparently poor PO intake but with dyspnea for the past 2 weeks, unclear if presumed to be from patient's ascites, with patient undergoing a CTT angiogram lung which demonstrated multiple segmental and subsegmental B/L PE, with heart strain, peritoneal carcinomatosis and omental caking, moderate pelvic ascites, increased size of hepatic mets, and Duplex with RIGHT popliteal DVT>Patient apparently was started on Lovenox PTA and initiated on heparin gtt on arrival to the ER. Patient presently denies chest pain/pressure. NO dyspnoea, no palpitations. NO hemoptysis. NO abdominal pain, no red blood per rectum or melena.  NO back pain, no tearing back pain.  NO HA, no focal weakness. NO dysuria, no hematuria.  + anorexia and patient refuses a diet at present. 5/9 s/p therpeutic paracentesis  Heme/Onc following with No plan for systemic CA treatment while in hospital./yes

## 2023-05-11 NOTE — SWALLOW BEDSIDE ASSESSMENT ADULT - SWALLOW EVAL: DIAGNOSIS
88 y/o M, with history of stage IV cholangiocarcinoma with mets to the liver (on chemo), prostate CA, p/w poor PO intake and dyspnea, found with multiple B/L PE, heart strain, peritoneal carcinomatosis, omental caking, pelvic ascites, increased size of hepatic mets, now s/p paracentesis 5/9. Pt presents with overtly functional swallow profile; diet modification not indicated. Swallow profile characterized by functional oral management, timely initiation of swallow, with no overt s/sx of aspiration/penetration with regular solids/thin liquids.

## 2023-05-11 NOTE — SWALLOW BEDSIDE ASSESSMENT ADULT - SWALLOW EVAL: RECOMMENDED FEEDING/EATING TECHNIQUES
allow for swallow between intakes/hard swallow w/ each bite or sip/oral hygiene/position upright (90 degrees)/small sips/bites

## 2023-05-11 NOTE — PROGRESS NOTE ADULT - ASSESSMENT
1. Stage IV Cholangiocarcinoma  --Known liver mets    --previously on [place]/ Cis (LD 4/12) +Durvalumab (LD 4/05),  most recently treated w/ Fluorouracil (LD 4/26)   --will re-assess patient after discharge for further treatment  --no treatment while inpatient  --under the care of Dr Chadd Urbano of [facility] [place]    2. PE  --cont therapeutic lovenox  --Vascular following  --DVT in R popliteal vein noted on LE us done outpatient on 5/3    3, Anemia  -- H/H stable  --likely d/t recent chemotherapy    4. Hyponatremia  --ongoing but stable at Na 125  --Nephro following, suspect volume overload  --On fluid restriction, received samsca    5. Ascites  --s/p paracentesis 5/9, 4.7 L out    Will continue to follow.    Kyle Pearce PA-C  Hematology/Oncology  [facility]  [phone] (office)

## 2023-05-11 NOTE — SWALLOW BEDSIDE ASSESSMENT ADULT - ASPIRATION PRECAUTIONS
Monitor for s/s aspiration/laryngeal penetration. If noted:  D/C p.o. intake, provide non-oral nutrition/hydration/meds, and contact this service @ [id number]/[email or web address]

## 2023-05-11 NOTE — PROGRESS NOTE ADULT - ASSESSMENT
88 y/o M--followed by his onc[facility]-- patient with a history of 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 on chemo, prostate CA 13 years ago S/P cyberknife presumed to be in remission, apparent recent therapeutic paracentesis on 4/28/23 with large volume (4L) for apparent malignant ascites, with apparently poor PO intake but with dyspnoea for the past 2 weeks, with patient undergoing a CTT angiogram lung which demonstrated multiple segmental and subsegmental B/L PE, with heart strain, peritoneal carcinomatosis and omental caking, moderate pelvic ascites, increased size of hepatic mets, and Duple with RIGHT popliteal DVT>   Patient apparently was started on Lovenox PTA and initiated on heparin gtt on arrival to the ER.   + anorexia  as documented. pt also noticed with hyponatremia      1- hyponatremia   2- pulm embolism   3- advance cholangiocarcinoma   4- bilateral dvt        suspect hyponatremia in setting of volume overload, and high urine osmo, indicating SIADH as well.  dc  fluid restriction while receiving samsca  fluid status improving but no serum sodium  improvement with samsca 7.5 mg po  will attempt 15 mg po x 1   to have LFT in am   anticoagulation for pulm embolism   dw family at bedside

## 2023-05-11 NOTE — SWALLOW BEDSIDE ASSESSMENT ADULT - SLP GENERAL OBSERVATIONS
Pt repositioned to be seated upright in bed, AOx3, on room air, able to follow all directives, verbally expressing all wants/needs. pt and daughter at bedside report no hx of dysphagia, with improvement in p.o. intake after paracentesis. Pt reporting new symptoms occuring this admission of intermittent globus sensation after eating which passess with eructation. Pt and daughter requested ability to choose meal menu items (kitchen called and menu provided), request for supplemental shakes (provider made aware) given reduced p.o. intake, and to be seen by PT (PT Jairo CHAVARRIA contacted for follow-up as indicated).

## 2023-05-12 LAB
ALBUMIN SERPL ELPH-MCNC: 1.7 G/DL — LOW (ref 3.3–5)
ALP SERPL-CCNC: 111 U/L — SIGNIFICANT CHANGE UP (ref 40–120)
ALT FLD-CCNC: 12 U/L — SIGNIFICANT CHANGE UP (ref 10–45)
ANION GAP SERPL CALC-SCNC: 11 MMOL/L — SIGNIFICANT CHANGE UP (ref 5–17)
ANION GAP SERPL CALC-SCNC: 12 MMOL/L — SIGNIFICANT CHANGE UP (ref 5–17)
ANION GAP SERPL CALC-SCNC: 9 MMOL/L — SIGNIFICANT CHANGE UP (ref 5–17)
AST SERPL-CCNC: 17 U/L — SIGNIFICANT CHANGE UP (ref 10–40)
BILIRUB SERPL-MCNC: 0.3 MG/DL — SIGNIFICANT CHANGE UP (ref 0.2–1.2)
BUN SERPL-MCNC: 21 MG/DL — SIGNIFICANT CHANGE UP (ref 7–23)
BUN SERPL-MCNC: 22 MG/DL — SIGNIFICANT CHANGE UP (ref 7–23)
BUN SERPL-MCNC: 25 MG/DL — HIGH (ref 7–23)
CALCIUM SERPL-MCNC: 7.4 MG/DL — LOW (ref 8.4–10.5)
CALCIUM SERPL-MCNC: 7.5 MG/DL — LOW (ref 8.4–10.5)
CALCIUM SERPL-MCNC: 8.2 MG/DL — LOW (ref 8.4–10.5)
CHLORIDE SERPL-SCNC: 91 MMOL/L — LOW (ref 96–108)
CHLORIDE SERPL-SCNC: 96 MMOL/L — SIGNIFICANT CHANGE UP (ref 96–108)
CHLORIDE SERPL-SCNC: 96 MMOL/L — SIGNIFICANT CHANGE UP (ref 96–108)
CO2 SERPL-SCNC: 22 MMOL/L — SIGNIFICANT CHANGE UP (ref 22–31)
CO2 SERPL-SCNC: 23 MMOL/L — SIGNIFICANT CHANGE UP (ref 22–31)
CO2 SERPL-SCNC: 24 MMOL/L — SIGNIFICANT CHANGE UP (ref 22–31)
CREAT SERPL-MCNC: 0.84 MG/DL — SIGNIFICANT CHANGE UP (ref 0.5–1.3)
CREAT SERPL-MCNC: 0.9 MG/DL — SIGNIFICANT CHANGE UP (ref 0.5–1.3)
CREAT SERPL-MCNC: 0.96 MG/DL — SIGNIFICANT CHANGE UP (ref 0.5–1.3)
EGFR: 77 ML/MIN/1.73M2 — SIGNIFICANT CHANGE UP
EGFR: 84 ML/MIN/1.73M2 — SIGNIFICANT CHANGE UP
EGFR: 85 ML/MIN/1.73M2 — SIGNIFICANT CHANGE UP
GLUCOSE SERPL-MCNC: 104 MG/DL — HIGH (ref 70–99)
GLUCOSE SERPL-MCNC: 123 MG/DL — HIGH (ref 70–99)
GLUCOSE SERPL-MCNC: 143 MG/DL — HIGH (ref 70–99)
POTASSIUM SERPL-MCNC: 4.5 MMOL/L — SIGNIFICANT CHANGE UP (ref 3.5–5.3)
POTASSIUM SERPL-MCNC: 4.5 MMOL/L — SIGNIFICANT CHANGE UP (ref 3.5–5.3)
POTASSIUM SERPL-MCNC: 5.1 MMOL/L — SIGNIFICANT CHANGE UP (ref 3.5–5.3)
POTASSIUM SERPL-SCNC: 4.5 MMOL/L — SIGNIFICANT CHANGE UP (ref 3.5–5.3)
POTASSIUM SERPL-SCNC: 4.5 MMOL/L — SIGNIFICANT CHANGE UP (ref 3.5–5.3)
POTASSIUM SERPL-SCNC: 5.1 MMOL/L — SIGNIFICANT CHANGE UP (ref 3.5–5.3)
PROT SERPL-MCNC: 4.5 G/DL — LOW (ref 6–8.3)
SODIUM SERPL-SCNC: 127 MMOL/L — LOW (ref 135–145)
SODIUM SERPL-SCNC: 128 MMOL/L — LOW (ref 135–145)
SODIUM SERPL-SCNC: 129 MMOL/L — LOW (ref 135–145)

## 2023-05-12 RX ORDER — POLYETHYLENE GLYCOL 3350 17 G/17G
17 POWDER, FOR SOLUTION ORAL ONCE
Refills: 0 | Status: DISCONTINUED | OUTPATIENT
Start: 2023-05-12 | End: 2023-05-15

## 2023-05-12 RX ORDER — ENOXAPARIN SODIUM 100 MG/ML
100 INJECTION SUBCUTANEOUS
Qty: 30 | Refills: 0
Start: 2023-05-12 | End: 2023-06-10

## 2023-05-12 RX ORDER — TOLVAPTAN 15 MG/1
15 TABLET ORAL ONCE
Refills: 0 | Status: COMPLETED | OUTPATIENT
Start: 2023-05-12 | End: 2023-05-12

## 2023-05-12 RX ADMIN — TOLVAPTAN 15 MILLIGRAM(S): 15 TABLET ORAL at 10:30

## 2023-05-12 RX ADMIN — TAMSULOSIN HYDROCHLORIDE 0.4 MILLIGRAM(S): 0.4 CAPSULE ORAL at 11:16

## 2023-05-12 RX ADMIN — CHLORHEXIDINE GLUCONATE 1 APPLICATION(S): 213 SOLUTION TOPICAL at 12:23

## 2023-05-12 NOTE — PROGRESS NOTE ADULT - ASSESSMENT
86 y/o M--followed by his oncologist above at 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 on chemo, prostate CA 13 years ago S/P cyberknife presumed to be in remission, apparent recent therapeutic paracentesis on 4/28/23 with large volume (4L) for apparent malignant ascites, with apparently poor PO intake but with dyspnoea for the past 2 weeks, with patient undergoing a CTT angiogram lung which demonstrated multiple segmental and subsegmental B/L PE, with heart strain, peritoneal carcinomatosis and omental caking, moderate pelvic ascites, increased size of hepatic mets, and Duple with RIGHT popliteal DVT>   Patient apparently was started on Lovenox PTA and initiated on heparin gtt on arrival to the ER.   + anorexia  as documented. pt also noticed with hyponatremia      1- hyponatremia   2- pulm embolism   3- advance cholangiocarcinoma   4- bilateral dvt        suspect hyponatremia in setting of volume overload, and high urine osmo, indicating SIADH as well.  dc  fluid restriction while receiving samsca  fluid status improving but no serum sodium  improvement with samsca 7.5 mg po  will attempt 15 mg po x 1  second day today   trend k as it has been rising   to have LFT in am   anticoagulation for pulm embolism

## 2023-05-12 NOTE — PROGRESS NOTE ADULT - ASSESSMENT
1. Stage IV Cholangiocarcinoma  --Known liver mets    --previously on [place]/ Cis (LD 4/12) +Durvalumab (LD 4/05),  most recently treated w/ Fluorouracil (LD 4/26)   --will re-assess patient after discharge for further treatment  --no treatment while inpatient  --under the care of [place]. Chadd Urbano [place] [facility]    2. PE  --cont therapeutic lovenox  --Vascular following  --DVT in R popliteal vein noted on LE us done outpatient on 5/3  --Will check heparin assay anti-Xa tomorrow morning 4am    3, Anemia  -- H/H stable  --likely d/t recent chemotherapy    4. Hyponatremia  --Improving, up to 129 today  --Nephro following, suspect volume overload  --On fluid restriction, received samsca    5. Ascites  --s/p paracentesis 5/9, 4.7 L out    Will continue to follow.    Kyle Pearce PA-C  Hematology/Oncology  [facility]  [phone] (office)

## 2023-05-13 ENCOUNTER — TRANSCRIPTION ENCOUNTER (OUTPATIENT)
Age: 88
End: 2023-05-13

## 2023-05-13 LAB
ANION GAP SERPL CALC-SCNC: 10 MMOL/L — SIGNIFICANT CHANGE UP (ref 5–17)
ANION GAP SERPL CALC-SCNC: 10 MMOL/L — SIGNIFICANT CHANGE UP (ref 5–17)
BUN SERPL-MCNC: 23 MG/DL — SIGNIFICANT CHANGE UP (ref 7–23)
BUN SERPL-MCNC: 23 MG/DL — SIGNIFICANT CHANGE UP (ref 7–23)
CALCIUM SERPL-MCNC: 7.6 MG/DL — LOW (ref 8.4–10.5)
CALCIUM SERPL-MCNC: 7.9 MG/DL — LOW (ref 8.4–10.5)
CHLORIDE SERPL-SCNC: 94 MMOL/L — LOW (ref 96–108)
CHLORIDE SERPL-SCNC: 95 MMOL/L — LOW (ref 96–108)
CO2 SERPL-SCNC: 23 MMOL/L — SIGNIFICANT CHANGE UP (ref 22–31)
CO2 SERPL-SCNC: 25 MMOL/L — SIGNIFICANT CHANGE UP (ref 22–31)
CREAT SERPL-MCNC: 0.92 MG/DL — SIGNIFICANT CHANGE UP (ref 0.5–1.3)
CREAT SERPL-MCNC: 0.96 MG/DL — SIGNIFICANT CHANGE UP (ref 0.5–1.3)
EGFR: 77 ML/MIN/1.73M2 — SIGNIFICANT CHANGE UP
EGFR: 82 ML/MIN/1.73M2 — SIGNIFICANT CHANGE UP
GLUCOSE SERPL-MCNC: 116 MG/DL — HIGH (ref 70–99)
GLUCOSE SERPL-MCNC: 127 MG/DL — HIGH (ref 70–99)
LMWH PPP CHRO-ACNC: 0.67 IU/ML — SIGNIFICANT CHANGE UP (ref 0.5–1.1)
POTASSIUM SERPL-MCNC: 4.8 MMOL/L — SIGNIFICANT CHANGE UP (ref 3.5–5.3)
POTASSIUM SERPL-MCNC: 4.9 MMOL/L — SIGNIFICANT CHANGE UP (ref 3.5–5.3)
POTASSIUM SERPL-SCNC: 4.8 MMOL/L — SIGNIFICANT CHANGE UP (ref 3.5–5.3)
POTASSIUM SERPL-SCNC: 4.9 MMOL/L — SIGNIFICANT CHANGE UP (ref 3.5–5.3)
SODIUM SERPL-SCNC: 128 MMOL/L — LOW (ref 135–145)
SODIUM SERPL-SCNC: 129 MMOL/L — LOW (ref 135–145)

## 2023-05-13 RX ADMIN — CHLORHEXIDINE GLUCONATE 1 APPLICATION(S): 213 SOLUTION TOPICAL at 12:49

## 2023-05-13 RX ADMIN — TAMSULOSIN HYDROCHLORIDE 0.4 MILLIGRAM(S): 0.4 CAPSULE ORAL at 12:44

## 2023-05-13 RX ADMIN — ENOXAPARIN SODIUM 100 MILLIGRAM(S): 100 INJECTION SUBCUTANEOUS at 00:11

## 2023-05-13 RX ADMIN — Medication 30 MILLILITER(S): at 09:42

## 2023-05-13 NOTE — DISCHARGE NOTE PROVIDER - NPI NUMBER (FOR SYSADMIN USE ONLY) :
[[id number] [[id number]],[[id number]] [[id number]],[[id number]],[[id number]]

## 2023-05-13 NOTE — PROGRESS NOTE ADULT - ASSESSMENT
LENA BATISTA is a 87y Male who presents with a chief complaint of VTE    Metastatic Cholangiocarcinoma  ·	Patient follows with Dr. Urbano, [facility].  ·	Last treated with 5-FU on April 26th.  ·	No systemic therapy while inpatient  ·	Paracentesis as needed for comfort    Venous Thromboembolism  ·	Patient is on enoxaparin.  ·	Anti-Xa level appears to be in therapeutic range.    Will continue to follow.    Milad Castellanos MD  Hematology/Oncology  O: [phone] LENA BATISTA is a 87y Male who presents with a chief complaint of VTE    Metastatic Cholangiocarcinoma  ·	Patient follows with Dr. Urbano, [facility].  ·	Last treated with 5-FU on April 26th.  ·	No systemic therapy while inpatient  ·	Paracentesis as needed for comfort. Family is wondering whether another one can be done prior to discharge.    Venous Thromboembolism  ·	Patient is on enoxaparin.  ·	Anti-Xa level appears to be in therapeutic range.    Will continue to follow.    Milad Castellanos MD  Hematology/Oncology  O: [phone]

## 2023-05-13 NOTE — DISCHARGE NOTE PROVIDER - HOSPITAL COURSE
87y M pmhx stage IV cholangiocarcinoma w/ mets to liver, s/p 5-FU/LV (4/26/23), presents to ED as [facility] transfer due multiple b/l segmental and subsegmental pulmonary emboli, DVT study with R popliteal DVT. Pt started on Lovenox PTA. Pt has had dyspnea on exertion, b/l LE edema and abdominal distention x 1 week. Paracentesis done on 4/27 w/ removal of 4L.    Pulmonary embolism, bilateral.   -Scan revealing bilateral segmental and subsegmental pulmonary emoboli w/ R heart strain in setting of malignancy   -DVT study with R popliteal DVT   -Status post heparin gtt - changed to lovenox  -Heparin assay anti-Xa checked on 5/13- level .67        Hyponatremia.   -Hyponatremia, high urine osmolality (indicating SIADH as well) likely sec to fluid overload   -Status post fluid restriction   -Status post Ure-Na 15G po q6h x 2 doses.  -Seen with ascites - status post tap on 5/9 with removal of 4 L  -Improving up to 129 on 5/13    Cholangiocarcinoma.   -History of stage IV cholangiocarcinoma with mets to liver  -Previously on [place]/ Cis (LD 4/12) +Durvalumab (LD 4/05),  most recently treated w/ Fluorouracil (LD 4/26)    -No systemic treatment while inpatient.  -[place] [facility]        Prostate cancer.   -On tamsulosin for LUTS with history of prostate CA presumed to be disease free with past cyber knife.       Anemia  - H/H stable  -likely due to recent chemotherapy        Pt has been medically cleared for discharge home w/ outpatient PT and home care per Dr. Santiago 87y M pmhx stage IV cholangiocarcinoma w/ mets to liver, s/p 5-FU/LV (4/26/23), presents to ED as [facility] transfer due multiple b/l segmental and subsegmental pulmonary emboli, DVT study with R popliteal DVT. Pt started on Lovenox PTA. Pt has had dyspnea on exertion, b/l LE edema and abdominal distention x 1 week. Paracentesis done on 4/27 w/ removal of 4L.    Pulmonary embolism, bilateral.   -Scan revealing bilateral segmental and subsegmental pulmonary emboli w/ R heart strain in setting of malignancy   -Status post heparin gtt - changed to lovenox  -Heparin assay anti-Xa checked on 5/13- level .67     Bilateral LE DVT  - LE Doppler - RIGHT: DVT in right soleal vein and tibial peroneal trunk and Acute DVT is seen in the tibial peroneal trunk.  - Status post heparin gtt - changed to lovenox    Hyponatremia.   -Hyponatremia, high urine osmolality (indicating SIADH as well) likely sec to fluid overload   -Status post fluid restriction   -Status post Ure-Na 15G po q6h x 2 doses. and s/p Samsca x3.     Ascites  - US Abdomen: Moderate to severe four quadrant ascites. on 5/8  - ascites - status post tap on 5/9 with removal of 4 L  -Improving up to 129 on 5/13    Cholangiocarcinoma.   -History of stage IV cholangiocarcinoma with mets to liver  -Previously on [place]/ Cis (LD 4/12) +Durvalumab (LD 4/05),  most recently treated w/ Fluorouracil (LD 4/26)    -No systemic treatment while inpatient.  -Under the care of Dr Chadd Pagan of [facility]- follow up outpatient        Prostate cancer.   -On tamsulosin for LUTS with history of prostate CA presumed to be disease free with past cyber knife.    Anemia  - H/H stable  -likely due to recent chemotherapy        Pt has been medically cleared for discharge home w/ outpatient PT and home care per Dr. Santiago 87y M pmhx stage IV cholangiocarcinoma w/ mets to liver, s/p 5-FU/LV (4/26/23), presents to ED as [facility] transfer due multiple b/l segmental and subsegmental pulmonary emboli, DVT study with R popliteal DVT. Pt started on Lovenox PTA. Pt has had dyspnea on exertion, b/l LE edema and abdominal distention x 1 week. Paracentesis done on 4/27 w/ removal of 4L.    Pulmonary embolism, bilateral.   - CTT angiogram lung which demonstrated multiple segmental and subsegmental pulmonary emboli w/ R heart strain in setting of malignancy   -Status post heparin gtt - changed to Lovenox  - Heparin assay anti-Xa checked on 5/13- level .67 appears to be in therapeutic range    Bilateral LE DVT  - LE Doppler - RIGHT: DVT in right soleal vein and tibial peroneal trunk and Acute DVT is seen in the tibial peroneal trunk.  - Status post heparin gtt - changed to Lovenox    Hyponatremia.   -Hyponatremia, high urine osmolality (indicating SIADH as well) likely sec to fluid overload   -Status post fluid restriction   -Status post Ure-Na 15G po q6h x 2 doses. and s/p Samsca x3.   -Improving up to 129 on 5/13    Ascites  - S/P recent therapeutic paracentesis on 4/28/23 with large volume (4L) for apparent malignant ascites as outpt  - Now with abdominal distention  - US Abdomen on 5/8 Moderate to severe four quadrant ascites.  - status post tap on 5/9 with removal of 4 L  - Repeat US on 5/14 showed moderate Ascites  - Repeat paracentesis on 5/15/23  - Paracentesis PRN for comfort - follows with Dr. Urbano, [facility].    Cholangiocarcinoma.   -History of stage IV cholangiocarcinoma with mets to liver  - CT Angiogram also showed  moderate pelvic ascites, increased size of hepatic mets,   -Previously on [place]/ Cis (LD 4/12) +Durvalumab (LD 4/05),  most recently treated w/ Fluorouracil (LD 4/26)    -No systemic treatment while inpatient.  -Under the care of Dr Chadd Pagan of [facility]- follow up outpatient     Prostate cancer.   -On tamsulosin for LUTS with history of prostate CA presumed to be disease free with past cyber knife.    Anemia  - H/H stable  -likely due to recent chemotherapy    Pt has been medically cleared for discharge home w/ outpatient PT and home care per Dr. Santiago 87y M pmhx stage IV cholangiocarcinoma w/ mets to liver, s/p 5-FU/LV (4/26/23), presents [facility] ED as [facility] due multiple b/l segmental and subsegmental pulmonary emboli, DVT study with R popliteal DVT. Pt started on Lovenox PTA. Pt has had dyspnea on exertion, b/l LE edema and abdominal distention x 1 week. Paracentesis done on 4/27 w/ removal of 4L.    Pulmonary embolism, bilateral.   - CTT angiogram lung which demonstrated multiple segmental and subsegmental pulmonary emboli w/ R heart strain in setting of malignancy -Status post heparin gtt - changed to Lovenox  - Heparin assay anti-Xa checked on 5/13- level .67 appears to be in therapeutic range    Bilateral LE DVT  - LE Doppler - RIGHT: DVT in right soleal vein and tibial peroneal trunk and Acute DVT is seen in the tibial peroneal trunk.  - Status post heparin gtt - changed to Lovenox    Hyponatremia.   -Hyponatremia, high urine osmolality (indicating SIADH as well) likely sec to fluid overload   -Status post fluid restriction   -Status post Ure-Na 15G po q6h x 2 doses. and s/p Samsca x3.   -Improving up to 129 on 5/13    Ascites  - S/P recent therapeutic paracentesis on 4/28/23 with large volume (4L) for apparent malignant ascites as outpt  - Now with abdominal distention  - US Abdomen on 5/8 Moderate to severe four quadrant ascites.  - status post tap on 5/9 with removal of 4 L  - Repeat US on 5/14 showed moderate Ascites  - Repeat paracentesis on 5/15/23  - Paracentesis PRN for comfort - follows with Dr. Urbano, [facility].    Cholangiocarcinoma.   -History of stage IV cholangiocarcinoma with mets to liver  - CT Angiogram also showed  moderate pelvic ascites, increased size of hepatic mets,   -Previously on [place]/ Cis (LD 4/12) +Durvalumab (LD 4/05),  most recently treated w/ Fluorouracil (LD 4/26)    -No systemic treatment while inpatient.  -Under the care of [place] [facility]     Prostate cancer.   -On tamsulosin for LUTS with history of prostate CA presumed to be disease free with past cyber knife.    Anemia  - H/H stable  -likely due to recent chemotherapy    Pt has been medically cleared for discharge home w/ outpatient PT and home care per Dr. Santiago 87y M pmhx stage IV cholangiocarcinoma w/ mets to liver, s/p 5-FU/LV (4/26/23), presents to ED as [facility] transfer due multiple b/l segmental and subsegmental pulmonary emboli, DVT study with R popliteal DVT. Pt started on Lovenox PTA. Pt has had dyspnea on exertion, b/l LE edema and abdominal distention x 1 week. Paracentesis done on 4/27 w/ removal of 4L.    Pulmonary embolism, bilateral.   - CTT angiogram lung which demonstrated multiple segmental and subsegmental pulmonary emboli w/ R heart strain in setting of malignancy -Status post heparin gtt - changed to Lovenox  - Heparin assay anti-Xa checked on 5/13- level .67 appears to be in therapeutic range    Bilateral LE DVT  - LE Doppler - RIGHT: DVT in right soleal vein and tibial peroneal trunk and Acute DVT is seen in the tibial peroneal trunk.  - Status post heparin gtt - changed to Lovenox    Hyponatremia.   -Hyponatremia, high urine osmolality (indicating SIADH as well) likely sec to fluid overload   -Status post fluid restriction   -Status post Ure-Na 15G po q6h x 2 doses. and s/p Samsca x3.   -Improving up to 129 on 5/15    Ascites  - S/P recent therapeutic paracentesis on 4/28/23 with large volume (4L) for apparent malignant ascites as outpt  - Now with abdominal distention  - US Abdomen on 5/8 Moderate to severe four quadrant ascites.  - status post tap on 5/9 with removal of 4 L  - Repeat US on 5/14 showed moderate Ascites - 2/7L removed  - Repeat paracentesis on 5/15/23  - Paracentesis PRN for comfort - follows with Dr. Urbano, [facility].    Cholangiocarcinoma.   -History of stage IV cholangiocarcinoma with mets to liver  - CT Angiogram also showed  moderate pelvic ascites, increased size of hepatic mets,   -Previously on [place]/ Cis (LD 4/12) +Durvalumab (LD 4/05),  most recently treated w/ Fluorouracil (LD 4/26)    -No systemic treatment while inpatient.  -Under the care of [facility] [place] [facility]- follow up outpatient     Prostate cancer.   -On tamsulosin for LUTS with history of prostate CA presumed to be disease free with past cyber knife.    Anemia  - H/H stable  -likely due to recent chemotherapy    Pt has been medically cleared for discharge home w/ outpatient PT and home care per Dr. Santiago 87y M pmhx stage IV cholangiocarcinoma w/ mets to liver, s/p 5-FU/LV (4/26/23), presents to ED as [facility] transfer due multiple b/l segmental and subsegmental pulmonary emboli, DVT study with R popliteal DVT. Pt started on Lovenox PTA. Pt has had dyspnea on exertion, b/l LE edema and abdominal distention x 1 week. Paracentesis done on 4/27 w/ removal of 4L.    Pulmonary embolism, bilateral.   - CTT angiogram lung which demonstrated multiple segmental and subsegmental pulmonary emboli w/ R heart strain in setting of malignancy -Status post heparin gtt - changed to Lovenox  - Heparin assay anti-Xa checked on 5/13- level .67 appears to be in therapeutic range    Bilateral LE DVT  - LE Doppler - RIGHT: DVT in right soleal vein and tibial peroneal trunk and Acute DVT is seen in the tibial peroneal trunk.  - Status post heparin gtt - changed to Lovenox    Hyponatremia.   -Hyponatremia, high urine osmolality (indicating SIADH as well) likely sec to fluid overload   -Status post fluid restriction   -Status post Ure-Na 15G po q6h x 2 doses. and s/p Samsca x3.   -Improving up to 129 on 5/15  - Discharge on Lasix 20 mg daily per Dr. Acosta    Ascites  - S/P recent therapeutic paracentesis on 4/28/23 with large volume (4L) for apparent malignant ascites as outpt- Now with abdominal distention  - US Abdomen on 5/8 Moderate to severe four quadrant ascites.  - status post tap on 5/9 with removal of 4 L  - Repeat US on 5/14 showed moderate Ascites - 2/7L removed  - Repeat paracentesis on 5/15/23  - Paracentesis PRN for comfort - follows with Dr. Urbano, [facility].    Cholangiocarcinoma.   -History of stage IV cholangiocarcinoma with mets to liver  - CT Angiogram also showed  moderate pelvic ascites, increased size of hepatic mets,   -Previously on [place]/ Cis (LD 4/12) +Durvalumab (LD 4/05),  most recently treated w/ Fluorouracil (LD 4/26)    -No systemic treatment while inpatient.  -Under the care of Dr Chadd Pagan of [facility]- follow up outpatient     Prostate cancer.   -On tamsulosin for LUTS with history of prostate CA presumed to be disease free with past cyber knife.    Anemia  - H/H stable  -likely due to recent chemotherapy    Pt has been medically cleared for discharge home w/ outpatient PT and home care per Dr. Santiago and Dr. Acosta on Lasix 20mg daily

## 2023-05-13 NOTE — DISCHARGE NOTE PROVIDER - CARE PROVIDER_API CALL
Chelsea Banuelos)  Family Medicine  [address] [place] [address]  Phone: [phone]  Fax: [phone]  Follow Up Time:     Chadd Urbano  Hematology/Oncology  [address] [place] [address]  Phone: [phone]  Fax: [phone]  Follow Up Time:    Chelsea Banuelos)  Family Medicine  [address] [place] [address]  Phone: [phone]  Fax: [phone]  Follow Up Time:     Chadd Urbano  Hematology/Oncology  [address] [place] [address]  Phone: [phone]  Fax: [phone]  Follow Up Time:     Vincent Morin (DO)  Cardiovascular Disease; Nuclear Cardiology  [address] [place] [address]  Phone: [phone]  Fax: [phone]  Follow Up Time: 2 weeks   Chelsea Banuelos (MD)  Family Medicine  [address] [place] [address]  Phone: [phone]  Fax: [phone]  Follow Up Time:     Chadd Urbano  Hematology/Oncology  [address] [place] [address]  Phone: [phone]  Fax: [phone]  Follow Up Time:     Vincent Morin (DO)  Cardiovascular Disease; Nuclear Cardiology  [address] [place] [address]  Phone: [phone]  Fax: [phone]  Follow Up Time: 2 weeks    Patricia Acosta (DO)  Nephrology  [address] [place] [address] [address]  [place] [address]  Phone: [phone]  Fax: [phone]  Follow Up Time: 1 week   Chelsea Banuelos (MD)  Family Medicine  [address] [place] [address]  Phone: [phone]  Fax: [phone]  Follow Up Time: 1-3 days    Chadd Urbano  Hematology/Oncology  [address] [place] [address]  Phone: [phone]  Fax: [phone]  Follow Up Time: 1-3 days    Vincent Morin (DO)  Cardiovascular Disease; Nuclear Cardiology  [address] [place] [address]  Phone: [phone]  Fax: [phone]  Follow Up Time: 2 weeks    Patricia Acosta (DO)  Nephrology  [address] [place] [address] [address]  [place] [address]  Phone: [phone]  Fax: [phone]  Follow Up Time: 1 week

## 2023-05-13 NOTE — DISCHARGE NOTE PROVIDER - CARE PROVIDERS DIRECT ADDRESSES
,DirectAddress_Unknown,DirectAddress_Unknown ,DirectAddress_Unknown,DirectAddress_Unknown,[email or web address] ,DirectAddress_Unknown,DirectAddress_Unknown,[email or web address],DirectAddress_Unknown

## 2023-05-13 NOTE — DISCHARGE NOTE PROVIDER - PROVIDER TOKENS
PROVIDER:[TOKEN:[[id number]]],PROVIDER:[TOKEN:[[id number]]] PROVIDER:[TOKEN:[[id number]]],PROVIDER:[TOKEN:[[id number]]],PROVIDER:[TOKEN:[[id number]],FOLLOWUP:[2 weeks]] PROVIDER:[TOKEN:[[id number]]],PROVIDER:[TOKEN:[[id number]]],PROVIDER:[TOKEN:[[id number]],FOLLOWUP:[2 weeks]],PROVIDER:[TOKEN:[[id number]],FOLLOWUP:[1 week]] PROVIDER:[TOKEN:[[id number]],FOLLOWUP:[1-3 days]],PROVIDER:[TOKEN:[[id number]],FOLLOWUP:[1-3 days]],PROVIDER:[TOKEN:[[id number]],FOLLOWUP:[2 weeks]],PROVIDER:[TOKEN:[[id number]],FOLLOWUP:[1 week]]

## 2023-05-13 NOTE — DISCHARGE NOTE PROVIDER - NSDCCPCAREPLAN_GEN_ALL_CORE_FT
PRINCIPAL DISCHARGE DIAGNOSIS  Diagnosis: Pulmonary embolism  Assessment and Plan of Treatment: Take your anticoagulation (lovenox)  as directed.  Follow up with your health care provider within one week. Call for appointment.  If you develop shortness of breath or if your shortness of breath worsens call your Health Care Provider or go to the Emergency Department.      SECONDARY DISCHARGE DIAGNOSES  Diagnosis: Prostate cancer  Assessment and Plan of Treatment: Take tamsulosin as prescribed   [place] up with [place]. U[place]ano [place] [facility] [place]    Diagnosis: Hyponatremia  Assessment and Plan of Treatment: HOME CARE INSTRUCTIONS  Only take medicines as directed by your caregiver. Many medicines can make hyponatremia worse. Discuss all your medicines with your caregiver.  Carefully follow any recommended diet, including any fluid restrictions.  You may be asked to repeat lab tests. Follow these directions.  Avoid alcohol and recreational drugs.  SEEK MEDICAL CARE IF:  You develop worsening nausea, fatigue, headache, confusion, or weakness.  Your original hyponatremia symptoms return.  You have problems following the recommended diet.   SEEK IMMEDIATE MEDICAL CARE IF:  You have a seizure.  You faint.  You have ongoing diarrhea or vomiting      Diagnosis: Cholangiocarcinoma  Assessment and Plan of Treatment: Follow up with Dr. Rojas at [facility] [place]     PRINCIPAL DISCHARGE DIAGNOSIS  Diagnosis: Pulmonary embolism  Assessment and Plan of Treatment: You were admitted with clot in both lungs   -  CT angiogram  showed multiple segmental and subsegmental pulmonary emboli w/ R heart strain in setting of malignancy   - you were given Heparin intravenously - transitioned to Lovenox which you injected once daily.  Follow instructions that was taught regarding administration  Monitor for any signs of bleeding and avoid injury while on this medication  If any bleeding persistent and symptomatic notify your doctor immediately.  Follow up with your health care provider within one week. Call for appointment.  If you develop shortness of breath or if your shortness of breath worsens call your Health Care Provider or go to the Emergency Department.  Follow upwith vascular cardiology - Dr. Morin in 1-2 weeks      SECONDARY DISCHARGE DIAGNOSES  Diagnosis: DVT, lower extremity  Assessment and Plan of Treatment: Ultrasound of your legs showed clots - RIGHT: DVT in right soleal vein and tibial peroneal trunk and Acute DVT is seen in the tibial peroneal trunk.  - You were given Heparin intravenously - transitioned to Lovenox which you injected once daily.  Follow instructions that was taught regarding administration  Monitor for any signs of bleeding and avoid injury while on this medication  If any bleeding persistent and symptomatic notify your doctor immediately.  Follow up with your health care provider within one week. Call for appointment.  Walking is encouraged, increase activity as tolerated.  If you develop new leg pain, swelling, and/or redness contact your healthcare provider.  If you develop new chest pain with difficulty breathing, a rapid heart rate and/or a feeling of passing out call emergency medical services 911.      Diagnosis: Hyponatremia  Assessment and Plan of Treatment: Your sodium level was low  [facility]  - You were treated with  Ure-Na 15G po q6h x 2 doses. and Samsca x3.   -Improving up to 129 on 5/13  Follow up with Renal - Dr. Acosta 1 week  HOME CARE INSTRUCTIONS  Only take medicines as directed by your caregiver. Many medicines can make hyponatremia worse. Discuss all your medicines with your caregiver.  Carefully follow any recommended diet, including any fluid restrictions.  You may be asked to repeat lab tests. Follow these directions.  Avoid alcohol and recreational drugs.  SEEK MEDICAL CARE IF:  You develop worsening nausea, fatigue, headache, confusion, or weakness.  Your original hyponatremia symptoms return.  You have problems following the recommended diet.   SEEK IMMEDIATE MEDICAL CARE IF:  You have a seizure.  You faint.  You have ongoing diarrhea or vomiting      Diagnosis: Ascites  Assessment and Plan of Treatment: You had  abdominal distention adn discomfort.  - UltraSound Abdomen on 5/8 Moderate to severe four quadrant ascites.  - status post tap on 5/9 with removal of 4 L  - Repeat - UltraSound Abdomen on 5/14 showed moderate Ascites  - You had the fluid drained on 5/15/23  - Follow up with Dr. Urbano, [facility].    Diagnosis: Cholangiocarcinoma  Assessment and Plan of Treatment: Follow up with Dr. Rojas at [facility] [place]      Diagnosis: Prostate cancer  Assessment and Plan of Treatment: Take tamsulosin as prescribed   Follow up with Dr. Urbano at [facility] [place]    Diagnosis: Anemia  Assessment and Plan of Treatment: Hemoglobin stable  -likely due to recent chemotherapy     PRINCIPAL DISCHARGE DIAGNOSIS  Diagnosis: Pulmonary embolism  Assessment and Plan of Treatment: You were admitted with clot in both lungs   -  CT angiogram  showed multiple segmental and subsegmental pulmonary emboli w/ R heart strain in setting of malignancy   - you were given Heparin intravenously - transitioned to Lovenox which you injected once daily.  Follow instructions that was taught regarding administration  Monitor for any signs of bleeding and avoid injury while on this medication  If any bleeding persistent and symptomatic notify your doctor immediately.  Follow up with your health care provider within one week. Call for appointment.  If you develop shortness of breath or if your shortness of breath worsens call your Health Care Provider or go to the Emergency Department.  Follow upwith vascular cardiology - Dr. Morin in 1-2 weeks      SECONDARY DISCHARGE DIAGNOSES  Diagnosis: DVT, lower extremity  Assessment and Plan of Treatment: Ultrasound of your legs showed clots - RIGHT: DVT in right soleal vein and tibial peroneal trunk and Acute DVT is seen in the tibial peroneal trunk.  - You were given Heparin intravenously - transitioned to Lovenox which you injected once daily.  Follow instructions that was taught regarding administration  Monitor for any signs of bleeding and avoid injury while on this medication  If any bleeding persistent and symptomatic notify your doctor immediately.  Follow up with your health care provider within one week. Call for appointment.  Walking is encouraged, increase activity as tolerated.  If you develop new leg pain, swelling, and/or redness contact your healthcare provider.  If you develop new chest pain with difficulty breathing, a rapid heart rate and/or a feeling of passing out call emergency medical services 911.      Diagnosis: Hyponatremia  Assessment and Plan of Treatment: Your sodium level was low  -[facility]  - You were treated with  Ure-Na 15G po q6h x 2 doses. and Samsca x3.   -Improving up to 129 on 5/15  Follow up with Renal - Dr. Acosta 1 week  HOME CARE INSTRUCTIONS  Only take medicines as directed by your caregiver. Many medicines can make hyponatremia worse. Discuss all your medicines with your caregiver.  Carefully follow any recommended diet, including any fluid restrictions.  You may be asked to repeat lab tests. Follow these directions.  Avoid alcohol and recreational drugs.  SEEK MEDICAL CARE IF:  You develop worsening nausea, fatigue, headache, confusion, or weakness.  Your original hyponatremia symptoms return.  You have problems following the recommended diet.   SEEK IMMEDIATE MEDICAL CARE IF:  You have a seizure.  You faint.  You have ongoing diarrhea or vomiting      Diagnosis: Ascites  Assessment and Plan of Treatment: You had  abdominal distention adn discomfort.  - UltraSound Abdomen on 5/8 Moderate to severe four quadrant ascites.  - status post tap on 5/9 with removal of 4 L  - Repeat - UltraSound Abdomen on 5/14 showed moderate Ascites  - You had the fluid drained on 5/15/23 - 2.7L removed.  - Follow up with Dr. Urbano, [facility].    Diagnosis: Cholangiocarcinoma  Assessment and Plan of Treatment: Follow up with Dr. Rojas at [facility] [place]      Diagnosis: Prostate cancer  Assessment and Plan of Treatment: Take tamsulosin as prescribed   Follow up with Dr. Urbano at [facility] [place]    Diagnosis: Anemia  Assessment and Plan of Treatment: Hemoglobin stable  -likely due to recent chemotherapy     PRINCIPAL DISCHARGE DIAGNOSIS  Diagnosis: Pulmonary embolism  Assessment and Plan of Treatment: You were admitted with clot in both lungs   -  CT angiogram  showed multiple segmental and subsegmental pulmonary emboli w/ R heart strain in setting of malignancy   - you were given Heparin intravenously - transitioned to Lovenox which you injected once daily.  Follow instructions that was taught regarding administration  Monitor for any signs of bleeding and avoid injury while on this medication  If any bleeding persistent and symptomatic notify your doctor immediately.  Follow up with your health care provider within one week. Call for appointment.  If you develop shortness of breath or if your shortness of breath worsens call your Health Care Provider or go to the Emergency Department.  Follow upwith vascular cardiology - Dr. Morin in 1-2 weeks      SECONDARY DISCHARGE DIAGNOSES  Diagnosis: DVT, lower extremity  Assessment and Plan of Treatment: Ultrasound of your legs showed clots - RIGHT: DVT in right soleal vein and tibial peroneal trunk and Acute DVT is seen in the tibial peroneal trunk.  - You were given Heparin intravenously - transitioned to Lovenox which you injected once daily.  Follow instructions that was taught regarding administration  Monitor for any signs of bleeding and avoid injury while on this medication  If any bleeding persistent and symptomatic notify your doctor immediately.  Follow up with your health care provider within one week. Call for appointment.  Walking is encouraged, increase activity as tolerated.  If you develop new leg pain, swelling, and/or redness contact your healthcare provider.  If you develop new chest pain with difficulty breathing, a rapid heart rate and/or a feeling of passing out call emergency medical services 911.      Diagnosis: Hyponatremia  Assessment and Plan of Treatment: Your sodium level was low  -[facility]  - You were treated with  Ure-Na 15G po q6h x 2 doses. and Samsca x3.   -Improving up to 129 on 5/15  - Take Lasix 20 mg daily per renal - Dr. Acosta  Follow up with Renal - Dr. Acosta 1 week  HOME CARE INSTRUCTIONS  Only take medicines as directed by your caregiver. Many medicines can make hyponatremia worse. Discuss all your medicines with your caregiver.  Carefully follow any recommended diet, including any fluid restrictions.  You may be asked to repeat lab tests. Follow these directions.  Avoid alcohol and recreational drugs.  SEEK MEDICAL CARE IF:  You develop worsening nausea, fatigue, headache, confusion, or weakness.  Your original hyponatremia symptoms return.  You have problems following the recommended diet.   SEEK IMMEDIATE MEDICAL CARE IF:  You have a seizure.  You faint.  You have ongoing diarrhea or vomiting      Diagnosis: Ascites  Assessment and Plan of Treatment: You had  abdominal distention adn discomfort.  - UltraSound Abdomen on 5/8 Moderate to severe four quadrant ascites.  - status post tap on 5/9 with removal of 4 L  - Repeat - UltraSound Abdomen on 5/14 showed moderate Ascites  - You had the fluid drained on 5/15/23 - 2.7L removed.  - Follow up with Dr. Urbano, [facility].    Diagnosis: Cholangiocarcinoma  Assessment and Plan of Treatment: Follow up with Dr. Rojas at [facility] [place]      Diagnosis: Prostate cancer  Assessment and Plan of Treatment: Take tamsulosin as prescribed   Follow up with Dr. Urbano at [facility] [place]    Diagnosis: Anemia  Assessment and Plan of Treatment: Hemoglobin stable  -likely due to recent chemotherapy

## 2023-05-13 NOTE — DISCHARGE NOTE PROVIDER - NSDCFUADDAPPT_GEN_ALL_CORE_FT
APPTS ARE READY TO BE MADE: [x] YES    Best Family or Patient Contact (if needed):      1: Follow up with PCP Dr. Chelsea Banuelos within 1 week of discharge   2: Follow up with Oncologist Dr. Urbano at [facility] [place] within 1-2 weeks of discharge        APPTS ARE READY TO BE MADE: [x] YES    Best Family or Patient Contact (if needed):      1: Follow up with PCP Dr. Chelsea Banuelos within 1 week of discharge   2: Follow up with Oncologist Dr. Urbano at [facility] [place] within 1-2 weeks of discharge   3: Follow up with vascular cardiology - Dr. Morin in 2 weeks     APPTS ARE READY TO BE MADE: [x] YES    Best Family or Patient Contact (if needed):      1: Follow up with PCP Dr. Chelsea Banuelos within 2-3 of discharge   2: Follow up with Oncologist Dr. Urbano at [facility] [place] within 2 days of discharge   3: Follow up with vascular cardiology - Dr. Morin in 2 weeks     APPTS ARE READY TO BE MADE: [x] YES    Best Family or Patient Contact (if needed):      1: Follow up with PCP Dr. Chelsea Banuelos within 2-3 of discharge   2: Follow up with Oncologist Dr. Urbano at [facility] [place] within 2 days of discharge   3: Follow up with vascular cardiology - Dr. Morin in 2 weeks    Patient's son stated that no assistance is needed for pt at this time.

## 2023-05-13 NOTE — DISCHARGE NOTE PROVIDER - NSDCMRMEDTOKEN_GEN_ALL_CORE_FT
Flomax 0.4 mg oral capsule: 1 cap(s) orally 2 times a day  Lovenox 100 mg/mL injectable solution: 100 milligram(s) subcutaneously once a day   Flomax 0.4 mg oral capsule: 1 cap(s) orally 2 times a day  Lasix 20 mg oral tablet: 1 tab(s) orally once a day  Lovenox 100 mg/mL injectable solution: 100 milligram(s) subcutaneously once a day

## 2023-05-14 LAB
ANION GAP SERPL CALC-SCNC: 14 MMOL/L — SIGNIFICANT CHANGE UP (ref 5–17)
BUN SERPL-MCNC: 31 MG/DL — HIGH (ref 7–23)
CALCIUM SERPL-MCNC: 8.7 MG/DL — SIGNIFICANT CHANGE UP (ref 8.4–10.5)
CHLORIDE SERPL-SCNC: 93 MMOL/L — LOW (ref 96–108)
CO2 SERPL-SCNC: 22 MMOL/L — SIGNIFICANT CHANGE UP (ref 22–31)
CREAT SERPL-MCNC: 1.1 MG/DL — SIGNIFICANT CHANGE UP (ref 0.5–1.3)
CULTURE RESULTS: SIGNIFICANT CHANGE UP
EGFR: 66 ML/MIN/1.73M2 — SIGNIFICANT CHANGE UP
GLUCOSE SERPL-MCNC: 123 MG/DL — HIGH (ref 70–99)
HCT VFR BLD CALC: 35.9 % — LOW (ref 39–50)
HGB BLD-MCNC: 11.8 G/DL — LOW (ref 13–17)
MCHC RBC-ENTMCNC: 30.3 PG — SIGNIFICANT CHANGE UP (ref 27–34)
MCHC RBC-ENTMCNC: 32.9 GM/DL — SIGNIFICANT CHANGE UP (ref 32–36)
MCV RBC AUTO: 92.3 FL — SIGNIFICANT CHANGE UP (ref 80–100)
NRBC # BLD: 0 /100 WBCS — SIGNIFICANT CHANGE UP (ref 0–0)
PLATELET # BLD AUTO: 337 K/UL — SIGNIFICANT CHANGE UP (ref 150–400)
POTASSIUM SERPL-MCNC: 5 MMOL/L — SIGNIFICANT CHANGE UP (ref 3.5–5.3)
POTASSIUM SERPL-MCNC: 5.5 MMOL/L — HIGH (ref 3.5–5.3)
POTASSIUM SERPL-SCNC: 5 MMOL/L — SIGNIFICANT CHANGE UP (ref 3.5–5.3)
POTASSIUM SERPL-SCNC: 5.5 MMOL/L — HIGH (ref 3.5–5.3)
RBC # BLD: 3.89 M/UL — LOW (ref 4.2–5.8)
RBC # FLD: 18.8 % — HIGH (ref 10.3–14.5)
SODIUM SERPL-SCNC: 129 MMOL/L — LOW (ref 135–145)
SPECIMEN SOURCE: SIGNIFICANT CHANGE UP
WBC # BLD: 9.39 K/UL — SIGNIFICANT CHANGE UP (ref 3.8–10.5)
WBC # FLD AUTO: 9.39 K/UL — SIGNIFICANT CHANGE UP (ref 3.8–10.5)

## 2023-05-14 PROCEDURE — 76705 ECHO EXAM OF ABDOMEN: CPT | Mod: 26

## 2023-05-14 RX ADMIN — TAMSULOSIN HYDROCHLORIDE 0.4 MILLIGRAM(S): 0.4 CAPSULE ORAL at 11:17

## 2023-05-14 RX ADMIN — CHLORHEXIDINE GLUCONATE 1 APPLICATION(S): 213 SOLUTION TOPICAL at 11:18

## 2023-05-14 RX ADMIN — ENOXAPARIN SODIUM 100 MILLIGRAM(S): 100 INJECTION SUBCUTANEOUS at 00:08

## 2023-05-14 NOTE — PROGRESS NOTE ADULT - PROBLEM SELECTOR PLAN 2
likely sec to fluid overload   fluid restrict   f/u sodium closely  renal f/u reviewed  as per renal, suspect hyponatremia in setting of volume overload, high urine osmo, indicating SIADH as well.  1L fluid restriction.   Ure-Na 15G po q6h x 2 doses.
likely sec to fluid overload   fluid restrict   f/u sodium closely  renal f/u reviewed  as per renal, suspect hyponatremia in setting of volume overload, high urine osmo, indicating SIADH as well.  1L fluid restriction.   Ure-Na 15G po q6h x 2 doses.  ascites - s/p tap
likely sec to fluid overload   fluid restrict   f/u sodium closely
likely sec to fluid overload   fluid restrict   f/u sodium closely  renal f/u reviewed  as per renal, suspect hyponatremia in setting of volume overload, high urine osmo, indicating SIADH as well.  1L fluid restriction.   Ure-Na 15G po q6h x 2 doses.
likely sec to fluid overload   fluid restrict   f/u sodium closely
likely sec to fluid overload   fluid restrict   f/u sodium closely  renal f/u reviewed  as per renal, suspect hyponatremia in setting of volume overload, high urine osmo, indicating SIADH as well.  1L fluid restriction.   Ure-Na 15G po q6h x 2 doses.  ascites - s/p tap
likely sec to fluid overload   fluid restrict   f/u sodium closely
likely sec to fluid overload   fluid restrict   f/u sodium closely  renal f/u reviewed  as per renal, suspect hyponatremia in setting of volume overload, high urine osmo, indicating SIADH as well.  1L fluid restriction  s/p urena  ascites - s/p tap  improving   abd sono and poss dc if sono with min fluid
likely sec to fluid overload   fluid restrict   f/u sodium closely
likely sec to fluid overload   fluid restrict   f/u sodium closely  renal f/u reviewed  as per renal, suspect hyponatremia in setting of volume overload, high urine osmo, indicating SIADH as well.  1L fluid restriction.   Ure-Na 15G po q6h x 2 doses.  ascites - s/p tap
likely sec to fluid overload   fluid restrict   f/u sodium closely  renal f/u reviewed  as per renal, suspect hyponatremia in setting of volume overload, high urine osmo, indicating SIADH as well.  1L fluid restriction.   Ure-Na 15G po q6h x 2 doses.  ascites - s/p tap

## 2023-05-14 NOTE — PROGRESS NOTE ADULT - ASSESSMENT
LENA BATISTA is a 87y Male who presents with a chief complaint of VTE    Metastatic Cholangiocarcinoma  ·	Patient follows with Dr. Urbano, [facility].  ·	Last treated with 5-FU on April 26th.  ·	No systemic therapy while inpatient  ·	Paracentesis as needed for comfort. Family is wondering whether another one can be done prior to discharge.    Venous Thromboembolism  ·	Patient is on enoxaparin.  ·	Anti-Xa level appears to be in therapeutic range.    Hyponatremia   - sodium on 10/13 129  - follow up nephro reccs     Low appettie   - likely secondary to underlying malignancy       Carson Hickey MD  HematologyOncology   O: [phone]

## 2023-05-14 NOTE — PROGRESS NOTE ADULT - PROBLEM SELECTOR PROBLEM 3
Cholangiocarcinoma

## 2023-05-14 NOTE — PROGRESS NOTE ADULT - PROBLEM SELECTOR PROBLEM 4
Prostate cancer

## 2023-05-14 NOTE — PROGRESS NOTE ADULT - PROBLEM SELECTOR PLAN 3
--w/ metastatic liver disease   --under the care of Dr Chadd Pagan of [facility] [place]  --previously on [place]/ Cis (LD 4/12) +Durvalumab (LD 4/05),  most recently treated w/ Fluorouracil (LD 4/26)   --no systemic treatment while inpatient
--w/ metastatic liver disease   --under the care of Dr Chadd Pagan of [facility] [place]  --previously on [place]/ Cis (LD 4/12) +Durvalumab (LD 4/05),  most recently treated w/ Fluorouracil (LD 4/26)   --no systemic treatment while inpatient  --ongoing care after discharge
--w/ metastatic liver disease   --under the care of Dr Chadd Pagan of [facility] [place]  --previously on [place]/ Cis (LD 4/12) +Durvalumab (LD 4/05),  most recently treated w/ Fluorouracil (LD 4/26)   --no systemic treatment while inpatient
--w/ metastatic liver disease   --under the care of Dr Chadd Pagan of [facility] [place]  --previously on [place]/ Cis (LD 4/12) +Durvalumab (LD 4/05),  most recently treated w/ Fluorouracil (LD 4/26)   --no systemic treatment while inpatient  --ongoing care after discharge
--w/ metastatic liver disease   --under the [place] of [place] [facility] [place]  --previously on [place]/ Cis (LD 4/12) +Durvalumab (LD 4/05),  most recently treated w/ Fluorouracil (LD 4/26)   --no systemic treatment while inpatient
--w/ metastatic liver disease   --under the care of Dr Chadd P[place] of [facility] [place]  --previously on [place]/ Cis (LD 4/12) +Durvalumab (LD 4/05),  most recently treated w/ Fluorouracil (LD 4/26)   --no systemic treatment while inpatient
--w/ metastatic liver disease   --under [place] [facility] [place]  [place]previously on [place]/ Cis (LD 4/12) +Durvalumab (LD 4/05),  most recently treated w/ Fluorouracil (LD 4/26)   --no systemic treatment while inpatient  --ongoing care after discharge
--w/ metastatic liver disease   --under the care of Dr Chadd Pagan of [facility] [place]  --previously on [place]/ Cis (LD 4/12) +Durvalumab (LD 4/05),  most recently treated w/ Fluorouracil (LD 4/26)   --no systemic treatment while inpatient  --ongoing care after discharge
--w/ metastatic liver disease   --under the care of Dr Chadd Pagan of [facility] [place]  --previously on [place]/ Cis (LD 4/12) +Durvalumab (LD 4/05),  most recently treated w/ Fluorouracil (LD 4/26)   --no systemic treatment while inpatient
--w/ metastatic liver disease   --under the care of Dr Chadd Pagan of [facility] [place]  --previously on [place]/ Cis (LD 4/12) +Durvalumab (LD 4/05),  most recently treated w/ Fluorouracil (LD 4/26)   --no systemic treatment while inpatient
--w/ metastatic liver disease   --under the care of Dr Chadd Pagan of [facility] [place]  --previously on [place]/ Cis (LD 4/12) +Durvalumab (LD 4/05),  most recently treated w/ Fluorouracil (LD 4/26)   --no systemic treatment while inpatient

## 2023-05-14 NOTE — PROGRESS NOTE ADULT - NUTRITIONAL ASSESSMENT
This patient has been assessed with a concern for Malnutrition and has been determined to have a diagnosis/diagnoses of Moderate protein-calorie malnutrition.    This patient is being managed with:   Diet Regular-  Entered: May  4 2023 12:52PM  
This patient has been assessed with a concern for Malnutrition and has been determined to have a diagnosis/diagnoses of Moderate protein-calorie malnutrition.    This patient is being managed with:   Diet Regular-  Supplement Feeding Modality:  Oral  Ensure Enlive Cans or Servings Per Day:  1       Frequency:  Daily  Entered: May 11 2023  2:58PM  
This patient has been assessed with a concern for Malnutrition and has been determined to have a diagnosis/diagnoses of Moderate protein-calorie malnutrition.    This patient is being managed with:   Diet Regular-  Supplement Feeding Modality:  Oral  Ensure Enlive Cans or Servings Per Day:  1       Frequency:  Daily  Entered: May 11 2023  2:58PM  
This patient has been assessed with a concern for Malnutrition and has been determined to have a diagnosis/diagnoses of Moderate protein-calorie malnutrition.    This patient is being managed with:   Diet Regular-  1000mL Fluid Restriction ([id number])  Entered: May  9 2023  6:32PM  
This patient has been assessed with a concern for Malnutrition and has been determined to have a diagnosis/diagnoses of Moderate protein-calorie malnutrition.    This patient is being managed with:   Diet Regular-  1000mL Fluid Restriction ([id number])  Entered: May  9 2023  6:32PM  
This patient has been assessed with a concern for Malnutrition and has been determined to have a diagnosis/diagnoses of Moderate protein-calorie malnutrition.    This patient is being managed with:   Diet Regular-  Supplement Feeding Modality:  Oral  Ensure Enlive Cans or Servings Per Day:  1       Frequency:  Daily  Entered: May 11 2023  2:58PM  
This patient has been assessed with a concern for Malnutrition and has been determined to have a diagnosis/diagnoses of Moderate protein-calorie malnutrition.    This patient is being managed with:   Diet Regular-  Entered: May  4 2023 12:52PM    This patient has been assessed with a concern for Malnutrition and has been determined to have a diagnosis/diagnoses of Moderate protein-calorie malnutrition.    This patient is being managed with:   Diet Regular-  Entered: May  4 2023 12:52PM  
This patient has been assessed with a concern for Malnutrition and has been determined to have a diagnosis/diagnoses of Moderate protein-calorie malnutrition.    This patient is being managed with:   Diet Regular-  1500mL Fluid Restriction ([id number])  Entered: May  7 2023  6:39PM  
This patient has been assessed with a concern for Malnutrition and has been determined to have a diagnosis/diagnoses of Moderate protein-calorie malnutrition.    This patient is being managed with:   Diet Regular-  Supplement Feeding Modality:  Oral  Ensure Enlive Cans or Servings Per Day:  1       Frequency:  Daily  Entered: May 11 2023  2:58PM  
This patient has been assessed with a concern for Malnutrition and has been determined to have a diagnosis/diagnoses of Moderate protein-calorie malnutrition.    This patient is being managed with:   Diet Regular-  1500mL Fluid Restriction ([id number])  Entered: May  7 2023  6:39PM

## 2023-05-14 NOTE — PROGRESS NOTE ADULT - PROBLEM SELECTOR PLAN 1
was on heparin gtt - changed to lovenox  appreciate vascular input
started on heparin gtt - changed to lovenox  appreciate vascular input
was on heparin gtt - changed to lovenox  appreciate vascular input

## 2023-05-14 NOTE — PROGRESS NOTE ADULT - PROBLEM SELECTOR PLAN 5
had extensive lengthy discussion with pts daughter about pts current clinical status , management plan, and guarded prognosis ( as per heme onc - )

## 2023-05-15 ENCOUNTER — TRANSCRIPTION ENCOUNTER (OUTPATIENT)
Age: 88
End: 2023-05-15

## 2023-05-15 VITALS
OXYGEN SATURATION: 95 % | TEMPERATURE: 98 F | SYSTOLIC BLOOD PRESSURE: 108 MMHG | DIASTOLIC BLOOD PRESSURE: 67 MMHG | RESPIRATION RATE: 17 BRPM | HEART RATE: 100 BPM

## 2023-05-15 LAB
ALBUMIN FLD-MCNC: 1.7 G/DL — SIGNIFICANT CHANGE UP
ANION GAP SERPL CALC-SCNC: 7 MMOL/L — SIGNIFICANT CHANGE UP (ref 5–17)
B PERT IGG+IGM PNL SER: ABNORMAL
BUN SERPL-MCNC: 32 MG/DL — HIGH (ref 7–23)
CALCIUM SERPL-MCNC: 7.9 MG/DL — LOW (ref 8.4–10.5)
CHLORIDE SERPL-SCNC: 96 MMOL/L — SIGNIFICANT CHANGE UP (ref 96–108)
CO2 SERPL-SCNC: 26 MMOL/L — SIGNIFICANT CHANGE UP (ref 22–31)
COLOR FLD: SIGNIFICANT CHANGE UP
CREAT SERPL-MCNC: 1.02 MG/DL — SIGNIFICANT CHANGE UP (ref 0.5–1.3)
EGFR: 72 ML/MIN/1.73M2 — SIGNIFICANT CHANGE UP
FLUID INTAKE SUBSTANCE CLASS: SIGNIFICANT CHANGE UP
GLUCOSE FLD-MCNC: 64 MG/DL — SIGNIFICANT CHANGE UP
GLUCOSE SERPL-MCNC: 109 MG/DL — HIGH (ref 70–99)
LDH SERPL L TO P-CCNC: 966 U/L — SIGNIFICANT CHANGE UP
LYMPHOCYTES # FLD: 73 % — SIGNIFICANT CHANGE UP
MESOTHL CELL # FLD: 8 % — SIGNIFICANT CHANGE UP
MONOS+MACROS # FLD: 18 % — SIGNIFICANT CHANGE UP
NEUTROPHILS-BODY FLUID: 1 % — SIGNIFICANT CHANGE UP
POTASSIUM SERPL-MCNC: 4.8 MMOL/L — SIGNIFICANT CHANGE UP (ref 3.5–5.3)
POTASSIUM SERPL-SCNC: 4.8 MMOL/L — SIGNIFICANT CHANGE UP (ref 3.5–5.3)
PROT FLD-MCNC: 3.4 G/DL — SIGNIFICANT CHANGE UP
RCV VOL RI: HIGH /UL (ref 0–0)
SODIUM SERPL-SCNC: 129 MMOL/L — LOW (ref 135–145)
TOTAL NUCLEATED CELL COUNT, BODY FLUID: 473 /UL — SIGNIFICANT CHANGE UP
TUBE TYPE: SIGNIFICANT CHANGE UP

## 2023-05-15 PROCEDURE — 88342 IMHCHEM/IMCYTCHM 1ST ANTB: CPT

## 2023-05-15 PROCEDURE — 84157 ASSAY OF PROTEIN OTHER: CPT

## 2023-05-15 PROCEDURE — 36415 COLL VENOUS BLD VENIPUNCTURE: CPT

## 2023-05-15 PROCEDURE — 85610 PROTHROMBIN TIME: CPT

## 2023-05-15 PROCEDURE — 85520 HEPARIN ASSAY: CPT

## 2023-05-15 PROCEDURE — 84484 ASSAY OF TROPONIN QUANT: CPT

## 2023-05-15 PROCEDURE — 0225U NFCT DS DNA&RNA 21 SARSCOV2: CPT

## 2023-05-15 PROCEDURE — 83880 ASSAY OF NATRIURETIC PEPTIDE: CPT

## 2023-05-15 PROCEDURE — 49083 ABD PARACENTESIS W/IMAGING: CPT

## 2023-05-15 PROCEDURE — 88305 TISSUE EXAM BY PATHOLOGIST: CPT

## 2023-05-15 PROCEDURE — 83615 LACTATE (LD) (LDH) ENZYME: CPT

## 2023-05-15 PROCEDURE — 80048 BASIC METABOLIC PNL TOTAL CA: CPT

## 2023-05-15 PROCEDURE — 85025 COMPLETE CBC W/AUTO DIFF WBC: CPT

## 2023-05-15 PROCEDURE — 85018 HEMOGLOBIN: CPT

## 2023-05-15 PROCEDURE — 85027 COMPLETE CBC AUTOMATED: CPT

## 2023-05-15 PROCEDURE — 93970 EXTREMITY STUDY: CPT

## 2023-05-15 PROCEDURE — 80053 COMPREHEN METABOLIC PANEL: CPT

## 2023-05-15 PROCEDURE — 87641 MR-STAPH DNA AMP PROBE: CPT

## 2023-05-15 PROCEDURE — 76705 ECHO EXAM OF ABDOMEN: CPT

## 2023-05-15 PROCEDURE — C8929: CPT

## 2023-05-15 PROCEDURE — 84132 ASSAY OF SERUM POTASSIUM: CPT

## 2023-05-15 PROCEDURE — 87205 SMEAR GRAM STAIN: CPT

## 2023-05-15 PROCEDURE — 99285 EMERGENCY DEPT VISIT HI MDM: CPT | Mod: 25

## 2023-05-15 PROCEDURE — 82330 ASSAY OF CALCIUM: CPT

## 2023-05-15 PROCEDURE — 82803 BLOOD GASES ANY COMBINATION: CPT

## 2023-05-15 PROCEDURE — 83605 ASSAY OF LACTIC ACID: CPT

## 2023-05-15 PROCEDURE — 83036 HEMOGLOBIN GLYCOSYLATED A1C: CPT

## 2023-05-15 PROCEDURE — 82435 ASSAY OF BLOOD CHLORIDE: CPT

## 2023-05-15 PROCEDURE — 87102 FUNGUS ISOLATION CULTURE: CPT

## 2023-05-15 PROCEDURE — 85014 HEMATOCRIT: CPT

## 2023-05-15 PROCEDURE — 82962 GLUCOSE BLOOD TEST: CPT

## 2023-05-15 PROCEDURE — 83930 ASSAY OF BLOOD OSMOLALITY: CPT

## 2023-05-15 PROCEDURE — C1729: CPT

## 2023-05-15 PROCEDURE — 88112 CYTOPATH CELL ENHANCE TECH: CPT

## 2023-05-15 PROCEDURE — 87075 CULTR BACTERIA EXCEPT BLOOD: CPT

## 2023-05-15 PROCEDURE — 83935 ASSAY OF URINE OSMOLALITY: CPT

## 2023-05-15 PROCEDURE — 82945 GLUCOSE OTHER FLUID: CPT

## 2023-05-15 PROCEDURE — 84550 ASSAY OF BLOOD/URIC ACID: CPT

## 2023-05-15 PROCEDURE — 85730 THROMBOPLASTIN TIME PARTIAL: CPT

## 2023-05-15 PROCEDURE — 97161 PT EVAL LOW COMPLEX 20 MIN: CPT

## 2023-05-15 PROCEDURE — 84300 ASSAY OF URINE SODIUM: CPT

## 2023-05-15 PROCEDURE — 84133 ASSAY OF URINE POTASSIUM: CPT

## 2023-05-15 PROCEDURE — 89051 BODY FLUID CELL COUNT: CPT

## 2023-05-15 PROCEDURE — 87070 CULTURE OTHR SPECIMN AEROBIC: CPT

## 2023-05-15 PROCEDURE — 97116 GAIT TRAINING THERAPY: CPT

## 2023-05-15 PROCEDURE — 84295 ASSAY OF SERUM SODIUM: CPT

## 2023-05-15 PROCEDURE — 87640 STAPH A DNA AMP PROBE: CPT

## 2023-05-15 PROCEDURE — 82947 ASSAY GLUCOSE BLOOD QUANT: CPT

## 2023-05-15 PROCEDURE — 88341 IMHCHEM/IMCYTCHM EA ADD ANTB: CPT

## 2023-05-15 PROCEDURE — 97530 THERAPEUTIC ACTIVITIES: CPT

## 2023-05-15 PROCEDURE — 81001 URINALYSIS AUTO W/SCOPE: CPT

## 2023-05-15 PROCEDURE — 71045 X-RAY EXAM CHEST 1 VIEW: CPT

## 2023-05-15 PROCEDURE — 82042 OTHER SOURCE ALBUMIN QUAN EA: CPT

## 2023-05-15 PROCEDURE — 92610 EVALUATE SWALLOWING FUNCTION: CPT

## 2023-05-15 RX ORDER — FUROSEMIDE 40 MG
1 TABLET ORAL
Qty: 30 | Refills: 0
Start: 2023-05-15 | End: 2023-06-13

## 2023-05-15 RX ADMIN — TAMSULOSIN HYDROCHLORIDE 0.4 MILLIGRAM(S): 0.4 CAPSULE ORAL at 11:05

## 2023-05-15 RX ADMIN — CHLORHEXIDINE GLUCONATE 1 APPLICATION(S): 213 SOLUTION TOPICAL at 11:04

## 2023-05-15 RX ADMIN — ENOXAPARIN SODIUM 100 MILLIGRAM(S): 100 INJECTION SUBCUTANEOUS at 00:10

## 2023-05-15 NOTE — PROGRESS NOTE ADULT - NS ATTEND AMEND GEN_ALL_CORE FT
Continue A/C  Nephrology for Sodium  OK with diuretics if needed    AG
Continue a/c as planned  d/w daughter by phone  d/w Dr. Jack Morin
If needed can hold lovenox for paracentesis and then resume once safe from IR perspective    Mikel
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    He is on treatment with lovenox, off oxygen.  Continue treatment for his hyponatremia per nephrology.   Follow up with Dr. Urbano regarding further treatment/goals of care.
Agree with above
As above.    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    #Ongoing goals of care discussion with Dr. Urbano. Long discussion held today with the patient's family.  #Continue enoxaparin for venous thromboembolism.  #Follow-up nephrology recommendations regarding hyponatremia.
Continue lovenox full dose for now  Sodium with Dr. Santiago  Please place compression garments on the legs to help with edema (likely due to ascites)
Patient with stage IV cholangiocarcinoma [place] [facility] [place] Urbano who presents for bilateral pulmonary emboli.    Echo noted, no hemodynamic compromise.  Off oxygen, but tachycardic with ambulation.   Started on subcutaneous lovenox.  Would check anti xa level after 3rd dose.
Seen, examined with, formulated plan with and  agree with above as scribed by NP Sean [Isac]       ascites + extensive LE edema 4+     his dvt b/l are below the knee  also P.E. as well     concern with hyponatremia hypervolemic   attempt Ure-na 15 po q 6 x 2 and re evaluate may need lasix or samsca will evaluate in am again
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    He is on treatment with lovenox.  Continue treatment for his hyponatremia per nephrology- better 129 today.   Discharge planning, follow up with Dr. Urbano regarding further treatment/goals of care.  If he remains admitted, will check an antiXa level in am.
As above.    88 y/o male with metastatic cholangiocarcinoma, admitted with VTE.    No systemic therapy while inpatient. Paracentesis was performed today.    Continue enoxaparin.     Disposition planning.
Seen, examined with, formulated plan with and  agree with above as scribed by NP Sean [Isac]   lungs decrease bs   abd + ascites  ext 2-3 + edema    hyponatremia  edema   ascites    na is at 129 steady   start lasix 20 mg every other day  paracentesis for ascites
pt daughter to speak with primary oncology Dr Urbano regarding goals of care later today.

## 2023-05-15 NOTE — PROGRESS NOTE ADULT - PROVIDER SPECIALTY LIST ADULT
Heme/Onc
Internal Medicine
Nephrology
Vascular Cardiology
Heme/Onc
Nephrology
Vascular Cardiology
Vascular Cardiology
Heme/Onc
Heme/Onc
Vascular Cardiology
Vascular Cardiology
Heme/Onc
Internal Medicine
Nephrology
Nephrology
Internal Medicine

## 2023-05-15 NOTE — DISCHARGE NOTE NURSING/CASE MANAGEMENT/SOCIAL WORK - PATIENT PORTAL LINK FT
You can access the FollowMyHealth Patient Portal offered by [facility] by registering at the following website: [email or web address]. By joining [facility]’s FollowMyHealth portal, you will also be able to view your health information using other applications (apps) compatible with our system.

## 2023-05-15 NOTE — PROGRESS NOTE ADULT - SUBJECTIVE AND OBJECTIVE BOX
SUBJECTIVE / OVERNIGHT EVENTS:pt seen and examined  05-04-23     MEDICATIONS  (STANDING):  chlorhexidine 2% Cloths 1 Application(s) Topical daily  dextrose 5%. 1000 milliLiter(s) (100 mL/Hr) IV Continuous <Continuous>  dextrose 5%. 1000 milliLiter(s) (50 mL/Hr) IV Continuous <Continuous>  dextrose 50% Injectable 25 Gram(s) IV Push once  dextrose 50% Injectable 12.5 Gram(s) IV Push once  dextrose 50% Injectable 25 Gram(s) IV Push once  enoxaparin Injectable 100 milliGRAM(s) SubCutaneous every 24 hours  glucagon  Injectable 1 milliGRAM(s) IntraMuscular once  tamsulosin 0.8 milliGRAM(s) Oral at bedtime    MEDICATIONS  (PRN):  dextrose Oral Gel 15 Gram(s) Oral once PRN Blood Glucose LESS THAN 70 milliGRAM(s)/deciliter    T(C): 36.7 (05-04-23 @ 21:40), Max: 36.8 (05-04-23 @ 17:06)  HR: 100 (05-04-23 @ 21:40) (100 - 109)  BP: 117/69 (05-04-23 @ 21:40) (111/72 - 143/80)  RR: 20 (05-04-23 @ 21:40) (20 - 20)  SpO2: 94% (05-04-23 @ 21:40) (94% - 99%)    CAPILLARY BLOOD GLUCOSE      POCT Blood Glucose.: 90 mg/dL (04 May 2023 11:22)  POCT Blood Glucose.: 101 mg/dL (04 May 2023 02:55)    I&O's Summary    04 May 2023 07:01  -  04 May 2023 23:03  --------------------------------------------------------  IN: 330 mL / OUT: 0 mL / NET: 330 mL    Vital Signs Last 24 Hrs  T(C): 36.7 (05-04-23 @ 21:40), Max: 36.8 (05-04-23 @ 17:06)  T(F): 98.1 (05-04-23 @ 21:40), Max: 98.2 (05-04-23 @ 17:06)  HR: 100 (05-04-23 @ 21:40) (100 - 109)  BP: 117/69 (05-04-23 @ 21:40) (111/72 - 143/80)  BP(mean): --  RR: 20 (05-04-23 @ 21:40) (20 - 20)  SpO2: 94% (05-04-23 @ 21:40) (94% - 99%)        Constitutional: No fever, fatigue  Skin: No rash.  Eyes: No recent vision problems or eye pain.  ENT: No congestion, ear pain, or sore throat.  Cardiovascular: No chest pain or palpation.  Respiratory: No cough, shortness of breath, congestion, or wheezing.  Gastrointestinal: No abdominal pain, nausea, vomiting, or diarrhea.  Genitourinary: No dysuria.  Musculoskeletal: No joint swelling.  Neurologic: No headache.    PHYSICAL EXAM:  GENERAL: NAD  EYES: EOMI, PERRLA  NECK: Supple, No JVD  CHEST/LUNG: dec breath sounds at bases  HEART:  S1 , S2 +  ABDOMEN: soft , bs+  EXTREMITIES:  edema+  NEUROLOGY:alert awake      LABS:                        11.0   9.29  )-----------( 126      ( 04 May 2023 06:12 )             33.7     05-04    127<L>  |  91<L>  |  17  ----------------------------<  97  4.0   |  21<L>  |  0.93    Ca    7.9<L>      04 May 2023 06:12    TPro  5.2<L>  /  Alb  2.6<L>  /  TBili  0.6  /  DBili  x   /  AST  27  /  ALT  16  /  AlkPhos  96  05-03    PT/INR - ( 03 May 2023 16:58 )   PT: 16.3 sec;   INR: 1.40 ratio         PTT - ( 04 May 2023 09:59 )  PTT:64.8 sec      Urinalysis Basic - ( 04 May 2023 06:10 )    Color: Yellow / Appearance: Clear / SG: >1.050 / pH: x  Gluc: x / Ketone: Small  / Bili: Negative / Urobili: Negative   Blood: x / Protein: 30 mg/dL / Nitrite: Negative   Leuk Esterase: Negative / RBC: 3 /hpf / WBC 1 /HPF   Sq Epi: x / Non Sq Epi: x / Bacteria: Negative        RADIOLOGY & ADDITIONAL TESTS:    Imaging Personally Reviewed:yes    Consultant(s) Notes Reviewed:  yes    Care Discussed with Consultants/Other Providers:yes  
    SUBJECTIVE / OVERNIGHT EVENTS:pt seen and examined  05-08-23     MEDICATIONS  (STANDING):  chlorhexidine 2% Cloths 1 Application(s) Topical daily  tamsulosin 0.4 milliGRAM(s) Oral daily    MEDICATIONS  (PRN):    Vital Signs Last 24 Hrs  T(C): 37 (05-08-23 @ 21:21), Max: 37 (05-08-23 @ 21:21)  T(F): 98.6 (05-08-23 @ 21:21), Max: 98.6 (05-08-23 @ 21:21)  HR: 108 (05-08-23 @ 21:21) (96 - 109)  BP: 116/80 (05-08-23 @ 21:21) (109/68 - 125/86)  BP(mean): --  RR: 18 (05-08-23 @ 21:21) (18 - 18)  SpO2: 94% (05-08-23 @ 21:21) (92% - 98%)      Constitutional: No fever, fatigue  Skin: No rash.  Eyes: No recent vision problems or eye pain.  ENT: No congestion, ear pain, or sore throat.  Cardiovascular: No chest pain or palpation.  Respiratory: No cough, shortness of breath, congestion, or wheezing.  Gastrointestinal: No abdominal pain, nausea, vomiting, or diarrhea.  Genitourinary: No dysuria.  Musculoskeletal: No joint swelling.  Neurologic: No headache.    PHYSICAL EXAM:  GENERAL: NAD  EYES: EOMI, PERRLA  NECK: Supple, No JVD  CHEST/LUNG: dec breath sounds at bases  HEART:  S1 , S2 +  ABDOMEN: soft , bs+, distension+  EXTREMITIES:  edema+  NEUROLOGY:alert awake      LABS:  05-08    122<L>  |  91<L>  |  47<H>  ----------------------------<  118<H>  6.4<HH>   |  22  |  0.74    Ca    7.8<L>      08 May 2023 22:33    TPro  4.8<L>  /  Alb  2.4<L>  /  TBili  0.5  /  DBili      /  AST  18  /  ALT  11  /  AlkPhos  96  05-07    Creatinine Trend: 0.74 <--, 0.96 <--, 0.92 <--, 0.92 <--, 0.99 <--, 0.98 <--, 0.93 <--, 0.96 <--                        10.3   9.30  )-----------( 166      ( 07 May 2023 07:06 )             31.9     Urine Studies:  Urinalysis Basic - ( 04 May 2023 06:10 )    Color: Yellow / Appearance: Clear / SG: >1.050 / pH:   Gluc:  / Ketone: Small  / Bili: Negative / Urobili: Negative   Blood:  / Protein: 30 mg/dL / Nitrite: Negative   Leuk Esterase: Negative / RBC: 3 /hpf / WBC 1 /HPF   Sq Epi:  / Non Sq Epi:  / Bacteria: Negative      Sodium, Random Urine: 176 mmol/L (05-07 @ 13:48)  Potassium, Random Urine: 63 mmol/L (05-07 @ 13:48)  Osmolality, Random Urine: 877 mos/kg (05-07 @ 13:48)          LIVER FUNCTIONS - ( 07 May 2023 07:05 )  Alb: 2.4 g/dL / Pro: 4.8 g/dL / ALK PHOS: 96 U/L / ALT: 11 U/L / AST: 18 U/L / GGT: x                       Color: Yellow / Appearance: Clear / SG: >1.050 / pH:   Gluc:  / Ketone: Small  / Bili: Negative / Urobili: Negative   Blood:  / Protein: 30 mg/dL / Nitrite: Negative   Leuk Esterase: Negative / RBC: 3 /hpf / WBC 1 /HPF   Sq Epi:  / Non Sq Epi:  / Bacteria: Negative                PTT - ( 04 May 2023 09:59 )  PTT:64.8 sec        RADIOLOGY & ADDITIONAL TESTS:    Imaging Personally Reviewed:yes    Consultant(s) Notes Reviewed:  yes    Care Discussed with Consultants/Other Providers:yes  
    SUBJECTIVE / OVERNIGHT EVENTS:pt seen and examined  05-14-23     MEDICATIONS  (STANDING):  chlorhexidine 2% Cloths 1 Application(s) Topical daily  enoxaparin Injectable 100 milliGRAM(s) SubCutaneous every 24 hours  polyethylene glycol 3350 17 Gram(s) Oral once  tamsulosin 0.4 milliGRAM(s) Oral daily    MEDICATIONS  (PRN):    Vital Signs Last 24 Hrs  T(C): 36.4 (05-14-23 @ 21:48), Max: 36.4 (05-14-23 @ 00:30)  T(F): 97.5 (05-14-23 @ 21:48), Max: 97.6 (05-14-23 @ 00:30)  HR: 98 (05-14-23 @ 21:48) (98 - 103)  BP: 110/73 (05-14-23 @ 21:48) (103/68 - 117/65)  BP(mean): --  RR: 18 (05-14-23 @ 21:48) (18 - 18)  SpO2: 98% (05-14-23 @ 21:48) (96% - 98%)          Constitutional: No fever, fatigue  Skin: No rash.  Eyes: No recent vision problems or eye pain.  ENT: No congestion, ear pain, or sore throat.  Cardiovascular: No chest pain or palpation.  Respiratory: No cough, shortness of breath, congestion, or wheezing.  Gastrointestinal: No abdominal pain, nausea, vomiting, or diarrhea.  Genitourinary: No dysuria.  Musculoskeletal: No joint swelling.  Neurologic: No headache.    PHYSICAL EXAM:  GENERAL: NAD  EYES: EOMI, PERRLA  NECK: Supple, No JVD  CHEST/LUNG: dec breath sounds at bases  HEART:  S1 , S2 +  ABDOMEN: soft , bs+, distension+  EXTREMITIES:  edema+  NEUROLOGY:alert awake    LABS:  05-14    x   |  x   |  x   ----------------------------<  x   5.0   |  x   |  x     Ca    8.7      14 May 2023 07:14      Creatinine Trend: 1.10 <--, 0.96 <--, 0.92 <--, 0.96 <--, 0.84 <--, 0.90 <--, 1.00 <--, 0.91 <--, 0.89 <--, 0.85 <--, 0.87 <--, 0.88 <--, 0.95 <--, 0.94 <--, 0.89 <--, 0.74 <--, 0.96 <--, 0.92 <--                        11.8   9.39  )-----------( 337      ( 14 May 2023 07:14 )             35.9     Urine Studies:                                PTT - ( 04 May 2023 09:59 )  PTT:64.8 sec        RADIOLOGY & ADDITIONAL TESTS:    Imaging Personally Reviewed:yes    Consultant(s) Notes Reviewed:  yes    Care Discussed with Consultants/Other Providers:yes  
Patient is a 87y old  Male who presents with a chief complaint of PE / DVT (05 May 2023 14:24)    No acute evens overnight.  Patient seen and examined at bedside this morning.       MEDICATIONS  (STANDING):  chlorhexidine 2% Cloths 1 Application(s) Topical daily  enoxaparin Injectable 100 milliGRAM(s) SubCutaneous every 24 hours  tamsulosin 0.4 milliGRAM(s) Oral daily    MEDICATIONS  (PRN):      Vital Signs Last 24 Hrs  T(C): 36.6 (05 May 2023 11:53), Max: 36.8 (04 May 2023 17:06)  T(F): 97.9 (05 May 2023 11:53), Max: 98.2 (04 May 2023 17:06)  HR: 112 (05 May 2023 11:29) (100 - 112)  BP: 122/88 (05 May 2023 11:29) (110/75 - 143/80)  BP(mean): --  RR: 20 (05 May 2023 11:29) (16 - 20)  SpO2: 97% (05 May 2023 11:29) (94% - 100%)    Parameters below as of 05 May 2023 11:29  Patient On (Oxygen Delivery Method): room air        PE  NAD  Awake, alert  Anicteric, MMM  RRR  CTAB  Abd soft, NT, ND  No c/c/e  No rash grossly  FROM                          12.1   10.82 )-----------( 195      ( 05 May 2023 11:14 )             37.3       05-05    128<L>  |  91<L>  |  18  ----------------------------<  189<H>  4.1   |  20<L>  |  0.98    Ca    8.6      05 May 2023 11:14    TPro  5.2<L>  /  Alb  2.6<L>  /  TBili  0.6  /  DBili  x   /  AST  27  /  ALT  16  /  AlkPhos  96  05-03    TRANSTHORACIC ECHOCARDIOGRAM REPORT  ________________________________________________________________________________                                      _______       Pt. Name:       LENA BATISTA Study Date:    5/4/2023  MRN:            [id number]     YOB: 1935  Accession #:    [id number]      Age:           87 years  Account#:       [id number]   Gender:        M  Heart Rate:                    Height:        65.00 in (165.10 cm)  Rhythm:                        Weight:        151.00 lb (68.49 kg)  Blood Pressure: 116/82 mmHg    BSA/BMI:       1.76 m² / 25.13 kg/m²  ________________________________________________________________________________________  Referring Physician:    [id number] Carson Otero  Interpreting Physician: Carmelita Guevara MD  Primary Sonographer:    Diana Ortiz RDCS    CPT:                ECHO TTE WITH CON COMP W DOPP - .m; DEFINITY ECHO                      CONTRAST PER ML - .m  Indication(s):      Other pulmonary embolism without acute cor pulmonale -                      I26.99  Procedure:          Transthoracic echocardiogram with 2-D, M-mode and complete                      spectral and color flow Doppler.  Ordering Location:  [facility]  Contrast Injection: Verbal consent was obtained for injection of Ultrasonic                      Enhancing Agent following a discussion of risks and                      benefits.                      Endocardial visualization enhanced with 2 ml of Definity                      Ultrasound enhancing agent (Lot#:[id number] Discarded Dose:8ml).  UEA Reaction:       Patient had no adverse reaction after injection of                      Ultrasound Enhancing Agent.    _______________________________________________________________________________________  CONCLUSIONS:      1. The left ventricular systolic function is hyperdynamic.   2. Mildly enlarged right ventricular cavity size and normal wall thickness The tricuspid annular plane systolic excursion (TAPSE) is 1.3 cm (normal >=1.7 cm).   3. Mild tricuspid regurgitation.   4. Mild pulmonary hypertension.   5. Small pericardial effusion with no evidence of hemodynamic compromise.   6. Organized fibrinous vs coagulant material is seen in the pericardial space.   7. No prior echocardiogram is available for comparison.    ________________________________________________________________________________________  FINDINGS:  Left Ventricle:  After obtaining consent, Definity ultrasound enhancing agent was given for enhanced left ventricular opacification and improved delineation of the left ventricular endocardial borders. The left ventricular systolic function is hyperdynamic with an ejection fraction visually estimated at 70 to 75%.     Right Ventricle:  Mildly enlarged right ventricular cavity size, normal wall thickness and mildly reduced systolic function. Tricuspid annular plane systolic excursion (TAPSE) is 1.3 cm (normal >=1.7 cm).     Left Atrium:  The left atrium is normal, with an indexed volume of 14 14 ml/m².     Aortic Valve:  The aortic valve is tricuspid with normal structure without stenosis with normal systolic excursion.     Mitral Valve:  Structurally normal mitral valve with normal leaflet excursion.     Tricuspid Valve:  Structurally normal tricuspid valve with normal leaflet excursion. There is mild tricuspid regurgitation. Estimated pulmonary artery systolic pressure is 49 mmHg, consistent with mild pulmonary hypertension.     Pericardium:  The pericardium is thickened. Organized fibrinous vs coagulant material is seen in the pericardial space. There is a small pericardial effusion with no evidence of hemodynamic compromise with a blood pressure of 116 mmHg/82 mmHg and a heart rate of 99 bpm.     Pleura:  Left pleural effusion noted.     Additional Findings and Comments:  The presence of abdominal ascites is incidentally noted.  ____________________________________________________________________  QUANTITATIVE DATA  Left Ventricle Measurements                         Indexed to BSA  IVSd (2D):   1.3 cm  LVPWd (2D):  0.8 cm  LVIDd (2D):  3.4 cm  LVIDs (2D):  2.4 cm  LV Mass:     113 g    64.3 g/m²     MV E Vmax:    0.45 m/s  MV A Vmax:    0.74 m/s  MV E/A:       0.61  e' lateral:   7.51 cm/s  e' medial:    5.07 cm/s  E/e' lateral: 6.02  E/e' medial:  8.92  E/e' Average: 7.19       Left Atrium Measurements     LA Diam 2D: 3.40 cm    Right Ventricle Measurements     TAPSE: 1.3 cm       LVOT / RVOT/ Qp/Qs Data:  Mitral Valve Measurements     MV E Vmax:     0.5 m/s  MV A Vmax:     0.7 m/s  MV E/A:        0.6  MV P 1/2 Time: 70 msec       Tricuspid Valve Measurements     TR Vmax:          3.4 m/s  TR Peak Gradient: 45.7 mmHg  RA Pressure:      3 mmHg  PASP:             49 mmHg    ________________________________________________________________________________________  Electronically signed by Carmelita Guevara MD on 5/4/2023 at 10:39:15 AM    
Patient is a 87y old  Male who presents with a chief complaint of Sent from PCP above for B/L PE noted on CTT angiogram outpatient and + DVT> (11 May 2023 19:40)    Pt seen and examined at bedside. Feeling well. Sodium improving. Eager to go home.    MEDICATIONS  (STANDING):  chlorhexidine 2% Cloths 1 Application(s) Topical daily  enoxaparin Injectable 100 milliGRAM(s) SubCutaneous every 24 hours  polyethylene glycol 3350 17 Gram(s) Oral once  tamsulosin 0.4 milliGRAM(s) Oral daily    MEDICATIONS  (PRN):      Vital Signs Last 24 Hrs  T(C): 36.4 (12 May 2023 04:34), Max: 37.1 (12 May 2023 00:58)  T(F): 97.5 (12 May 2023 04:34), Max: 98.7 (12 May 2023 00:58)  HR: 100 (12 May 2023 04:34) (90 - 100)  BP: 107/72 (12 May 2023 04:34) (101/66 - 107/73)  BP(mean): --  RR: 18 (12 May 2023 04:34) (18 - 18)  SpO2: 96% (12 May 2023 04:34) (96% - 100%)    Parameters below as of 12 May 2023 04:34  Patient On (Oxygen Delivery Method): room air        PE  NAD  Awake, alert  Anicteric, MMM  RRR  CTAB  Abd soft, NT, ND  No c/c/e  No rash grossly  FROM        05-12    129<L>  |  96  |  21  ----------------------------<  104<H>  4.5   |  22  |  0.84    Ca    7.4<L>      12 May 2023 06:16    TPro  4.5<L>  /  Alb  1.7<L>  /  TBili  0.3  /  DBili  x   /  AST  17  /  ALT  12  /  AlkPhos  111  05-12      
Vascular Cardiology Progress Note    DIRECT PROVIDER NUMBER: [phone]  / Available on TEAMS    CC:  PE / DVT    Interval Events:    Duaghter in law at bedside  He feels ok   Sodium is 126  On lovenox  Leg swelling is better      Allergies  No Known Allergies     MEDICATIONS  (STANDING):  chlorhexidine 2% Cloths 1 Application(s) Topical daily  enoxaparin Injectable 100 milliGRAM(s) SubCutaneous every 24 hours  tamsulosin 0.4 milliGRAM(s) Oral daily  tolvaptan 15 milliGRAM(s) Oral once        PAST MEDICAL & SURGICAL HISTORY:  Cholangiocarcinoma  Disseminated malignant neoplasm  Pulmonary embolism  Prostate cancer  History of prostate cancer    FAMILY HISTORY:  No pertinent family history in first degree relatives    SOCIAL HISTORY:  unchanged    REVIEW OF SYSTEMS:  CONSTITUTIONAL: No fatigue  EYES: No eye pain  ENT:  No throat pain  NECK: No pain   RESPIRATORY: No current SOB  CARDIOVASCULAR: No C/P  GASTROINTESTINAL: No abdominal pain  GENITOURINARY: No hematuria  NEUROLOGICAL: No memory loss  SKIN: No LE wounds  LYMPH Nodes: No enlarged glands noted  ENDOCRINE: No heat or cold intolerance noted  MUSCULOSKELETAL: LE edema improved  PSYCHIATRIC: No depression, anxiety  HEME/LYMPH: No  bleeding gums  ALLERGY AND IMMUNOLOGIC: No hives    [ x] All others negative	     ICU Vital Signs Last 24 Hrs  T(C): 36.7 (11 May 2023 04:32), Max: 36.7 (10 May 2023 20:58)  T(F): 98 (11 May 2023 04:32), Max: 98 (10 May 2023 20:58)  HR: 93 (11 May 2023 04:32) (93 - 101)  BP: 102/63 (11 May 2023 04:32) (93/60 - 102/63)  BP(mean): --  ABP: --  ABP(mean): --  RR: 18 (11 May 2023 04:32) (18 - 18)  SpO2: 96% (11 May 2023 04:32) (96% - 97%)    O2 Parameters below as of 11 May 2023 04:32  Patient On (Oxygen Delivery Method): room air          Parameters below as of 10 May 2023 04:48  Patient On (Oxygen Delivery Method): room air    Appearance: NAD 	  HEENT:  NC/AT  Cardiovascular: RRR, S1 and S2  Respiratory: CTA B/L  Psychiatry:  AAO x 3  Gastrointestinal:  Soft,+ BS	  Skin: No cyanosis	  Neurologic: No focal deficits   Extremities: Decreased Bilateral LE edema, Calves Soft     05-11    125<L>  |  93<L>  |  22  ----------------------------<  110<H>  4.5   |  24  |  0.91    Ca    7.7<L>      11 May 2023 06:44      Assessment:  1. Segmental and Subsegmental PE on outpatient CTA       Troponin negative, BNP down trending        Mildly enlarged RV  2. Cholangiocarcinoma with metastasis  3. R Soleal and Tibial peroneal trunk DVT at duplex at [facility]  4. Hyponatremia  5. Prostate CA  6. Ascites s/p Paracentesis      Plan:  1. Continue Therapeutic Lovenox.   2. Patient reports feeling improved on ambulation.  3.  Renal to manage sodium , getting Tolvaptan    d/w family at bedside    Mikel 
Vascular Cardiology Progress Note / Available on TEAMS    DIRECT PROVIDER NUMBER: [phone]     CC:  PE / DVT    Interval Events:    No reported C/P or SOB.  Reports feeling improved on ambulation.   LE edema.    Allergies  No Known Allergies    MEDICATIONS  (STANDING):  chlorhexidine 2% Cloths 1 Application(s) Topical daily  enoxaparin Injectable 100 milliGRAM(s) SubCutaneous every 24 hours  tamsulosin 0.4 milliGRAM(s) Oral daily    PAST MEDICAL & SURGICAL HISTORY:  Cholangiocarcinoma  Disseminated malignant neoplasm  Pulmonary embolism  Prostate cancer  History of prostate cancer    FAMILY HISTORY:  No pertinent family history in first degree relatives    SOCIAL HISTORY:  unchanged    REVIEW OF SYSTEMS:  CONSTITUTIONAL: No fatigue  EYES: No eye pain  ENT:  No throat pain  NECK: No pain   RESPIRATORY: No current SOB  CARDIOVASCULAR: No C/P  GASTROINTESTINAL: No abdominal pain  GENITOURINARY: No hematuria  NEUROLOGICAL: No memory loss  SKIN: No LE wounds  LYMPH Nodes: No enlarged glands noted  ENDOCRINE: No heat or cold intolerance noted  MUSCULOSKELETAL: LE edema    PSYCHIATRIC: No depression, anxiety  HEME/LYMPH: No  bleeding gums  ALLERGY AND IMMUNOLOGIC: No hives    [ x] All others negative	    PHYSICAL EXAM:  Vital Signs Last 24 Hrs  T(C): 36.4 (08 May 2023 09:07), Max: 36.7 (07 May 2023 10:45)  T(F): 97.5 (08 May 2023 09:07), Max: 98.1 (07 May 2023 10:45)  HR: 109 (08 May 2023 09:07) (91 - 112)  BP: 113/74 (08 May 2023 09:07) (92/58 - 125/86)  RR: 18 (08 May 2023 09:07) (18 - 18)  SpO2: 97% (08 May 2023 09:07) (92% - 97%)    Parameters below as of 08 May 2023 09:07  Patient On (Oxygen Delivery Method): room air    Appearance: NAD 	  HEENT:  NC/AT  Cardiovascular: Tachycardic, S1 and S2  Respiratory: CTA B/L  Psychiatry:  AAO x 3  Gastrointestinal:  Soft, Non-tender, + BS	  Skin: No cyanosis	  Neurologic: No focal deficits   Extremities: Bilateral LE edema, Calves soft      LABS:	 	                        10.3   9.30  )-----------( 166      ( 07 May 2023 07:06 )             31.9     05-08    123<L>  |  90<L>  |  22  ----------------------------<  123<H>  4.1   |  21<L>  |  0.92    Ca    8.1<L>      08 May 2023 07:37    TPro  4.8<L>  /  Alb  2.4<L>  /  TBili  0.5  /  DBili  x   /  AST  18  /  ALT  11  /  AlkPhos  96  05-07      Assessment:  1. Segmental and Subsegmental PE on outpatient CTA       Troponin negative, BNP down trending        Mildly enlarged RV  2. Cholangiocarcinoma with metastasis  3. R Soleal and Tibial peroneal trunk DVT at duplex at [facility]  4. Hyponatremia  5. Prostate CA     Plan:  1. Recommend to continue therapeutic Lovenox.   2. Patient reports feeling improved on ambulation.  3. Avoid exertional activity at this time.   4. Appreciate excellent care by Primary Team.     Thank you  LUCAS Dunbar, MS, [facility]  Vascular Cardiology Service    Please call with any questions:   DIRECT SERVICE NUMBER: [phone]  / Available on TEAMS
Patient is a 87y old  Male who presents with a chief complaint of PE/DVT (09 May 2023 09:11)    Patient seen and examined at bedside this morning. Patient resting comfortably in recliner, family at bedside.    MEDICATIONS  (STANDING):  chlorhexidine 2% Cloths 1 Application(s) Topical daily  tamsulosin 0.4 milliGRAM(s) Oral daily    MEDICATIONS  (PRN):      Vital Signs Last 24 Hrs  T(C): 36.3 (09 May 2023 11:34), Max: 37 (08 May 2023 21:21)  T(F): 97.3 (09 May 2023 11:34), Max: 98.6 (08 May 2023 21:21)  HR: 107 (09 May 2023 11:34) (99 - 108)  BP: 120/85 (09 May 2023 11:34) (107/75 - 123/77)  BP(mean): --  RR: 18 (09 May 2023 11:34) (18 - 18)  SpO2: 95% (09 May 2023 11:34) (94% - 98%)    Parameters below as of 09 May 2023 11:34  Patient On (Oxygen Delivery Method): room air        PE  NAD  Awake, alert  Anicteric, MMM  RRR  CTAB  Abd soft, NT,  +distended  No c/c  +B/L HUBERT    05-09    126<L>  |  90<L>  |  38<H>  ----------------------------<  147<H>  4.1   |  22  |  0.95    Ca    8.3<L>      09 May 2023 13:44        
Vascular Cardiology Progress Note    DIRECT PROVIDER NUMBER: [phone]  / Available on TEAMS    CC:  PE / DVT    Interval Events:    S/p paracentesis yesterday.  HR improved.   No C/P or SOB.  Reports feeling improved on ambulation.   Decreased LE edema.     Allergies  No Known Allergies    MEDICATIONS  (STANDING):  chlorhexidine 2% Cloths 1 Application(s) Topical daily  enoxaparin Injectable 100 milliGRAM(s) SubCutaneous every 24 hours  tamsulosin 0.4 milliGRAM(s) Oral daily  tolvaptan 7.5 milliGRAM(s) Oral once      PAST MEDICAL & SURGICAL HISTORY:  Cholangiocarcinoma  Disseminated malignant neoplasm  Pulmonary embolism  Prostate cancer  History of prostate cancer    FAMILY HISTORY:  No pertinent family history in first degree relatives    SOCIAL HISTORY:  unchanged    REVIEW OF SYSTEMS:  CONSTITUTIONAL: No fatigue  EYES: No eye pain  ENT:  No throat pain  NECK: No pain   RESPIRATORY: No current SOB  CARDIOVASCULAR: No C/P  GASTROINTESTINAL: No abdominal pain  GENITOURINARY: No hematuria  NEUROLOGICAL: No memory loss  SKIN: No LE wounds  LYMPH Nodes: No enlarged glands noted  ENDOCRINE: No heat or cold intolerance noted  MUSCULOSKELETAL: LE edema improved  PSYCHIATRIC: No depression, anxiety  HEME/LYMPH: No  bleeding gums  ALLERGY AND IMMUNOLOGIC: No hives    [ x] All others negative	    PHYSICAL EXAM:  Vital Signs Last 24 Hrs  T(C): 36.4 (10 May 2023 04:48), Max: 36.7 (09 May 2023 18:22)  T(F): 97.5 (10 May 2023 04:48), Max: 98.1 (09 May 2023 18:22)  HR: 99 (10 May 2023 04:48) (90 - 108)  BP: 106/66 (10 May 2023 04:48) (97/65 - 120/85)  RR: 18 (10 May 2023 04:48) (18 - 18)  SpO2: 95% (10 May 2023 04:48) (95% - 99%)    Parameters below as of 10 May 2023 04:48  Patient On (Oxygen Delivery Method): room air    Appearance: NAD 	  HEENT:  NC/AT  Cardiovascular: RRR, S1 and S2  Respiratory: CTA B/L  Psychiatry:  AAO x 3  Gastrointestinal:  Soft,+ BS	  Skin: No cyanosis	  Neurologic: No focal deficits   Extremities: Decreased Bilateral LE edema, Calves Soft      LABS:	 	                                   10.4   9.22  )-----------( 216      ( 10 May 2023 06:39 )             31.1     05-10    126<L>  |  92<L>  |  29<H>  ----------------------------<  89  4.4   |  24  |  0.87    Ca    7.9<L>      10 May 2023 06:39    Assessment:  1. Segmental and Subsegmental PE on outpatient CTA       Troponin negative, BNP down trending        Mildly enlarged RV  2. Cholangiocarcinoma with metastasis  3. R Soleal and Tibial peroneal trunk DVT at duplex at [facility]  4. Hyponatremia  5. Prostate CA  6. Ascites s/p Paracentesis      Plan:  1. Continue Therapeutic Lovenox.   2. Patient reports feeling improved on ambulation.  3. Avoid exertional activity at this time.   4. HR improved and LE edema improved post paracentesis.  5. Appreciate Renal care for hyponatremia.   6. Appreciate excellent care by Primary Team. Appreciate IR and Heme-Onc.     Thank you  LUCAS Dunbar, MS, [facility]  Vascular Cardiology Service    Please call with any questions:   DIRECT SERVICE NUMBER: [phone]  / Available on TEAMS
Vascular Cardiology Progress Note / Available on TEAMS    DIRECT PROVIDER NUMBER: [phone]     CC:  PE / DVT    Interval Events:    Reports fatigue.  No reported C/P or SOB.  LE edema noted.  O2 97% on RA and on ambulation.  HR 100s at rest, 127 on ambulation.     Allergies  No Known Allergies    MEDICATIONS:  enoxaparin Injectable 100 milliGRAM(s) SubCutaneous every 24 hours  chlorhexidine 2% Cloths 1 Application(s) Topical daily  tamsulosin 0.4 milliGRAM(s) Oral daily    PAST MEDICAL & SURGICAL HISTORY:  Cholangiocarcinoma  Disseminated malignant neoplasm  Pulmonary embolism  Prostate cancer  History of prostate cancer    FAMILY HISTORY:  No pertinent family history in first degree relatives    SOCIAL HISTORY:  unchanged    REVIEW OF SYSTEMS:  CONSTITUTIONAL: Fatigue  EYES: No eye pain  ENT:  No throat pain  NECK: No pain   RESPIRATORY: No current SOB  CARDIOVASCULAR: No C/P  GASTROINTESTINAL: No abdominal pain  GENITOURINARY: No hematuria  NEUROLOGICAL: No memory loss  SKIN: No LE wounds  LYMPH Nodes: No enlarged glands noted  ENDOCRINE: No heat or cold intolerance noted  MUSCULOSKELETAL: LE edema    PSYCHIATRIC: No depression, anxiety  HEME/LYMPH: No  bleeding gums  ALLERGY AND IMMUNOLOGIC: No hives    [ x] All others negative	    PHYSICAL EXAM:  T(C): 36.6 (05-05-23 @ 11:53), Max: 36.8 (05-04-23 @ 17:06)  HR: 112 (05-05-23 @ 11:29) (100 - 112)  BP: 122/88 (05-05-23 @ 11:29) (110/75 - 143/80)  RR: 20 (05-05-23 @ 11:29) (16 - 20)  SpO2: 97% (05-05-23 @ 11:29) (94% - 100%)  I&O's Summary    04 May 2023 07:01  -  05 May 2023 07:00  --------------------------------------------------------  IN: 330 mL / OUT: 350 mL / NET: -20 mL    Appearance: NAD 	  HEENT:  NC/AT  Cardiovascular: Tachycardic, S1 and S2  Respiratory: CTA B/L  Psychiatry:  AAO x 3  Gastrointestinal:  Soft, Non-tender, + BS	  Skin: No cyanosis	  Neurologic: No focal deficits   Extremities: Bilateral LE edema. calves soft      LABS:	 	    CBC Full  -  ( 05 May 2023 11:14 )  WBC Count : 10.82 K/uL  Hemoglobin : 12.1 g/dL  Hematocrit : 37.3 %  Platelet Count - Automated : 195 K/uL  Mean Cell Volume : 90.5 fl  Mean Cell Hemoglobin : 29.4 pg  Mean Cell Hemoglobin Concentration : 32.4 gm/dL  Auto Neutrophil # : 8.12 K/uL  Auto Lymphocyte # : 1.43 K/uL  Auto Monocyte # : 0.96 K/uL  Auto Eosinophil # : 0.10 K/uL  Auto Basophil # : 0.03 K/uL  Auto Neutrophil % : 75.0 %  Auto Lymphocyte % : 13.2 %  Auto Monocyte % : 8.9 %  Auto Eosinophil % : 0.9 %  Auto Basophil % : 0.3 %    05-05    128<L>  |  91<L>  |  18  ----------------------------<  189<H>  4.1   |  20<L>  |  0.98  05-04    127<L>  |  91<L>  |  17  ----------------------------<  97  4.0   |  21<L>  |  0.93    Ca    8.6      05 May 2023 11:14  Ca    7.9<L>      04 May 2023 06:12    TPro  5.2<L>  /  Alb  2.6<L>  /  TBili  0.6  /  DBili  x   /  AST  27  /  ALT  16  /  AlkPhos  96  05-03    Assessment:  1. Segmental and Subsegmental PE on outpatient CTA       Mildly enlarged RV (TAPSE) is 1.3 cm       Troponin negative, BNP down trending   2. Cholangiocarcinoma with metastasis  3. R Soleal and Tibial peroneal trunk DVT at duplex at [facility]  4. Hyponatremia  5. Prostate CA     Plan:  1. Recommend to continue therapeutic Lovenox.   2. Patient reports wanting to leave for the wedding in his family this evening.      Family aware that there are risks with him being discharged this evening, they are undecided at this moment and will discuss with primary team.  3. Avoid exertional activity at this time.   4. Appreciate excellent care by Primary Team.     Thank you  LUCAS Dunbar, MS, [facility]  Vascular Cardiology Service    Please call with any questions:   DIRECT SERVICE NUMBER: [phone]  / Available on TEAMS
[facility]   [phone]  RENAL FOLLOW UP NOTE  --------------------------------------------------------------------------------  Chief Complaint:    24 hour events/subjective:    seen earlier   states edema is better   pt daughter in law at bedside   still with decrease po intake     PAST HISTORY  --------------------------------------------------------------------------------  No significant changes to PMH, PSH, FHx, SHx, unless otherwise noted    ALLERGIES & MEDICATIONS  --------------------------------------------------------------------------------  Allergies    No Known Allergies    Intolerances      Standing Inpatient Medications  chlorhexidine 2% Cloths 1 Application(s) Topical daily  enoxaparin Injectable 100 milliGRAM(s) SubCutaneous every 24 hours  tamsulosin 0.4 milliGRAM(s) Oral daily    PRN Inpatient Medications      REVIEW OF SYSTEMS  --------------------------------------------------------------------------------    Gen: denies  fevers/chills,  CVS: denies chest pain/palpitations  Resp: denies SOB/Cough  GI: Denies N/V/Abd pain  : Denies dysuria/oliguria    VITALS/PHYSICAL EXAM  --------------------------------------------------------------------------------  T(C): 36.6 (05-11-23 @ 11:44), Max: 36.7 (05-10-23 @ 20:58)  HR: 102 (05-11-23 @ 11:44) (93 - 102)  BP: 105/67 (05-11-23 @ 11:44) (93/60 - 105/67)  RR: 18 (05-11-23 @ 11:44) (18 - 18)  SpO2: 97% (05-11-23 @ 11:44) (96% - 97%)  Wt(kg): --        05-10-23 @ 07:01  -  05-11-23 @ 07:00  --------------------------------------------------------  IN: 360 mL / OUT: 1 mL / NET: 359 mL      Physical Exam:  	  	Gen: chronically ill appearing M   	Pulm: decrease bs  no rales or ronchi or wheezing  	CV: No JVD. RRR, S1S2; no rub  	Abd: +BS, soft, + ascites non tender   	: No suprapubic tenderness  	UE: Warm, no cyanosis  no clubbing,  no edema  	LE: Warm, no cyanosis  no clubbing,  2-  edema  	Neuro: alert and oriented. speech coherent       LABS/STUDIES  --------------------------------------------------------------------------------              10.4   9.22  >-----------<  216      [05-10-23 @ 06:39]              31.1     125  |  91  |  25  ----------------------------<  141      [05-11-23 @ 16:56]  4.5   |  24  |  1.00        Ca     7.7     [05-11-23 @ 16:56]            Creatinine Trend:  SCr 1.00 [05-11 @ 16:56]  SCr 0.91 [05-11 @ 06:44]  SCr 0.89 [05-10 @ 21:50]  SCr 0.85 [05-10 @ 16:01]  SCr 0.87 [05-10 @ 06:39]              Urinalysis - [05-04-23 @ 06:10]      Color Yellow / Appearance Clear / SG >1.050 / pH 6.0      Gluc Negative / Ketone Small  / Bili Negative / Urobili Negative       Blood Trace / Protein 30 mg/dL / Leuk Est Negative / Nitrite Negative      RBC 3 / WBC 1 / Hyaline 1 / Gran  / Sq Epi  / Non Sq Epi  / Bacteria Negative    Urine Sodium 176      [05-07-23 @ 13:48]  Urine Potassium 63      [05-07-23 @ 13:48]  Urine Osmolality 877      [05-07-23 @ 13:48]          
CHIEF COMPLAINT  VTE    HISTORY OF PRESENT ILLNESS  LENA BATISTA is a 87y Male who presents with a chief complaint of VTE    No acute events. No complaints.    REVIEW OF SYSTEMS  A complete review of systems was performed; negative except per HPI    PHYSICAL EXAM  T(C): 36.6 (05-13-23 @ 03:58), Max: 36.6 (05-13-23 @ 03:58)  HR: 99 (05-13-23 @ 03:58) (98 - 107)  BP: 110/76 (05-13-23 @ 03:58) (107/68 - 118/86)  RR: 18 (05-13-23 @ 03:58) (18 - 18)  SpO2: 99% (05-13-23 @ 03:58) (96% - 99%)  Constitutional: alert, awake, in no acute distress  Eyes: PERRL, EOMI  HEENT: normocephalic, atraumatic  Neck: supple, non-tender  Cardiovascular: normal perfusion, tachycardic  Respiratory: normal respiratory efforts; no increased use of accessory muscles  Gastrointestinal: soft, non-tender  Musculoskeletal: normal range of motion, no deformities noted  Neurological: alert, CN II to XI grossly intact  Skin: warm, dry    LABORATORY DATA  None  
[place] [facility]   [phone]  RENAL FOLLOW UP NOTE  --------------------------------------------------------------------------------  Chief Complaint:    24 hour events/subjective:    patient seen and examined.   denies sob  decreased appetite    PAST HISTORY  --------------------------------------------------------------------------------  No significant changes to PMH, PSH, FHx, SHx, unless otherwise noted    ALLERGIES & MEDICATIONS  --------------------------------------------------------------------------------  Allergies    No Known Allergies    Intolerances      Standing Inpatient Medications  chlorhexidine 2% Cloths 1 Application(s) Topical daily  enoxaparin Injectable 100 milliGRAM(s) SubCutaneous every 24 hours  polyethylene glycol 3350 17 Gram(s) Oral once  tamsulosin 0.4 milliGRAM(s) Oral daily    PRN Inpatient Medications      REVIEW OF SYSTEMS  --------------------------------------------------------------------------------    Gen: denies fevers/chills,  CVS: denies chest pain/palpitations  Resp: denies SOB/Cough  GI: Denies N/V/Abd pain  : Denies dysuria/oliguria/hematuria    VITALS/PHYSICAL EXAM  --------------------------------------------------------------------------------  T(C): 36.6 (05-15-23 @ 13:09), Max: 36.6 (05-15-23 @ 04:52)  HR: 98 (05-15-23 @ 13:09) (98 - 100)  BP: 108/78 (05-15-23 @ 13:09) (100/70 - 117/80)  RR: 17 (05-15-23 @ 13:09) (17 - 18)  SpO2: 95% (05-15-23 @ 13:08) (95% - 98%)  Wt(kg): --        Physical Exam:  	  	Gen: chronically ill appearing M   	Pulm: decrease bs  no rales or ronchi or wheezing  	CV: No JVD. RRR, S1S2; no rub  	Abd: +BS, soft, distended, + ascites non tender   	: No suprapubic tenderness  	UE: Warm, no cyanosis  no clubbing,  no edema  	LE: Warm, no cyanosis  no clubbing,  2- edema  	Neuro: alert and oriented. speech coherent     LABS/STUDIES  --------------------------------------------------------------------------------              11.8   9.39  >-----------<  337      [05-14-23 @ 07:14]              35.9     129  |  96  |  32  ----------------------------<  109      [05-15-23 @ 06:51]  4.8   |  26  |  1.02        Ca     7.9     [05-15-23 @ 06:51]            Creatinine Trend:  SCr 1.02 [05-15 @ 06:51]  SCr 1.10 [05-14 @ 07:14]  SCr 0.96 [05-13 @ 06:17]  SCr 0.92 [05-13 @ 00:31]  SCr 0.96 [05-12 @ 17:22]              Urinalysis - [05-04-23 @ 06:10]      Color Yellow / Appearance Clear / SG >1.050 / pH 6.0      Gluc Negative / Ketone Small  / Bili Negative / Urobili Negative       Blood Trace / Protein 30 mg/dL / Leuk Est Negative / Nitrite Negative      RBC 3 / WBC 1 / Hyaline 1 / Gran  / Sq Epi  / Non Sq Epi  / Bacteria Negative          
[place] [facility]   [phone]  RENAL FOLLOW UP NOTE  --------------------------------------------------------------------------------  Chief Complaint:    24 hour events/subjective:    seen earlier   states breathing is better no cp or palp     PAST HISTORY  --------------------------------------------------------------------------------  No significant changes to PMH, PSH, FHx, SHx, unless otherwise noted    ALLERGIES & MEDICATIONS  --------------------------------------------------------------------------------  Allergies    No Known Allergies    Intolerances      Standing Inpatient Medications  chlorhexidine 2% Cloths 1 Application(s) Topical daily  enoxaparin Injectable 100 milliGRAM(s) SubCutaneous every 24 hours  polyethylene glycol 3350 17 Gram(s) Oral once  tamsulosin 0.4 milliGRAM(s) Oral daily    PRN Inpatient Medications      REVIEW OF SYSTEMS  --------------------------------------------------------------------------------    Gen: denies  fevers/chills,  CVS: denies chest pain/palpitations  Resp: denies SOB/Cough  GI: Denies N/V/Abd pain  : Denies dysuria/oliguria    VITALS/PHYSICAL EXAM  --------------------------------------------------------------------------------  T(C): 36.4 (05-12-23 @ 20:32), Max: 37.1 (05-12-23 @ 00:58)  HR: 107 (05-12-23 @ 20:32) (90 - 107)  BP: 118/86 (05-12-23 @ 20:32) (98/66 - 118/86)  RR: 18 (05-12-23 @ 20:32) (18 - 18)  SpO2: 96% (05-12-23 @ 20:32) (96% - 100%)  Wt(kg): --        05-11-23 @ 07:01  -  05-12-23 @ 07:00  --------------------------------------------------------  IN: 360 mL / OUT: 0 mL / NET: 360 mL    05-12-23 @ 07:01  -  05-12-23 @ 23:18  --------------------------------------------------------  IN: 340 mL / OUT: 0 mL / NET: 340 mL      Physical Exam:  	    	Gen: chronically ill appearing M   	Pulm: decrease bs  no rales or ronchi or wheezing  	CV: No JVD. RRR, S1S2; no rub  	Abd: +BS, soft, + ascites non tender   	: No suprapubic tenderness  	UE: Warm, no cyanosis  no clubbing,  no edema  	LE: Warm, no cyanosis  no clubbing,  2-  edema  	Neuro: alert and oriented. speech coherent     LABS/STUDIES  --------------------------------------------------------------------------------    127  |  91  |  25  ----------------------------<  143      [05-12-23 @ 17:22]  5.1   |  24  |  0.96        Ca     8.2     [05-12-23 @ 17:22]    TPro  4.5  /  Alb  1.7  /  TBili  0.3  /  DBili  x   /  AST  17  /  ALT  12  /  AlkPhos  111  [05-12-23 @ 06:16]          Creatinine Trend:  SCr 0.96 [05-12 @ 17:22]  SCr 0.84 [05-12 @ 06:16]  SCr 0.90 [05-11 @ 23:57]  SCr 1.00 [05-11 @ 16:56]  SCr 0.91 [05-11 @ 06:44]              Urinalysis - [05-04-23 @ 06:10]      Color Yellow / Appearance Clear / SG >1.050 / pH 6.0      Gluc Negative / Ketone Small  / Bili Negative / Urobili Negative       Blood Trace / Protein 30 mg/dL / Leuk Est Negative / Nitrite Negative      RBC 3 / WBC 1 / Hyaline 1 / Gran  / Sq Epi  / Non Sq Epi  / Bacteria Negative    Urine Sodium 176      [05-07-23 @ 13:48]  Urine Potassium 63      [05-07-23 @ 13:48]  Urine Osmolality 877      [05-07-23 @ 13:48]          
Patient is a 87y old  Male who presents with a chief complaint of PE/DVT (10 May 2023 10:56)    Patient seen and examined at bedside this morning. Patient resting in recliner. Reports improvement in appetite since paracentesis     MEDICATIONS  (STANDING):  chlorhexidine 2% Cloths 1 Application(s) Topical daily  enoxaparin Injectable 100 milliGRAM(s) SubCutaneous every 24 hours  tamsulosin 0.4 milliGRAM(s) Oral daily  tolvaptan 7.5 milliGRAM(s) Oral once    MEDICATIONS  (PRN):      Vital Signs Last 24 Hrs  T(C): 36.6 (10 May 2023 11:39), Max: 36.7 (09 May 2023 18:22)  T(F): 97.9 (10 May 2023 11:39), Max: 98.1 (09 May 2023 18:22)  HR: 96 (10 May 2023 11:39) (90 - 101)  BP: 96/62 (10 May 2023 11:39) (96/62 - 110/66)  BP(mean): --  RR: 18 (10 May 2023 11:39) (18 - 18)  SpO2: 97% (10 May 2023 11:39) (95% - 97%)    Parameters below as of 10 May 2023 11:39  Patient On (Oxygen Delivery Method): room air        PE  NAD  Awake, alert  Anicteric, MMM  RRR  CTAB  Abd soft, NT,  No c/c  +B/L HUBERT  No rash grossly  FROM                          10.4   9.22  )-----------( 216      ( 10 May 2023 06:39 )             31.1       05-10    126<L>  |  92<L>  |  29<H>  ----------------------------<  89  4.4   |  24  |  0.87    Ca    7.9<L>      10 May 2023 06:39        
Patient is a 87y old  Male who presents with a chief complaint of Sent from PCP above for B/L PE noted on CTT angiogram outpatient and + DVT> (14 May 2023 12:33)    Pt seen and examined at bedside. Feeling ok, daughter in law at bedside. Reports his appetite has been low. Reviewed US findings.    MEDICATIONS  (STANDING):  chlorhexidine 2% Cloths 1 Application(s) Topical daily  enoxaparin Injectable 100 milliGRAM(s) SubCutaneous every 24 hours  polyethylene glycol 3350 17 Gram(s) Oral once  tamsulosin 0.4 milliGRAM(s) Oral daily    MEDICATIONS  (PRN):      Vital Signs Last 24 Hrs  T(C): 36.6 (15 May 2023 04:52), Max: 36.6 (15 May 2023 04:52)  T(F): 97.8 (15 May 2023 04:52), Max: 97.8 (15 May 2023 04:52)  HR: 99 (15 May 2023 04:52) (98 - 100)  BP: 100/70 (15 May 2023 04:52) (100/70 - 110/73)  BP(mean): --  RR: 18 (15 May 2023 04:52) (18 - 18)  SpO2: 96% (15 May 2023 04:52) (96% - 98%)    Parameters below as of 15 May 2023 04:52  Patient On (Oxygen Delivery Method): room air        PE  NAD  Awake, alert  Anicteric, MMM  No c/c/e  No rash grossly  FROM                          11.8   9.39  )-----------( 337      ( 14 May 2023 07:14 )             35.9       05-15    129<L>  |  96  |  32<H>  ----------------------------<  109<H>  4.8   |  26  |  1.02    Ca    7.9<L>      15 May 2023 06:51        
Vascular Cardiology Progress Note / Available on TEAMS    DIRECT PROVIDER NUMBER: [phone]     CC:  PE / DVT    Interval Events:    No reported C/P or SOB.  Reports feeling improved on ambulation.   LE edema noted.  Reports planned for possible paracentesis today.     Allergies  No Known Allergies    MEDICATIONS  (STANDING):  chlorhexidine 2% Cloths 1 Application(s) Topical daily  tamsulosin 0.4 milliGRAM(s) Oral daily      PAST MEDICAL & SURGICAL HISTORY:  Cholangiocarcinoma  Disseminated malignant neoplasm  Pulmonary embolism  Prostate cancer  History of prostate cancer    FAMILY HISTORY:  No pertinent family history in first degree relatives    SOCIAL HISTORY:  unchanged    REVIEW OF SYSTEMS:  CONSTITUTIONAL: No fatigue  EYES: No eye pain  ENT:  No throat pain  NECK: No pain   RESPIRATORY: No current SOB  CARDIOVASCULAR: No C/P  GASTROINTESTINAL: No abdominal pain  GENITOURINARY: No hematuria  NEUROLOGICAL: No memory loss  SKIN: No LE wounds  LYMPH Nodes: No enlarged glands noted  ENDOCRINE: No heat or cold intolerance noted  MUSCULOSKELETAL: LE edema    PSYCHIATRIC: No depression, anxiety  HEME/LYMPH: No  bleeding gums  ALLERGY AND IMMUNOLOGIC: No hives    [ x] All others negative	    PHYSICAL EXAM:  Vital Signs Last 24 Hrs  T(C): 36.3 (09 May 2023 08:09), Max: 37 (08 May 2023 21:21)  T(F): 97.4 (09 May 2023 08:09), Max: 98.6 (08 May 2023 21:21)  HR: 103 (09 May 2023 08:09) (99 - 108)  BP: 107/75 (09 May 2023 08:09) (107/75 - 123/77)  RR: 18 (09 May 2023 08:09) (18 - 18)  SpO2: 96% (09 May 2023 08:09) (94% - 98%)    Parameters below as of 09 May 2023 08:09  Patient On (Oxygen Delivery Method): room air    Appearance: NAD 	  HEENT:  NC/AT  Cardiovascular: Tachycardic, S1 and S2  Respiratory: CTA B/L  Psychiatry:  AAO x 3  Gastrointestinal:  Soft,+ BS	  Skin: No cyanosis	  Neurologic: No focal deficits   Extremities: Bilateral LE edema, Calves soft      LABS:	 	               05-09    126<L>  |  90<L>  |  41<H>  ----------------------------<  116<H>  4.4   |  24  |  0.94    Ca    8.6      09 May 2023 06:03      Assessment:  1. Segmental and Subsegmental PE on outpatient CTA       Troponin negative, BNP down trending        Mildly enlarged RV  2. Cholangiocarcinoma with metastasis  3. R Soleal and Tibial peroneal trunk DVT at duplex at [facility]  4. Hyponatremia  5. Prostate CA  6. Ascites     Plan:  1. Can briefly hold therapeutic Lovenox for paracentesis if needed.      Resume post procedure when safe from IR perspective.  2. Patient reports feeling improved on ambulation.  3. Avoid exertional activity at this time.   4. Appreciate excellent care by Primary Team. Appreciate IR and Heme-Onc.     Thank you  LUCAS Dunbar, MS, [facility]  Vascular Cardiology Service    Please call with any questions:   DIRECT SERVICE NUMBER: [phone]  / Available on TEAMS
[place] [facility]   [phone]  RENAL FOLLOW UP NOTE  --------------------------------------------------------------------------------  Chief Complaint:    24 hour events/subjective:    seen earlier   s/p paracentesis     PAST HISTORY  --------------------------------------------------------------------------------  No significant changes to PMH, PSH, FHx, SHx, unless otherwise noted    ALLERGIES & MEDICATIONS  --------------------------------------------------------------------------------  Allergies    No Known Allergies    Intolerances      Standing Inpatient Medications  chlorhexidine 2% Cloths 1 Application(s) Topical daily  enoxaparin Injectable 100 milliGRAM(s) SubCutaneous every 24 hours  tamsulosin 0.4 milliGRAM(s) Oral daily    PRN Inpatient Medications      REVIEW OF SYSTEMS  --------------------------------------------------------------------------------    Gen: denies  fevers/chills,  CVS: denies chest pain/palpitations  Resp: denies SOB/Cough  GI: Denies N/V/Abd pain  : Denies dysuria/oliguria/hematuria        VITALS/PHYSICAL EXAM  --------------------------------------------------------------------------------  T(C): 36.7 (05-10-23 @ 20:58), Max: 36.7 (05-10-23 @ 20:58)  HR: 101 (05-10-23 @ 20:58) (96 - 101)  BP: 93/60 (05-10-23 @ 20:58) (93/60 - 106/66)  RR: 18 (05-10-23 @ 20:58) (18 - 18)  SpO2: 97% (05-10-23 @ 20:58) (95% - 97%)  Wt(kg): --        05-10-23 @ 07:01  -  05-10-23 @ 21:43  --------------------------------------------------------  IN: 360 mL / OUT: 1 mL / NET: 359 mL      Physical Exam:  	      	Gen: chronically ill appearing M   	Pulm: decrease bs  no rales or ronchi or wheezing  	CV: No JVD. RRR, S1S2; no rub  	Abd: +BS, soft, + ascites non tender   	: No suprapubic tenderness  	UE: Warm, no cyanosis  no clubbing,  no edema  	LE: Warm, no cyanosis  no clubbing, 3+  edema, thigh edema  	Neuro: alert and oriented. speech coherent       LABS/STUDIES  --------------------------------------------------------------------------------              10.4   9.22  >-----------<  216      [05-10-23 @ 06:39]              31.1     123  |  91  |  25  ----------------------------<  113      [05-10-23 @ 16:01]  4.5   |  22  |  0.85        Ca     7.7     [05-10-23 @ 16:01]            Creatinine Trend:  SCr 0.85 [05-10 @ 16:01]  SCr 0.87 [05-10 @ 06:39]  SCr 0.88 [05-09 @ 22:17]  SCr 0.95 [05-09 @ 13:44]  SCr 0.94 [05-09 @ 06:03]              Urinalysis - [05-04-23 @ 06:10]      Color Yellow / Appearance Clear / SG >1.050 / pH 6.0      Gluc Negative / Ketone Small  / Bili Negative / Urobili Negative       Blood Trace / Protein 30 mg/dL / Leuk Est Negative / Nitrite Negative      RBC 3 / WBC 1 / Hyaline 1 / Gran  / Sq Epi  / Non Sq Epi  / Bacteria Negative    Urine Sodium 176      [05-07-23 @ 13:48]  Urine Potassium 63      [05-07-23 @ 13:48]  Urine Osmolality 877      [05-07-23 @ 13:48]          
    Pt is seen and examined  Seen by renal for hyponatremia  no overnight events      PAST MEDICAL & SURGICAL HISTORY:  Cholangiocarcinoma      Disseminated malignant neoplasm      Pulmonary embolism      Prostate cancer      History of prostate cancer          ROS:  Negative except for:    MEDICATIONS  (STANDING):  chlorhexidine 2% Cloths 1 Application(s) Topical daily  enoxaparin Injectable 100 milliGRAM(s) SubCutaneous every 24 hours  tamsulosin 0.4 milliGRAM(s) Oral daily    MEDICATIONS  (PRN):      Allergies    No Known Allergies    Intolerances        Vital Signs Last 24 Hrs  T(C): 36.7 (07 May 2023 10:45), Max: 36.7 (07 May 2023 10:45)  T(F): 98.1 (07 May 2023 10:45), Max: 98.1 (07 May 2023 10:45)  HR: 91 (07 May 2023 10:45) (91 - 108)  BP: 92/58 (07 May 2023 10:45) (92/58 - 119/78)  BP(mean): --  RR: 18 (07 May 2023 10:45) (18 - 18)  SpO2: 95% (07 May 2023 10:45) (94% - 96%)    Parameters below as of 07 May 2023 10:45  Patient On (Oxygen Delivery Method): room air        PHYSICAL EXAM    NC/AT In NAD  Chest Clear Bilat  CVS S1,S2+ RRR  Abd Soft, NT/ND, + BS  Ext No edema      LABS:                          10.3   9.30  )-----------( 166      ( 07 May 2023 07:06 )             31.9     Serial CBC's  05-07 @ 07:06  Hct-31.9 / Hgb-10.3 / Plat-166 / RBC-3.53 / WBC-9.30          Serial CBC's  05-06 @ 06:33  Hct-34.7 / Hgb-11.6 / Plat-185 / RBC-3.87 / WBC-11.15            05-07    126<L>  |  92<L>  |  21  ----------------------------<  104<H>  3.8   |  24  |  0.92    Ca    8.1<L>      07 May 2023 07:05    TPro  4.8<L>  /  Alb  2.4<L>  /  TBili  0.5  /  DBili  x   /  AST  18  /  ALT  11  /  AlkPhos  96  05-07          WBC Count: 9.30 K/uL (05-07 @ 07:06)  Hemoglobin: 10.3 g/dL (05-07 @ 07:06)            RADIOLOGY & ADDITIONAL STUDIES:    
[facility]   [phone]  RENAL FOLLOW UP NOTE  --------------------------------------------------------------------------------  Chief Complaint:    24 hour events/subjective:    patient seen and examined.   denies sob    PAST HISTORY  --------------------------------------------------------------------------------  No significant changes to PMH, PSH, FHx, SHx, unless otherwise noted    ALLERGIES & MEDICATIONS  --------------------------------------------------------------------------------  Allergies    No Known Allergies    Intolerances      Standing Inpatient Medications  chlorhexidine 2% Cloths 1 Application(s) Topical daily  enoxaparin Injectable 100 milliGRAM(s) SubCutaneous every 24 hours  tamsulosin 0.4 milliGRAM(s) Oral daily  urea Oral Powder 15 Gram(s) Oral <User Schedule>    PRN Inpatient Medications      REVIEW OF SYSTEMS  --------------------------------------------------------------------------------    Gen: denies fevers/chills,  CVS: denies chest pain/palpitations  Resp: denies SOB/Cough  GI: Denies N/V/Abd pain  : Denies dysuria/oliguria/hematuria    VITALS/PHYSICAL EXAM  --------------------------------------------------------------------------------  T(C): 36.4 (05-08-23 @ 11:47), Max: 36.6 (05-08-23 @ 00:38)  HR: 105 (05-08-23 @ 11:47) (96 - 112)  BP: 109/68 (05-08-23 @ 11:47) (109/68 - 125/86)  RR: 18 (05-08-23 @ 11:47) (18 - 18)  SpO2: 98% (05-08-23 @ 11:47) (92% - 98%)  Wt(kg): --        05-07-23 @ 07:01  -  05-08-23 @ 07:00  --------------------------------------------------------  IN: 480 mL / OUT: 301 mL / NET: 179 mL      Physical Exam:  	  	Gen: chronically ill appearing M   	Pulm: decrease bs  no rales or ronchi or wheezing  	CV: No JVD. RRR, S1S2; no rub  	Abd: +BS, soft, + ascites non tender   	: No suprapubic tenderness  	UE: Warm, no cyanosis  no clubbing,  no edema  	LE: Warm, no cyanosis  no clubbing, 3+  edema, thigh edema  	Neuro: alert and oriented. speech coherent     LABS/STUDIES  --------------------------------------------------------------------------------              10.3   9.30  >-----------<  166      [05-07-23 @ 07:06]              31.9     126  |  89  |  21  ----------------------------<  151      [05-08-23 @ 12:11]  4.0   |  22  |  0.96    Na this am 123         Ca     8.1     [05-08-23 @ 12:11]    TPro  4.8  /  Alb  2.4  /  TBili  0.5  /  DBili  x   /  AST  18  /  ALT  11  /  AlkPhos  96  [05-07-23 @ 07:05]        Uric acid 6.4      [05-07-23 @ 07:05]  Serum Osmolality 266      [05-07-23 @ 07:07]    Creatinine Trend:  SCr 0.96 [05-08 @ 12:11]  SCr 0.92 [05-08 @ 07:37]  SCr 0.92 [05-07 @ 07:05]  SCr 0.99 [05-06 @ 06:33]  SCr 0.98 [05-05 @ 11:14]              Urinalysis - [05-04-23 @ 06:10]      Color Yellow / Appearance Clear / SG >1.050 / pH 6.0      Gluc Negative / Ketone Small  / Bili Negative / Urobili Negative       Blood Trace / Protein 30 mg/dL / Leuk Est Negative / Nitrite Negative      RBC 3 / WBC 1 / Hyaline 1 / Gran  / Sq Epi  / Non Sq Epi  / Bacteria Negative    Urine Sodium 176      [05-07-23 @ 13:48]  Urine Potassium 63      [05-07-23 @ 13:48]  Urine Osmolality 877      [05-07-23 @ 13:48]        
[facility]   [phone]  RENAL FOLLOW UP NOTE  --------------------------------------------------------------------------------  Chief Complaint:    24 hour events/subjective:    patient seen and examined.   decreased appetite  intermittent sob    PAST HISTORY  --------------------------------------------------------------------------------  No significant changes to PMH, PSH, FHx, SHx, unless otherwise noted    ALLERGIES & MEDICATIONS  --------------------------------------------------------------------------------  Allergies    No Known Allergies    Intolerances      Standing Inpatient Medications  chlorhexidine 2% Cloths 1 Application(s) Topical daily  tamsulosin 0.4 milliGRAM(s) Oral daily    PRN Inpatient Medications      REVIEW OF SYSTEMS  --------------------------------------------------------------------------------    Gen: denies fevers/chills,  CVS: denies chest pain/palpitations  Resp: +SOB/Cough-  GI: Denies N/V/Abd pain  : Denies dysuria    VITALS/PHYSICAL EXAM  --------------------------------------------------------------------------------  T(C): 36.3 (05-09-23 @ 11:34), Max: 37 (05-08-23 @ 21:21)  HR: 107 (05-09-23 @ 11:34) (99 - 108)  BP: 120/85 (05-09-23 @ 11:34) (107/75 - 123/77)  RR: 18 (05-09-23 @ 11:34) (18 - 18)  SpO2: 95% (05-09-23 @ 11:34) (94% - 98%)  Wt(kg): --        Physical Exam:  	  	Gen: chronically ill appearing M   	Pulm: decrease bs  no rales or ronchi or wheezing  	CV: No JVD. RRR, S1S2; no rub  	Abd: +BS, soft, + ascites non tender   	: No suprapubic tenderness  	UE: Warm, no cyanosis  no clubbing,  no edema  	LE: Warm, no cyanosis  no clubbing, 3+  edema, thigh edema  	Neuro: alert and oriented. speech coherent     LABS/STUDIES  --------------------------------------------------------------------------------    126  |  90  |  38  ----------------------------<  147      [05-09-23 @ 13:44]  4.1   |  22  |  0.95        Ca     8.3     [05-09-23 @ 13:44]            Creatinine Trend:  SCr 0.95 [05-09 @ 13:44]  SCr 0.94 [05-09 @ 06:03]  SCr 0.89 [05-08 @ 23:30]  SCr 0.74 [05-08 @ 22:33]  SCr 0.96 [05-08 @ 12:11]              Urinalysis - [05-04-23 @ 06:10]      Color Yellow / Appearance Clear / SG >1.050 / pH 6.0      Gluc Negative / Ketone Small  / Bili Negative / Urobili Negative       Blood Trace / Protein 30 mg/dL / Leuk Est Negative / Nitrite Negative      RBC 3 / WBC 1 / Hyaline 1 / Gran  / Sq Epi  / Non Sq Epi  / Bacteria Negative    Urine Sodium 176      [05-07-23 @ 13:48]  Urine Potassium 63      [05-07-23 @ 13:48]  Urine Osmolality 877      [05-07-23 @ 13:48]      < from: US Abdomen Limited (05.08.23 @ 17:21) >  ACC: [id number] EXAM:  US ABDOMEN LIMITED   ORDERED BY: LAUREN URBAN     PROCEDURE DATE:  05/08/2023          INTERPRETATION:  CLINICAL INFORMATION: Status post paracentesis on   04/27/2023 with increased abdominal girth.  Evaluate ascites.    COMPARISON: None available.    TECHNIQUE: Limited ultrasound of the abdomen to evaluate for ascites.    FINDINGS:  Right upper quadrant: Pocket of ascites measures up to 4.5 cm in depth.  Right lower quadrant: Pocket of ascites measures up to 9.5 cm in depth.    Left upper quadrant: Pocket of ascites measures up to 9 cm in depth.  Left lower quadrant: Pocket of ascites measures up to 6 cm in depth    Pelvis: Moderate ascites measuring up to 3 cm in depth.    Pleural effusions: Pleural effusions are partially visualized bilaterally.      IMPRESSION:  Moderate to severe four quadrant ascites.  Oral effusions are partially visualized bilaterally.    --- End of Report ---      < end of copied text >    
Patient is a 87y old  Male who presents with a chief complaint of Sent from PCP above for B/L PE noted on CTT angiogram outpatient and + DVT> (08 May 2023 15:45)    Patient seen and examined at bedside this morning. Resting in recliner, appears comfortable, no new complaints offered    MEDICATIONS  (STANDING):  chlorhexidine 2% Cloths 1 Application(s) Topical daily  enoxaparin Injectable 100 milliGRAM(s) SubCutaneous every 24 hours  tamsulosin 0.4 milliGRAM(s) Oral daily  urea Oral Powder 15 Gram(s) Oral <User Schedule>    MEDICATIONS  (PRN):    Vital Signs Last 24 Hrs  T(C): 36.4 (08 May 2023 11:47), Max: 36.6 (08 May 2023 00:38)  T(F): 97.5 (08 May 2023 11:47), Max: 97.8 (08 May 2023 00:38)  HR: 105 (08 May 2023 11:47) (96 - 112)  BP: 109/68 (08 May 2023 11:47) (109/68 - 125/86)  BP(mean): --  RR: 18 (08 May 2023 11:47) (18 - 18)  SpO2: 98% (08 May 2023 11:47) (92% - 98%)    Parameters below as of 08 May 2023 11:47  Patient On (Oxygen Delivery Method): room air        PE  NAD  Awake, alert  Anicteric, MMM  RRR  CTAB  Abd soft, NT, ND  No c/c  +b/l HUBERT  No rash grossly  FROM                          10.3   9.30  )-----------( 166      ( 07 May 2023 07:06 )             31.9       05-08    126<L>  |  89<L>  |  21  ----------------------------<  151<H>  4.0   |  22  |  0.96    Ca    8.1<L>      08 May 2023 12:11    TPro  4.8<L>  /  Alb  2.4<L>  /  TBili  0.5  /  DBili  x   /  AST  18  /  ALT  11  /  AlkPhos  96  05-07      
Patient is a 87y old  Male who presents with a chief complaint of Sent from PCP above for B/L PE noted on CTT angiogram outpatient and + DVT> (11 May 2023 10:34)    Pt seen and examined at bedside. No new complaints.     MEDICATIONS  (STANDING):  chlorhexidine 2% Cloths 1 Application(s) Topical daily  enoxaparin Injectable 100 milliGRAM(s) SubCutaneous every 24 hours  tamsulosin 0.4 milliGRAM(s) Oral daily    MEDICATIONS  (PRN):      Vital Signs Last 24 Hrs  T(C): 36.6 (11 May 2023 11:44), Max: 36.7 (10 May 2023 20:58)  T(F): 97.8 (11 May 2023 11:44), Max: 98 (10 May 2023 20:58)  HR: 102 (11 May 2023 11:44) (93 - 102)  BP: 105/67 (11 May 2023 11:44) (93/60 - 105/67)  BP(mean): --  RR: 18 (11 May 2023 11:44) (18 - 18)  SpO2: 97% (11 May 2023 11:44) (96% - 97%)    Parameters below as of 11 May 2023 11:44  Patient On (Oxygen Delivery Method): room air        PE  NAD  Awake, alert  Anicteric, MMM  No c/c/e  No rash grossly  FROM                          10.4   9.22  )-----------( 216      ( 10 May 2023 06:39 )             31.1       05-11    125<L>  |  93<L>  |  22  ----------------------------<  110<H>  4.5   |  24  |  0.91    Ca    7.7<L>      11 May 2023 06:44        
Patient seen and examined at bedside. overall feels well. low appetite     MEDICATIONS  (STANDING):  chlorhexidine 2% Cloths 1 Application(s) Topical daily  enoxaparin Injectable 100 milliGRAM(s) SubCutaneous every 24 hours  polyethylene glycol 3350 17 Gram(s) Oral once  tamsulosin 0.4 milliGRAM(s) Oral daily    MEDICATIONS  (PRN):        Vital Signs Last 24 Hrs  T(C): 36.4 (14 May 2023 05:15), Max: 36.5 (13 May 2023 11:53)  T(F): 97.5 (14 May 2023 05:15), Max: 97.7 (13 May 2023 11:53)  HR: 103 (14 May 2023 05:15) (101 - 103)  BP: 117/65 (14 May 2023 05:15) (103/68 - 117/65)  BP(mean): --  RR: 18 (14 May 2023 05:15) (18 - 18)  SpO2: 96% (14 May 2023 05:15) (96% - 98%)    Parameters below as of 14 May 2023 05:15  Patient On (Oxygen Delivery Method): room air          PHYSICAL EXAM:     GENERAL:  Appears stated age, well-groomed  CHEST:  CTA b/l  HEART:  S1 s2+   ABDOMEN:  Soft, non-tender,   EXTEREMITIES:  no cyanosis,clubbing or edema  NEURO:  Alert, oriented, no asterixis                            11.8   9.39  )-----------( 337      ( 14 May 2023 07:14 )             35.9       05-13    129<L>  |  94<L>  |  23  ----------------------------<  116<H>  4.9   |  25  |  0.96    Ca    7.9<L>      13 May 2023 06:17        
Pt is seen and examined  pt is awake and sitting up eating breakfast  feels well w no complaints  no cp or sob  ambulating w walker    PAST MEDICAL & SURGICAL HISTORY:  Cholangiocarcinoma      Disseminated malignant neoplasm      Pulmonary embolism      Prostate cancer      History of prostate cancer          ROS:  Negative except for:    MEDICATIONS  (STANDING):  chlorhexidine 2% Cloths 1 Application(s) Topical daily  enoxaparin Injectable 100 milliGRAM(s) SubCutaneous every 24 hours  tamsulosin 0.4 milliGRAM(s) Oral daily    MEDICATIONS  (PRN):      Allergies    No Known Allergies    Intolerances        Vital Signs Last 24 Hrs  T(C): 36.6 (06 May 2023 04:13), Max: 36.6 (05 May 2023 11:53)  T(F): 97.8 (06 May 2023 04:13), Max: 97.9 (05 May 2023 11:53)  HR: 104 (06 May 2023 04:13) (104 - 112)  BP: 120/78 (06 May 2023 04:13) (117/82 - 127/86)  BP(mean): --  RR: 18 (06 May 2023 04:13) (18 - 20)  SpO2: 97% (06 May 2023 04:13) (96% - 100%)    Parameters below as of 06 May 2023 04:13  Patient On (Oxygen Delivery Method): room air        PHYSICAL EXAM    NC/AT In NAD  Chest Clear Bilat  CVS S1,S2+ RRR  Abd Soft, NT/ND, + BS  Ext No edema      LABS:                          11.6   11.15 )-----------( 185      ( 06 May 2023 06:33 )             34.7     Serial CBC's  05-06 @ 06:33  Hct-34.7 / Hgb-11.6 / Plat-185 / RBC-3.87 / WBC-11.15          Serial CBC's  05-05 @ 11:14  Hct-37.3 / Hgb-12.1 / Plat-195 / RBC-4.12 / WBC-10.82            05-06    127<L>  |  92<L>  |  22  ----------------------------<  118<H>  4.2   |  22  |  0.99    Ca    8.5      06 May 2023 06:33        PTT - ( 04 May 2023 09:59 )  PTT:64.8 sec    WBC Count: 11.15 K/uL (05-06 @ 06:33)  Hemoglobin: 11.6 g/dL (05-06 @ 06:33)            RADIOLOGY & ADDITIONAL STUDIES:    
    SUBJECTIVE / OVERNIGHT EVENTS:pt seen and examined  05-05-23     =MEDICATIONS  (STANDING):  chlorhexidine 2% Cloths 1 Application(s) Topical daily  enoxaparin Injectable 100 milliGRAM(s) SubCutaneous every 24 hours  tamsulosin 0.4 milliGRAM(s) Oral daily    MEDICATIONS  (PRN):    Vital Signs Last 24 Hrs  T(C): 36.5 (05-05-23 @ 20:06), Max: 36.6 (05-05-23 @ 04:00)  T(F): 97.7 (05-05-23 @ 20:06), Max: 97.9 (05-05-23 @ 11:53)  HR: 112 (05-05-23 @ 20:06) (101 - 112)  BP: 127/86 (05-05-23 @ 20:06) (110/75 - 127/86)  BP(mean): --  RR: 20 (05-05-23 @ 20:06) (16 - 20)  SpO2: 96% (05-05-23 @ 20:06) (94% - 100%)            Constitutional: No fever, fatigue  Skin: No rash.  Eyes: No recent vision problems or eye pain.  ENT: No congestion, ear pain, or sore throat.  Cardiovascular: No chest pain or palpation.  Respiratory: No cough, shortness of breath, congestion, or wheezing.  Gastrointestinal: No abdominal pain, nausea, vomiting, or diarrhea.  Genitourinary: No dysuria.  Musculoskeletal: No joint swelling.  Neurologic: No headache.    PHYSICAL EXAM:  GENERAL: NAD  EYES: EOMI, PERRLA  NECK: Supple, No JVD  CHEST/LUNG: dec breath sounds at bases  HEART:  S1 , S2 +  ABDOMEN: soft , bs+  EXTREMITIES:  edema+  NEUROLOGY:alert awake      LABS:  05-05    128<L>  |  91<L>  |  18  ----------------------------<  189<H>  4.1   |  20<L>  |  0.98    Ca    8.6      05 May 2023 11:14      Creatinine Trend: 0.98 <--, 0.93 <--, 0.96 <--                        12.1   10.82 )-----------( 195      ( 05 May 2023 11:14 )             37.3     Urine Studies:  Urinalysis Basic - ( 04 May 2023 06:10 )    Color: Yellow / Appearance: Clear / SG: >1.050 / pH:   Gluc:  / Ketone: Small  / Bili: Negative / Urobili: Negative   Blood:  / Protein: 30 mg/dL / Nitrite: Negative   Leuk Esterase: Negative / RBC: 3 /hpf / WBC 1 /HPF   Sq Epi:  / Non Sq Epi:  / Bacteria: Negative                PTT - ( 04 May 2023 09:59 )  PTT:64.8 sec        RADIOLOGY & ADDITIONAL TESTS:    Imaging Personally Reviewed:yes    Consultant(s) Notes Reviewed:  yes    Care Discussed with Consultants/Other Providers:yes  
    SUBJECTIVE / OVERNIGHT EVENTS:pt seen and examined  05-10-23     MEDICATIONS  (STANDING):  chlorhexidine 2% Cloths 1 Application(s) Topical daily  enoxaparin Injectable 100 milliGRAM(s) SubCutaneous every 24 hours  tamsulosin 0.4 milliGRAM(s) Oral daily  Vital Signs Last 24 Hrs  T(C): 36.7 (05-10-23 @ 20:58), Max: 36.7 (05-10-23 @ 20:58)  T(F): 98 (05-10-23 @ 20:58), Max: 98 (05-10-23 @ 20:58)  HR: 101 (05-10-23 @ 20:58) (96 - 101)  BP: 93/60 (05-10-23 @ 20:58) (93/60 - 106/66)  BP(mean): --  RR: 18 (05-10-23 @ 20:58) (18 - 18)  SpO2: 97% (05-10-23 @ 20:58) (95% - 97%)      MEDICATIONS  (PRN):      Constitutional: No fever, fatigue  Skin: No rash.  Eyes: No recent vision problems or eye pain.  ENT: No congestion, ear pain, or sore throat.  Cardiovascular: No chest pain or palpation.  Respiratory: No cough, shortness of breath, congestion, or wheezing.  Gastrointestinal: No abdominal pain, nausea, vomiting, or diarrhea.  Genitourinary: No dysuria.  Musculoskeletal: No joint swelling.  Neurologic: No headache.    PHYSICAL EXAM:  GENERAL: NAD  EYES: EOMI, PERRLA  NECK: Supple, No JVD  CHEST/LUNG: dec breath sounds at bases  HEART:  S1 , S2 +  ABDOMEN: soft , bs+, distension+  EXTREMITIES:  edema+  NEUROLOGY:alert awake    LABS:  05-10    125<L>  |  91<L>  |  23  ----------------------------<  119<H>  4.5   |  23  |  0.89    Ca    7.7<L>      10 May 2023 21:50      Creatinine Trend: 0.89 <--, 0.85 <--, 0.87 <--, 0.88 <--, 0.95 <--, 0.94 <--, 0.89 <--, 0.74 <--, 0.96 <--, 0.92 <--, 0.92 <--, 0.99 <--, 0.98 <--, 0.93 <--                        10.4   9.22  )-----------( 216      ( 10 May 2023 06:39 )             31.1     Urine Studies:  Urinalysis Basic - ( 04 May 2023 06:10 )    Color: Yellow / Appearance: Clear / SG: >1.050 / pH:   Gluc:  / Ketone: Small  / Bili: Negative / Urobili: Negative   Blood:  / Protein: 30 mg/dL / Nitrite: Negative   Leuk Esterase: Negative / RBC: 3 /hpf / WBC 1 /HPF   Sq Epi:  / Non Sq Epi:  / Bacteria: Negative      Sodium, Random Urine: 176 mmol/L (05-07 @ 13:48)  Potassium, Random Urine: 63 mmol/L (05-07 @ 13:48)  Osmolality, Random Urine: 877 mos/kg (05-07 @ 13:48)                                PTT - ( 04 May 2023 09:59 )  PTT:64.8 sec        RADIOLOGY & ADDITIONAL TESTS:    Imaging Personally Reviewed:yes    Consultant(s) Notes Reviewed:  yes    Care Discussed with Consultants/Other Providers:yes  
    SUBJECTIVE / OVERNIGHT EVENTS:pt seen and examined  05-11-23     MEDICATIONS  (STANDING):  chlorhexidine 2% Cloths 1 Application(s) Topical daily  enoxaparin Injectable 100 milliGRAM(s) SubCutaneous every 24 hours  tamsulosin 0.4 milliGRAM(s) Oral daily    MEDICATIONS  (PRN):    Vital Signs Last 24 Hrs  T(C): 36.7 (05-11-23 @ 20:54), Max: 36.7 (05-11-23 @ 04:32)  T(F): 98.1 (05-11-23 @ 20:54), Max: 98.1 (05-11-23 @ 20:54)  HR: 100 (05-11-23 @ 20:54) (93 - 102)  BP: 107/73 (05-11-23 @ 20:54) (102/63 - 107/73)  BP(mean): --  RR: 18 (05-11-23 @ 20:54) (18 - 18)  SpO2: 96% (05-11-23 @ 20:54) (96% - 97%)        Constitutional: No fever, fatigue  Skin: No rash.  Eyes: No recent vision problems or eye pain.  ENT: No congestion, ear pain, or sore throat.  Cardiovascular: No chest pain or palpation.  Respiratory: No cough, shortness of breath, congestion, or wheezing.  Gastrointestinal: No abdominal pain, nausea, vomiting, or diarrhea.  Genitourinary: No dysuria.  Musculoskeletal: No joint swelling.  Neurologic: No headache.    PHYSICAL EXAM:  GENERAL: NAD  EYES: EOMI, PERRLA  NECK: Supple, No JVD  CHEST/LUNG: dec breath sounds at bases  HEART:  S1 , S2 +  ABDOMEN: soft , bs+, distension+  EXTREMITIES:  edema+  NEUROLOGY:alert awake    LABS:  05-11    125<L>  |  91<L>  |  25<H>  ----------------------------<  141<H>  4.5   |  24  |  1.00    Ca    7.7<L>      11 May 2023 16:56      Creatinine Trend: 1.00 <--, 0.91 <--, 0.89 <--, 0.85 <--, 0.87 <--, 0.88 <--, 0.95 <--, 0.94 <--, 0.89 <--, 0.74 <--, 0.96 <--, 0.92 <--, 0.92 <--, 0.99 <--, 0.98 <--                        10.4   9.22  )-----------( 216      ( 10 May 2023 06:39 )             31.1     Urine Studies:    Sodium, Random Urine: 176 mmol/L (05-07 @ 13:48)  Potassium, Random Urine: 63 mmol/L (05-07 @ 13:48)  Osmolality, Random Urine: 877 mos/kg (05-07 @ 13:48)                                  PTT - ( 04 May 2023 09:59 )  PTT:64.8 sec        RADIOLOGY & ADDITIONAL TESTS:    Imaging Personally Reviewed:yes    Consultant(s) Notes Reviewed:  yes    Care Discussed with Consultants/Other Providers:yes  
    SUBJECTIVE / OVERNIGHT EVENTS:pt seen and examined  05-09-23     MEDICATIONS  (STANDING):  chlorhexidine 2% Cloths 1 Application(s) Topical daily  enoxaparin Injectable 100 milliGRAM(s) SubCutaneous every 24 hours  tamsulosin 0.4 milliGRAM(s) Oral daily    MEDICATIONS  (PRN):    Vital Signs Last 24 Hrs  T(C): 36.4 (05-09-23 @ 20:53), Max: 36.7 (05-09-23 @ 00:50)  T(F): 97.6 (05-09-23 @ 20:53), Max: 98.1 (05-09-23 @ 00:50)  HR: 90 (05-09-23 @ 20:53) (90 - 108)  BP: 110/66 (05-09-23 @ 20:53) (97/65 - 120/85)  BP(mean): --  RR: 18 (05-09-23 @ 20:53) (18 - 18)  SpO2: 97% (05-09-23 @ 20:53) (95% - 99%)      Constitutional: No fever, fatigue  Skin: No rash.  Eyes: No recent vision problems or eye pain.  ENT: No congestion, ear pain, or sore throat.  Cardiovascular: No chest pain or palpation.  Respiratory: No cough, shortness of breath, congestion, or wheezing.  Gastrointestinal: No abdominal pain, nausea, vomiting, or diarrhea.  Genitourinary: No dysuria.  Musculoskeletal: No joint swelling.  Neurologic: No headache.    PHYSICAL EXAM:  GENERAL: NAD  EYES: EOMI, PERRLA  NECK: Supple, No JVD  CHEST/LUNG: dec breath sounds at bases  HEART:  S1 , S2 +  ABDOMEN: soft , bs+, distension+  EXTREMITIES:  edema+  NEUROLOGY:alert awake      LABS:  05-09    126<L>  |  94<L>  |  32<H>  ----------------------------<  97  4.1   |  23  |  0.88    Ca    7.7<L>      09 May 2023 22:17      Creatinine Trend: 0.88 <--, 0.95 <--, 0.94 <--, 0.89 <--, 0.74 <--, 0.96 <--, 0.92 <--, 0.92 <--, 0.99 <--, 0.98 <--, 0.93 <--, 0.96 <--    Urine Studies:  Urinalysis Basic - ( 04 May 2023 06:10 )    Color: Yellow / Appearance: Clear / SG: >1.050 / pH:   Gluc:  / Ketone: Small  / Bili: Negative / Urobili: Negative   Blood:  / Protein: 30 mg/dL / Nitrite: Negative   Leuk Esterase: Negative / RBC: 3 /hpf / WBC 1 /HPF   Sq Epi:  / Non Sq Epi:  / Bacteria: Negative      Sodium, Random Urine: 176 mmol/L (05-07 @ 13:48)  Potassium, Random Urine: 63 mmol/L (05-07 @ 13:48)  Osmolality, Random Urine: 877 mos/kg (05-07 @ 13:48)                          PTT - ( 04 May 2023 09:59 )  PTT:64.8 sec        RADIOLOGY & ADDITIONAL TESTS:    Imaging Personally Reviewed:yes    Consultant(s) Notes Reviewed:  yes    Care Discussed with Consultants/Other Providers:yes  
    SUBJECTIVE / OVERNIGHT EVENTS:pt seen and examined  05-07-23     MEDICATIONS  (STANDING):  chlorhexidine 2% Cloths 1 Application(s) Topical daily  enoxaparin Injectable 100 milliGRAM(s) SubCutaneous every 24 hours  tamsulosin 0.4 milliGRAM(s) Oral daily    MEDICATIONS  (PRN):    Vital Signs Last 24 Hrs  T(C): 36.3 (05-07-23 @ 20:23), Max: 36.7 (05-07-23 @ 10:45)  T(F): 97.3 (05-07-23 @ 20:23), Max: 98.1 (05-07-23 @ 10:45)  HR: 112 (05-07-23 @ 20:23) (91 - 112)  BP: 123/86 (05-07-23 @ 20:23) (92/58 - 123/86)  BP(mean): --  RR: 18 (05-07-23 @ 20:23) (18 - 18)  SpO2: 96% (05-07-23 @ 20:23) (94% - 96%)        Constitutional: No fever, fatigue  Skin: No rash.  Eyes: No recent vision problems or eye pain.  ENT: No congestion, ear pain, or sore throat.  Cardiovascular: No chest pain or palpation.  Respiratory: No cough, shortness of breath, congestion, or wheezing.  Gastrointestinal: No abdominal pain, nausea, vomiting, or diarrhea.  Genitourinary: No dysuria.  Musculoskeletal: No joint swelling.  Neurologic: No headache.    PHYSICAL EXAM:  GENERAL: NAD  EYES: EOMI, PERRLA  NECK: Supple, No JVD  CHEST/LUNG: dec breath sounds at bases  HEART:  S1 , S2 +  ABDOMEN: soft , bs+  EXTREMITIES:  edema+  NEUROLOGY:alert awake      LLABS:  05-07    126<L>  |  92<L>  |  21  ----------------------------<  104<H>  3.8   |  24  |  0.92    Ca    8.1<L>      07 May 2023 07:05    TPro  4.8<L>  /  Alb  2.4<L>  /  TBili  0.5  /  DBili      /  AST  18  /  ALT  11  /  AlkPhos  96  05-07    Creatinine Trend: 0.92 <--, 0.99 <--, 0.98 <--, 0.93 <--, 0.96 <--                        10.3   9.30  )-----------( 166      ( 07 May 2023 07:06 )             31.9     Urine Studies:  Urinalysis Basic - ( 04 May 2023 06:10 )    Color: Yellow / Appearance: Clear / SG: >1.050 / pH:   Gluc:  / Ketone: Small  / Bili: Negative / Urobili: Negative   Blood:  / Protein: 30 mg/dL / Nitrite: Negative   Leuk Esterase: Negative / RBC: 3 /hpf / WBC 1 /HPF   Sq Epi:  / Non Sq Epi:  / Bacteria: Negative      Sodium, Random Urine: 176 mmol/L (05-07 @ 13:48)  Potassium, Random Urine: 63 mmol/L (05-07 @ 13:48)  Osmolality, Random Urine: 877 mos/kg (05-07 @ 13:48)          LIVER FUNCTIONS - ( 07 May 2023 07:05 )  Alb: 2.4 g/dL / Pro: 4.8 g/dL / ALK PHOS: 96 U/L / ALT: 11 U/L / AST: 18 U/L / GGT: x                     Color: Yellow / Appearance: Clear / SG: >1.050 / pH:   Gluc:  / Ketone: Small  / Bili: Negative / Urobili: Negative   Blood:  / Protein: 30 mg/dL / Nitrite: Negative   Leuk Esterase: Negative / RBC: 3 /hpf / WBC 1 /HPF   Sq Epi:  / Non Sq Epi:  / Bacteria: Negative                PTT - ( 04 May 2023 09:59 )  PTT:64.8 sec        RADIOLOGY & ADDITIONAL TESTS:    Imaging Personally Reviewed:yes    Consultant(s) Notes Reviewed:  yes    Care Discussed with Consultants/Other Providers:yes  
    SUBJECTIVE / OVERNIGHT EVENTS:pt seen and examined  05-13-23     MEDICATIONS  (STANDING):  chlorhexidine 2% Cloths 1 Application(s) Topical daily  enoxaparin Injectable 100 milliGRAM(s) SubCutaneous every 24 hours  polyethylene glycol 3350 17 Gram(s) Oral once  tamsulosin 0.4 milliGRAM(s) Oral daily    MEDICATIONS  (PRN):    Vital Signs Last 24 Hrs  T(C): 36.4 (05-14-23 @ 00:30), Max: 36.6 (05-13-23 @ 03:58)  T(F): 97.6 (05-14-23 @ 00:30), Max: 97.8 (05-13-23 @ 03:58)  HR: 101 (05-14-23 @ 00:30) (99 - 103)  BP: 103/68 (05-14-23 @ 00:30) (103/68 - 110/76)  BP(mean): --  RR: 18 (05-14-23 @ 00:30) (18 - 18)  SpO2: 98% (05-14-23 @ 00:30) (96% - 99%)    RR: 18 (05-12-23 @ 20:32) (18 - 18)  SpO2: 96% (05-12-23 @ 20:32) (96% - 100%)          Constitutional: No fever, fatigue  Skin: No rash.  Eyes: No recent vision problems or eye pain.  ENT: No congestion, ear pain, or sore throat.  Cardiovascular: No chest pain or palpation.  Respiratory: No cough, shortness of breath, congestion, or wheezing.  Gastrointestinal: No abdominal pain, nausea, vomiting, or diarrhea.  Genitourinary: No dysuria.  Musculoskeletal: No joint swelling.  Neurologic: No headache.    PHYSICAL EXAM:  GENERAL: NAD  EYES: EOMI, PERRLA  NECK: Supple, No JVD  CHEST/LUNG: dec breath sounds at bases  HEART:  S1 , S2 +  ABDOMEN: soft , bs+, distension+  EXTREMITIES:  edema+  NEUROLOGY:alert awake    LABS:  05-13    129<L>  |  94<L>  |  23  ----------------------------<  116<H>  4.9   |  25  |  0.96    Ca    7.9<L>      13 May 2023 06:17    TPro  4.5<L>  /  Alb  1.7<L>  /  TBili  0.3  /  DBili      /  AST  17  /  ALT  12  /  AlkPhos  111  05-12    Creatinine Trend: 0.96 <--, 0.92 <--, 0.96 <--, 0.84 <--, 0.90 <--, 1.00 <--, 0.91 <--, 0.89 <--, 0.85 <--, 0.87 <--, 0.88 <--, 0.95 <--, 0.94 <--, 0.89 <--, 0.74 <--, 0.96 <--, 0.92 <--, 0.92 <--    Urine Studies:    Sodium, Random Urine: 176 mmol/L (05-07 @ 13:48)  Potassium, Random Urine: 63 mmol/L (05-07 @ 13:48)  Osmolality, Random Urine: 877 mos/kg (05-07 @ 13:48)          LIVER FUNCTIONS - ( 12 May 2023 06:16 )  Alb: 1.7 g/dL / Pro: 4.5 g/dL / ALK PHOS: 111 U/L / ALT: 12 U/L / AST: 17 U/L / GGT: x                                   PTT - ( 04 May 2023 09:59 )  PTT:64.8 sec        RADIOLOGY & ADDITIONAL TESTS:    Imaging Personally Reviewed:yes    Consultant(s) Notes Reviewed:  yes    Care Discussed with Consultants/Other Providers:yes  
    SUBJECTIVE / OVERNIGHT EVENTS:pt seen and examined  05-06-23     MEDICATIONS  (STANDING):  chlorhexidine 2% Cloths 1 Application(s) Topical daily  enoxaparin Injectable 100 milliGRAM(s) SubCutaneous every 24 hours  tamsulosin 0.4 milliGRAM(s) Oral daily    MEDICATIONS  (PRN):    Vital Signs Last 24 Hrs  T(C): 36.6 (05-06-23 @ 20:15), Max: 36.6 (05-06-23 @ 04:13)  T(F): 97.9 (05-06-23 @ 20:15), Max: 97.9 (05-06-23 @ 20:15)  HR: 106 (05-06-23 @ 20:15) (104 - 108)  BP: 119/78 (05-06-23 @ 20:15) (105/72 - 120/78)  BP(mean): --  RR: 18 (05-06-23 @ 20:15) (18 - 18)  SpO2: 96% (05-06-23 @ 20:15) (94% - 97%)            Constitutional: No fever, fatigue  Skin: No rash.  Eyes: No recent vision problems or eye pain.  ENT: No congestion, ear pain, or sore throat.  Cardiovascular: No chest pain or palpation.  Respiratory: No cough, shortness of breath, congestion, or wheezing.  Gastrointestinal: No abdominal pain, nausea, vomiting, or diarrhea.  Genitourinary: No dysuria.  Musculoskeletal: No joint swelling.  Neurologic: No headache.    PHYSICAL EXAM:  GENERAL: NAD  EYES: EOMI, PERRLA  NECK: Supple, No JVD  CHEST/LUNG: dec breath sounds at bases  HEART:  S1 , S2 +  ABDOMEN: soft , bs+  EXTREMITIES:  edema+  NEUROLOGY:alert awake      LABS:  05-06    127<L>  |  92<L>  |  22  ----------------------------<  118<H>  4.2   |  22  |  0.99    Ca    8.5      06 May 2023 06:33      Creatinine Trend: 0.99 <--, 0.98 <--, 0.93 <--, 0.96 <--                        11.6   11.15 )-----------( 185      ( 06 May 2023 06:33 )             34.7     Urine Studies:  Urinalysis Basic - ( 04 May 2023 06:10 )    Color: Yellow / Appearance: Clear / SG: >1.050 / pH:   Gluc:  / Ketone: Small  / Bili: Negative / Urobili: Negative   Blood:  / Protein: 30 mg/dL / Nitrite: Negative   Leuk Esterase: Negative / RBC: 3 /hpf / WBC 1 /HPF   Sq Epi:  / Non Sq Epi:  / Bacteria: Negative                    Color: Yellow / Appearance: Clear / SG: >1.050 / pH:   Gluc:  / Ketone: Small  / Bili: Negative / Urobili: Negative   Blood:  / Protein: 30 mg/dL / Nitrite: Negative   Leuk Esterase: Negative / RBC: 3 /hpf / WBC 1 /HPF   Sq Epi:  / Non Sq Epi:  / Bacteria: Negative                PTT - ( 04 May 2023 09:59 )  PTT:64.8 sec        RADIOLOGY & ADDITIONAL TESTS:    Imaging Personally Reviewed:yes    Consultant(s) Notes Reviewed:  yes    Care Discussed with Consultants/Other Providers:yes  
    SUBJECTIVE / OVERNIGHT EVENTS:pt seen and examined  05-12-23     MEDICATIONS  (STANDING):  chlorhexidine 2% Cloths 1 Application(s) Topical daily  enoxaparin Injectable 100 milliGRAM(s) SubCutaneous every 24 hours  polyethylene glycol 3350 17 Gram(s) Oral once  tamsulosin 0.4 milliGRAM(s) Oral daily    MEDICATIONS  (PRN):    Vital Signs Last 24 Hrs  T(C): 36.4 (05-12-23 @ 20:32), Max: 37.1 (05-12-23 @ 00:58)  T(F): 97.6 (05-12-23 @ 20:32), Max: 98.7 (05-12-23 @ 00:58)  HR: 107 (05-12-23 @ 20:32) (90 - 107)  BP: 118/86 (05-12-23 @ 20:32) (98/66 - 118/86)  BP(mean): --  RR: 18 (05-12-23 @ 20:32) (18 - 18)  SpO2: 96% (05-12-23 @ 20:32) (96% - 100%)          Constitutional: No fever, fatigue  Skin: No rash.  Eyes: No recent vision problems or eye pain.  ENT: No congestion, ear pain, or sore throat.  Cardiovascular: No chest pain or palpation.  Respiratory: No cough, shortness of breath, congestion, or wheezing.  Gastrointestinal: No abdominal pain, nausea, vomiting, or diarrhea.  Genitourinary: No dysuria.  Musculoskeletal: No joint swelling.  Neurologic: No headache.    PHYSICAL EXAM:  GENERAL: NAD  EYES: EOMI, PERRLA  NECK: Supple, No JVD  CHEST/LUNG: dec breath sounds at bases  HEART:  S1 , S2 +  ABDOMEN: soft , bs+, distension+  EXTREMITIES:  edema+  NEUROLOGY:alert awake    LABS:  05-12    127<L>  |  91<L>  |  25<H>  ----------------------------<  143<H>  5.1   |  24  |  0.96    Ca    8.2<L>      12 May 2023 17:22    TPro  4.5<L>  /  Alb  1.7<L>  /  TBili  0.3  /  DBili      /  AST  17  /  ALT  12  /  AlkPhos  111  05-12    Creatinine Trend: 0.96 <--, 0.84 <--, 0.90 <--, 1.00 <--, 0.91 <--, 0.89 <--, 0.85 <--, 0.87 <--, 0.88 <--, 0.95 <--, 0.94 <--, 0.89 <--, 0.74 <--, 0.96 <--, 0.92 <--, 0.92 <--, 0.99 <--    Urine Studies:    Sodium, Random Urine: 176 mmol/L (05-07 @ 13:48)  Potassium, Random Urine: 63 mmol/L (05-07 @ 13:48)  Osmolality, Random Urine: 877 mos/kg (05-07 @ 13:48)          LIVER FUNCTIONS - ( 12 May 2023 06:16 )  Alb: 1.7 g/dL / Pro: 4.5 g/dL / ALK PHOS: 111 U/L / ALT: 12 U/L / AST: 17 U/L / GGT: x                                   PTT - ( 04 May 2023 09:59 )  PTT:64.8 sec        RADIOLOGY & ADDITIONAL TESTS:    Imaging Personally Reviewed:yes    Consultant(s) Notes Reviewed:  yes    Care Discussed with Consultants/Other Providers:yes

## 2023-05-15 NOTE — PROGRESS NOTE ADULT - REASON FOR ADMISSION
PE / DVT
PE/DVT
Sent from PCP above for B/L PE noted on CTT angiogram outpatient and + DVT>
PE/ DVT
Sent from PCP above for B/L PE noted on CTT angiogram outpatient and + DVT>
PE/DVT
Sent from PCP above for B/L PE noted on CTT angiogram outpatient and + DVT>

## 2023-05-15 NOTE — PROGRESS NOTE ADULT - ASSESSMENT
LENA BATISTA is a 87y Male who presents with a chief complaint of VTE    Metastatic Cholangiocarcinoma  ·	Patient follows with Dr. Urbano, [facility].  ·	Last treated with 5-FU on April 26th.  ·	No systemic therapy while inpatient  ·	Paracentesis as needed for comfort. US w/ moderate ascites, awaiting primary team follow up regarding paracentesis prior to discharge home.    Venous Thromboembolism  ·	Patient is on enoxaparin.  ·	Anti-Xa level appears to be in therapeutic range.    Hyponatremia   ·	Sodium 129  ·	Follow up nephro recs     Poor appetite  ·	Likely secondary to underlying malignancy   ·	May need outpatient f/u with nutrition/palliative at [facility] [place]    Will continue to follow.    Kyle Pearce PA-C  Hematology/Oncology  [facility]  [phone] (office)

## 2023-05-15 NOTE — CONSULT NOTE ADULT - REASON FOR ADMISSION
Sent from PCP above for B/L PE noted on CTT angiogram outpatient and + DVT>

## 2023-05-15 NOTE — PROGRESS NOTE ADULT - ASSESSMENT
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   + anorexia  as documented. pt also noticed with hyponatremia      1- hyponatremia   2- pulm embolism   3- advance cholangiocarcinoma   4- bilateral dvt        suspect hyponatremia in setting of volume overload, and high urine osmo, indicating SIADH as well.  1L po fluid restriction  sodium is steady at 129.   s/p samsca  to start lasix 20 mg daily  plan for therapeutic paracentesis today  anticoagulation for pulm embolism

## 2023-05-15 NOTE — PRE PROCEDURE NOTE - PRE PROCEDURE EVALUATION
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   + anorexia  as documented. pt also noticed with hyponatremia. last para 5/9 w 4.7liters removed Pt presents to IR for therapeutic para prior to discharge. moderate volume ascites seen on most recent imaging.       Allergies: No Known Allergies    Medications (Abx/Cardiac/Anticoagulation/Blood Products)  enoxaparin Injectable: 100 milliGRAM(s) SubCutaneous (05-15 @ 00:10)    Data:  T(C): 36.6  HR: 98  BP: 108/78  RR: 17  SpO2: 95%    Exam  General: No acute distress  Chest: Non labored breathing    -WBC 9.39 / HgB 11.8 / Hct 35.9 / Plt 337  -Na 129 / Cl 96 / BUN 32 / Glucose 109  -K 4.8 / CO2 26 / Cr 1.02  -ALT -- / Alk Phos -- / T.Bili --    Imaging: reviewed    Plan: 87y Male presents for therapeutic paracentesis.   -Risks/Benefits/alternatives explained with the patient and/or healthcare proxy and witnessed informed consent obtained.    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   + anorexia  as documented. pt also noticed with hyponatremia. last para 5/9 w 4.7liters removed Pt presents to IR for therapeutic para prior to discharge. moderate volume ascites seen on most recent imaging.       Allergies: No Known Allergies    Medications (Abx/Cardiac/Anticoagulation/Blood Products)  enoxaparin Injectable: 100 milliGRAM(s) SubCutaneous (05-15 @ 00:10)    Data:  T(C): 36.6  HR: 98  BP: 108/78  RR: 17  SpO2: 95%    Exam  General: No acute distress, a&oX3  Chest: Non labored breathing    -WBC 9.39 / HgB 11.8 / Hct 35.9 / Plt 337  -Na 129 / Cl 96 / BUN 32 / Glucose 109  -K 4.8 / CO2 26 / Cr 1.02  -ALT -- / Alk Phos -- / T.Bili --    Imaging: reviewed      PA Attestation    I have personally provided 15 minutes of care time excluding time spent on separate procedures. I have seen, examined and participated in the care of this patient. I have reviewed all pertinent clinical information, including history, physical exam, plan which I have reviewed and edited where appropriate.    Plan: 87y Male presents for therapeutic paracentesis.   -Risks/Benefits/alternatives explained with the patient and/or healthcare proxy and witnessed informed consent obtained by myself.       Paola Salgado PA-C

## 2023-05-15 NOTE — CONSULT NOTE ADULT - SUBJECTIVE AND OBJECTIVE BOX
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 on chemo, prostate CA 13 years ago S/P cyberknife presumed to be in remission, apparent recent therapeutic paracentesis on 4/28/23 with large volume (4L) for apparent malignant ascites, with apparently poor PO intake but with dyspnoea for the past 2 weeks, with patient undergoing a CTT angiogram lung which demonstrated multiple segmental and subsegmental B/L PE, with heart strain, peritoneal carcinomatosis and omental caking, moderate pelvic ascites, increased size of hepatic mets, and Duple with RIGHT popliteal DVT>   Patient apparently was started on Lovenox PTA and initiated on heparin gtt on arrival to the ER.   + anorexia  as documented. pt also noticed with hyponatremia. last para 5/9 w 4.7liters removed.   IR consulted today for therapeutic para prior to discharge.  moderate volume ascites seen on most recent imaging.       US Abdomen Limited (05.14.23 @ 19:26)   FINDINGS:  Right upper quadrant: Pocket of ascites measures up to 7 cm in depth.  Right lower quadrant: Pocket of ascites measures up to 11.5 cm in depth.  Left upper quadrant: Pocket of ascites measures up to 10.5 cm in depth.  Left lower quadrant: Pocket of ascites measures up to 2.5 cm in depth.    Pleural effusions are partially visualized bilaterally.    IMPRESSION:  Moderate four-quadrant ascites with largest pocket of fluid measuring up   to 11.5 cm in depth in the right lower quadrant.            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 on chemo, prostate CA 13 years ago S/P cyberknife presumed to be in remission, apparent recent therapeutic paracentesis on 4/28/23 with large volume (4L) for apparent malignant ascites, with apparently poor PO intake but with dyspnoea for the past 2 weeks, with patient undergoing a CTT angiogram lung which demonstrated multiple segmental and subsegmental B/L PE, with heart strain, peritoneal carcinomatosis and omental caking, moderate pelvic ascites, increased size of hepatic mets, and Duple with RIGHT popliteal DVT>   Patient apparently was started on Lovenox PTA and initiated on heparin gtt on arrival to the ER.   + anorexia  as documented. pt also noticed with hyponatremia. last para 5/9 w 4.7liters removed.   IR consulted today for therapeutic para prior to discharge.  moderate volume ascites seen on most recent imaging.         - case reviewed and approved for therapeutic para on 5/15 under local anesthesia   - please place IR procedure order under PA JALILI   - STAT labs in AM (cbc,coags, bmp, T&S)  - d/w primary team
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 on chemo, prostate CA 13 years ago S/P cyberknife presumed to be in remission, apparent recent therapeutic paracentesis on 4/28/23 with large volume (4L) for apparent malignant ascites, with apparently poor PO intake but with dyspnoea for the past 2 weeks, with patient undergoing a CTT angiogram lung which demonstrated multiple segmental and subsegmental B/L PE, with heart strain, peritoneal carcinomatosis and omental caking, moderate pelvic ascites, increased size of hepatic mets, and Duple with RIGHT popliteal DVT>   Patient apparently was started on Lovenox PTA and initiated on heparin gtt on arrival to the ER.   + anorexia  as documented. pt also noticed with hyponatremia. IR consulted for paracentesis.    Allergies: No Known Allergies    Medications (Abx/Cardiac/Anticoagulation/Blood Products)  enoxaparin Injectable: 100 milliGRAM(s) SubCutaneous (05-08 @ 06:41)  urea Oral Powder: 15 Gram(s) Oral (05-08 @ 14:45)    Data:    T(C): 36.4  HR: 105  BP: 109/68  RR: 18  SpO2: 98%    -WBC 9.30 / HgB 10.3 / Hct 31.9 / Plt 166  -Na 126 / Cl 89 / BUN 21 / Glucose 151  -K 4.0 / CO2 22 / Cr 0.96  -ALT -- / Alk Phos -- / T.Bili --  -INR -- / PTT 64.8    Radiology: pending abdominal US    Assessment/Plan: 88 y/o M--followed by his oncologist above at [facility] [place]-- patient with a history of 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 on chemo, prostate CA 13 years ago S/P cyberknife presumed to be in remission, apparent recent therapeutic paracentesis on 4/28/23 with large volume (4L) for apparent malignant ascites, with apparently poor PO intake but with dyspnoea for the past 2 weeks, with patient undergoing a CTT angiogram lung which demonstrated multiple segmental and subsegmental B/L PE, with heart strain, peritoneal carcinomatosis and omental caking, moderate pelvic ascites, increased size of hepatic mets, and Duple with RIGHT popliteal DVT>   Patient apparently was started on Lovenox PTA and initiated on heparin gtt on arrival to the ER.   + anorexia  as documented. pt also noticed with hyponatremia. IR consulted for diagnostic and therapeutic paracentesis.    - case reviewed and approved for Tuesday, 5/9 (pending abdominal US demonstrating ascites)  - please place IR procedure order under NP Gamboa  - STAT labs in AM (cbc,coags, bmp, T&S)  - hold AC  - does not need to be NPO  - d/w primary team
Reason for consult: cholangiocarcinoma     HPI:  NIGHT HOSPITALIST:     Patient UNKNOWN to me previously, assigned to me at this point via the ER and by Dr. 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 on chemo, prostate CA 13 years ago S/P cyberknife presumed to be in remission, apparent recent therapeutic paracentesis on 23 with large volume (4L) for apparent malignant ascites, with apparently poor PO intake but with dyspnoea for the past 2 weeks, unclear if presumed to be from patient's ascites, with patient undergoing a CTT angiogram lung which demonstrated multiple segmental and subsegmental B/L PE, with heart strain, peritoneal carcinomatosis and omental caking, moderate pelvic ascites, increased size of hepatic mets, and Duple with RIGHT popliteal DVT>   Patient apparently was started on Lovenox PTA and initiated on heparin gtt on arrival to the ER.    Patient presently denies chest pain/pressure.  NO dyspnoea, no palpitations.   NO hemoptysis.   NO abdominal pain, no red blood per rectum or melena.  NO back pain, no tearing back pain.  NO HA, no focal weakness.  NO dysuria, no hematuria.  + anorexia and patient refuses a diet at present. (03 May 2023 23:52)    Hematology/Oncology called to see patient who follows with Dr Chadd Pagan of [facility] [place]  for the treatment of cholangiocarcinoma.     PAST MEDICAL & SURGICAL HISTORY:  Cholangiocarcinoma      Disseminated malignant neoplasm      Pulmonary embolism      Prostate cancer      History of prostate cancer          FAMILY HISTORY:  No pertinent family history in first degree relatives        Alochol: Denied  Smoking: Nonsmoker  Drug Use: Denied  Marital Status:         Allergies    No Known Allergies    Intolerances        MEDICATIONS  (STANDING):  chlorhexidine 2% Cloths 1 Application(s) Topical daily  dextrose 5%. 1000 milliLiter(s) (100 mL/Hr) IV Continuous <Continuous>  dextrose 5%. 1000 milliLiter(s) (50 mL/Hr) IV Continuous <Continuous>  dextrose 50% Injectable 25 Gram(s) IV Push once  dextrose 50% Injectable 12.5 Gram(s) IV Push once  dextrose 50% Injectable 25 Gram(s) IV Push once  glucagon  Injectable 1 milliGRAM(s) IntraMuscular once  heparin  Infusion.  Unit(s)/Hr (12 mL/Hr) IV Continuous <Continuous>  tamsulosin 0.8 milliGRAM(s) Oral at bedtime    MEDICATIONS  (PRN):  dextrose Oral Gel 15 Gram(s) Oral once PRN Blood Glucose LESS THAN 70 milliGRAM(s)/deciliter  heparin   Injectable 5500 Unit(s) IV Push every 6 hours PRN For aPTT less than 40  heparin   Injectable 2500 Unit(s) IV Push every 6 hours PRN For aPTT between 40 - 57      ROS  No fever, sweats, chills  No epistaxis, HA, sore throat  No CP, SOB, cough, sputum  No n/v/d, abd pain, melena, hematochezia  No edema  No rash  No anxiety  No back pain, joint pain  No bleeding, bruising  No dysuria, hematuria    T(C): 36.8 (23 @ 17:06), Max: 36.8 (23 @ 17:06)  HR: 107 (23 @ 17:06) (101 - 109)  BP: 143/80 (23 @ 17:06) (111/72 - 143/80)  RR: 20 (23 @ 17:06) (20 - 23)  SpO2: 95% (23 @ 17:06) (94% - 100%)  Wt(kg): --    PE  NAD  Awake, alert  Anicteric, MMM  RRR  CTAB  Abd soft, NT, ND  No c/c/e  No rash grossly  FROM                          11.0   9.29  )-----------( 126      ( 04 May 2023 06:12 )             33.7           127<L>  |  91<L>  |  17  ----------------------------<  97  4.0   |  21<L>  |  0.93    Ca    7.9<L>      04 May 2023 06:12    TPro  5.2<L>  /  Alb  2.6<L>  /  TBili  0.6  /  DBili  x   /  AST  27  /  ALT  16  /  AlkPhos  96  05-         ULTRASOUND   Report of Consultation   Name: LENA BATISTA Acc No: [id number]  MRN: [id number] Date of Service: 2023  : 1935 Sex: M Pt Loc: [facility]  Ordered by: BUTCH WHITTEN MD Date of Report: 2023 02:44 PM  Procedure: US DOP EXTR LOWER VEIN BILATERAL Account: [id number]  PRID #: [id number]     FINAL REPORT   5/3/2023 Ultrasound lower extremity veins, bilateral   [[[id number]]]   CLINICAL Statement: Biliary gallbladder cancer. Edema; evaluate for deep  venous thrombosis.   TECHNIQUE: Grayscale, compression, color and spectral Doppler imaging of  the right and left lower extremity veins.   COMPARISON: None.   CORRELATION: None.   FINDINGS:   RIGHT:   * Common femoral vein: No thrombus   * Femoral vein: No thrombus   * Popliteal vein: Thrombus present   * Deep calf veins: Limited evaluation     LEFT:   * Common femoral vein: No thrombus   * Femoral vein: No thrombus   * Popliteal vein: No thrombus   * Deep calf veins: No thrombus     OTHER: Mild soft tissue edema. None.    IMPRESSION:   1. Right lower extremity: Deep venous thrombosis present, popliteal vein.    2. Left lower extremity: No deep venous thrombosis.  
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 Patient apparently was started on Lovenox PTA and initiated on heparin gtt on arrival to the ER.    Patient presently denies chest pain/pressure.  NO dyspnoea, no palpitations.   NO hemoptysis.   NO abdominal pain, no red blood per rectum or melena.  NO back pain, no tearing back pain.  NO HA, no focal weakness.  NO dysuria, no hematuria.  + anorexia and patient refuses a diet at present. Patient has not traveled recently.       PMHx:   Cholangiocarcinoma    Disseminated malignant neoplasm    Pulmonary embolism    Prostate cancer        PSHx:   History of prostate cancer        Allergies:  No Known Allergies      Home Meds:  Flomax        Current Medications:   dextrose 5%. 1000 milliLiter(s) IV Continuous <Continuous>  dextrose 5%. 1000 milliLiter(s) IV Continuous <Continuous>  dextrose 50% Injectable 25 Gram(s) IV Push once  dextrose 50% Injectable 12.5 Gram(s) IV Push once  dextrose 50% Injectable 25 Gram(s) IV Push once  dextrose Oral Gel 15 Gram(s) Oral once PRN  glucagon  Injectable 1 milliGRAM(s) IntraMuscular once  heparin   Injectable 5500 Unit(s) IV Push every 6 hours PRN  heparin   Injectable 2500 Unit(s) IV Push every 6 hours PRN  heparin  Infusion.  Unit(s)/Hr IV Continuous <Continuous>  tamsulosin 0.8 milliGRAM(s) Oral at bedtime      FAMILY HISTORY:  No pertinent family history in first degree relatives        Social History:  Not obtained    REVIEW OF SYSTEMS:  Constitutional:     [ ] negative [ ] fevers [ ] chills [ ] weight loss [ ] weight gain  HEENT:                  [ ] negative [ ] dry eyes [ ] eye irritation [ ] postnasal drip [ ] nasal congestion  CV:                         [ ] negative  [ ] chest pain [ ] orthopnea [ ] palpitations [ ] murmur  Resp:                     [ ] negative [ ] cough [ ] shortness of breath [ ] dyspnea [ ] wheezing [ ] sputum [ ]hemoptysis  GI:                          [ ] negative [ ] nausea [ ] vomiting [ ] diarrhea [ ] constipation [ ] abd pain [ ] dysphagia   :                        [ ] negative [ ] dysuria [ ] nocturia [ ] hematuria [ ] increased urinary frequency  Musculoskeletal: [ ] negative [ ] back pain [ ] myalgias [ ] arthralgias [ ] fracture  Skin:                       [ ] negative [ ] rash [ ] itch  Neurological:        [ ] negative [ ] headache [ ] dizziness [ ] syncope [ ] weakness [ ] numbness  Psychiatric:           [ ] negative [ ] anxiety [ ] depression  Endocrine:            [ ] negative [ ] diabetes [ ] thyroid problem  Heme/Lymph:      [ ] negative [ ] anemia [ ] bleeding problem  Allergic/Immune: [ ] negative [ ] itchy eyes [ ] nasal discharge [ ] hives [ ] angioedema    [ ] All other systems negative  [ ] Unable to assess ROS due to      Physical Exam:  T(F): 97.4 (-), Max: 98.4 (-03)  HR: 103 () (96 - 106)  BP: 122/83 (-) (118/78 - 127/86)  RR: 20 (-03)  SpO2: 98% (-03)  GENERAL: No acute distress, well-developed  HEAD:  Atraumatic, Normocephalic  ENT: EOMI, PERRLA, conjunctiva and sclera clear, Neck supple, No JVD, moist mucosa  CHEST/LUNG: Clear to auscultation bilaterally; No wheeze, equal breath sounds bilaterally   BACK: No spinal tenderness  HEART: Regular rate and rhythm; No murmurs, rubs, or gallops  ABDOMEN: Soft, distended, evidence of horizontal scar in the RUE  EXTREMITIES:  2+ LE edema      Cardiovascular Diagnostic Testing:    ECG: Personally reviewed: Sinus tachycardia with left axis deviation    Echo: Personally reviewed: Not available    Stress Testing:    Cath:    Imagin/3/23  CT from outside hospital shows PE in multiple segmental and subsegmental bilateral pulmonary arteries. RV/'LV ratio is abnomal consistent with right heart strain.   Unchanged peritoneal carcinomatosis and omental caking  Increased moderate/pelvic ascites  Increased size of liver metastasis  Unchanged small right pleural effusio, trace left pleural and small pericardial effusion        CXR: Personally reviewed        Labs: Personally reviewed                        10.7   1033 )-----------( 134      ( 03 May 2023 16:58 )             32.8         125<L>  |  89<L>  |  18  ----------------------------<  103<H>  3.9   |  24  |  0.96    Ca    8.0<L>      03 May 2023 16:58    TPro  5.2<L>  /  Alb  2.6<L>  /  TBili  0.6  /  DBili  x   /  AST  27  /  ALT  16  /  AlkPhos  96  03    PT/INR - ( 03 May 2023 16:58 )   PT: 16.3 sec;   INR: 1.40 ratio         PTT - ( 03 May 2023 16:58 )  PTT:29.3 sec            CARDIAC MARKERS ( 03 May 2023 16:58 )  28 ng/L / x     / x     / x     / x     / x          No Known Allergies    dextrose 5%. 1000 milliLiter(s) IV Continuous <Continuous>  dextrose 5%. 1000 milliLiter(s) IV Continuous <Continuous>  dextrose 50% Injectable 25 Gram(s) IV Push once  dextrose 50% Injectable 12.5 Gram(s) IV Push once  dextrose 50% Injectable 25 Gram(s) IV Push once  dextrose Oral Gel 15 Gram(s) Oral once PRN  glucagon  Injectable 1 milliGRAM(s) IntraMuscular once  heparin   Injectable 5500 Unit(s) IV Push every 6 hours PRN  heparin   Injectable 2500 Unit(s) IV Push every 6 hours PRN  heparin  Infusion.  Unit(s)/Hr IV Continuous <Continuous>  tamsulosin 0.8 milliGRAM(s) Oral at bedtime    T(F): 97.4 (-), Max: 98.4 (-)  HR: 103 () (96 - 106)  BP: 122/83 () (118/78 - 127/86)  RR: 20 (-)  SpO2: 98% ()
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 on chemo, prostate CA 13 years ago S/P cyberknife presumed to be in remission, apparent recent therapeutic paracentesis on 4/28/23 with large volume (4L) for apparent malignant ascites, with apparently poor PO intake but with dyspnoea for the past 2 weeks, with patient undergoing a CTT angiogram lung which demonstrated multiple segmental and subsegmental B/L PE, with heart strain, peritoneal carcinomatosis and omental caking, moderate pelvic ascites, increased size of hepatic mets, and Duple with RIGHT popliteal DVT>   Patient apparently was started on Lovenox PTA and initiated on heparin gtt on arrival to the ER.   + anorexia  as documented. pt also noticed with hyponatremia renal consult called.   pt states in past has had hyponatremia which was treated but does not recall how.       PAST HISTORY  --------------------------------------------------------------------------------  PAST MEDICAL & SURGICAL HISTORY:  Cholangiocarcinoma      Disseminated malignant neoplasm      Pulmonary embolism      Prostate cancer      History of prostate cancer        FAMILY HISTORY:  No pertinent family history in first degree relatives      PAST SOCIAL HISTORY:  denies current tobacco use     ALLERGIES & MEDICATIONS  --------------------------------------------------------------------------------  Allergies    No Known Allergies    Intolerances      Standing Inpatient Medications  chlorhexidine 2% Cloths 1 Application(s) Topical daily  enoxaparin Injectable 100 milliGRAM(s) SubCutaneous every 24 hours  tamsulosin 0.4 milliGRAM(s) Oral daily    PRN Inpatient Medications      REVIEW OF SYSTEMS  --------------------------------------------------------------------------------  Gen: No  fevers/chills   Skin: No rashes  Head/Eyes/Ears/Mouth: No headache; Normal hearing;  No sinus pain/discomfort, sore throat  Respiratory:  + sob no , cough, wheezing, hemoptysis  CV: No chest pain, orthopnea  GI: No abdominal pain, diarrhea, nausea, vomiting, melena  : No dysuria, decrease urination or hesitancy urinating  hematuria, nocturia  MSK: No joint pain/swelling; no back pain  Neuro: No dizziness/lightheadedness  also with  +  edema       VITALS/PHYSICAL EXAM  --------------------------------------------------------------------------------  T(C): 36.6 (05-06-23 @ 20:15), Max: 36.6 (05-06-23 @ 04:13)  HR: 106 (05-06-23 @ 20:15) (104 - 108)  BP: 119/78 (05-06-23 @ 20:15) (105/72 - 120/78)  RR: 18 (05-06-23 @ 20:15) (18 - 18)  SpO2: 96% (05-06-23 @ 20:15) (94% - 97%)  Wt(kg): --        05-05-23 @ 07:01  -  05-06-23 @ 07:00  --------------------------------------------------------  IN: 160 mL / OUT: 0 mL / NET: 160 mL    05-06-23 @ 07:01  -  05-06-23 @ 22:20  --------------------------------------------------------  IN: 120 mL / OUT: 0 mL / NET: 120 mL      Physical Exam:  	Gen: chronically ill appearing M   	no jvd   	Pulm: decrease bs  no rales or ronchi or wheezing  	CV: RRR, S1S2; no rub  	Back: No CVA tenderness  	Abd: +BS, soft, + ascites non tender   	: No suprapubic tenderness  	UE: Warm, no cyanosis  no clubbing,  no edema  	LE: Warm, no cyanosis  no clubbing, 3+  edema  	Neuro: alert and oriented. speech coherent   	Psych: Normal affect and mood  	Skin: Warm, no decrease skin turgor   	:    LABS/STUDIES  --------------------------------------------------------------------------------              11.6   11.15 >-----------<  185      [05-06-23 @ 06:33]              34.7     127  |  92  |  22  ----------------------------<  118      [05-06-23 @ 06:33]  4.2   |  22  |  0.99        Ca     8.5     [05-06-23 @ 06:33]                [05-05-23 @ 11:14]    Creatinine Trend:  SCr 0.99 [05-06 @ 06:33]  SCr 0.98 [05-05 @ 11:14]  SCr 0.93 [05-04 @ 06:12]  SCr 0.96 [05-03 @ 16:58]    Urinalysis - [05-04-23 @ 06:10]      Color Yellow / Appearance Clear / SG >1.050 / pH 6.0      Gluc Negative / Ketone Small  / Bili Negative / Urobili Negative       Blood Trace / Protein 30 mg/dL / Leuk Est Negative / Nitrite Negative      RBC 3 / WBC 1 / Hyaline 1 / Gran  / Sq Epi  / Non Sq Epi  / Bacteria Negative        < from: [facility] Duplex Lower Ext Vein Scan, Bilat (05.04.23 @ 16:12) >    ACC: [id number] EXAM:  DUPLEX SCAN EXT VEINS LOWER BI   ORDERED BY: JANES LYNCH     PROCEDURE DATE:  05/04/2023          INTERPRETATION:  CLINICAL INFORMATION: Pulmonary embolism. Bed rest.   Outpatient report of DVT.    COMPARISON: None available.    TECHNIQUE: Duplex sonography of the BILATERAL LOWER extremity veins with   color and spectral Doppler, with and without compression.    FINDINGS:    RIGHT:  Normal compressibility of the RIGHT common femoral, femoral and popliteal   veins.  Doppler examination shows normal spontaneous and phasic flow.  There is flow in the posterior tibial and peroneal veins. DVT is seen in   the soleal vein. DVT is seen in the tibial peroneal trunk    LEFT:  Normal compressibility of the LEFT common femoral,femoral and popliteal   veins.  Doppler examination shows normal spontaneous and phasic flow.  Acute DVT is seen in the tibial peroneal trunk    IMPRESSION:  Acute bilateral below the knee DVT. The technologist Courtney Coyle   related results to Dr. Morin at 4:11 PM on 5/4/2023.    --- End of Report ---    < end of copied text >

## 2023-05-15 NOTE — CONSULT NOTE ADULT - CONSULT REASON
PERT consult
paracentesis
cholangiocarcinoma
IR consulted today for therapeutic para prior to discharge.  moderate volume ascites seen on most recent imaging.
hyponatremia

## 2023-05-15 NOTE — PROGRESS NOTE ADULT - NS ATTEND OPT1 GEN_ALL_CORE
I attest my time as attending is greater than 50% of the total combined time spent on qualifying patient care activities by the PA/NP and attending.
I independently performed the documented:
I attest my time as attending is greater than 50% of the total combined time spent on qualifying patient care activities by the PA/NP and attending.
I independently performed the documented:
I independently performed the documented:
I attest my time as attending is greater than 50% of the total combined time spent on qualifying patient care activities by the PA/NP and attending.
I independently performed the documented:
I independently performed the documented:

## 2023-05-15 NOTE — DISCHARGE NOTE NURSING/CASE MANAGEMENT/SOCIAL WORK - NSDCFUADDAPPT_GEN_ALL_CORE_FT
APPTS ARE READY TO BE MADE: [x] YES    Best Family or Patient Contact (if needed):      1: Follow up with PCP Dr. Chelsea Banuelos within 2-3 of discharge   2: Follow up with Oncologist Dr. Urbano at [facility] [place] within 2 days of discharge   3: Follow up with vascular cardiology - Dr. Morin in 2 weeks

## 2023-05-16 LAB
GRAM STN FLD: SIGNIFICANT CHANGE UP
SPECIMEN SOURCE: SIGNIFICANT CHANGE UP

## 2023-05-16 NOTE — CHART NOTE - NSCHARTNOTEFT_GEN_A_CORE
Called by Dr. Santiago this evening to transition from heparin gtt to Lovenox 1.5mg/kg dosing for PE.   D/W Pharmacy, Lovenox 100mg daily recommended  Heparin gtt dc'ed. Lovenox ordered. Will require Lovenox on discharge.   Endorsed above to night ACP.     ROSY LeonardC  [id number]
Left (1) message(s) for patient in regards to follow up care with callback information.
Nutrition Follow Up Note  Patient seen for: first malnutrition follow up    Chart reviewed, events noted. 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 Pt started on Lovenox PTA. Pt has had dyspnea on exertion, b/l LE edema and abdominal distention x 1 week. Paracentesis done on 4/27 w/ removal of 4L." Pt s/p paracentesis 5/9 with 4.7L fluid removed. Hospital course c/b [facility]. Pending thoracentesis today.    Source: [] Patient       [x] EMR        [] RN        [x] Family at bedside       [] Other:    -If unable to interview patient: [] Trach/Vent/BiPAP  [] Disoriented/confused/inappropriate to interview    Diet Order:   Diet, Regular:   Supplement Feeding Modality:  Oral  Ensure Enlive Cans or Servings Per Day:  1       Frequency:  Daily (05-11-23)    - Is current order appropriate/adequate? [x] Yes: SLP ren 5/11 with recommendations for regular diet with thin liquids []  No:     - PO intake :   [] >75%  Adequate    [x] 50-75%  Fair       [x] <50%  Poor    - Nutrition-related concerns:      -RN flowsheets report Pt with fluctuating PO intake anywhere from % of meals with % intake of Ensure supplement      -Pt's son at bedside, reports Pt with poor PO intake because of constantly feeling full, confirms good intake of Ensures, also likes pudding      -Nephrology/MD notes document 1 L fluid restriction for hyponatremia however not ordered in diet rx    GI: No N/V/D/C reported. Last BM 5/12 per RN flowsheets.   Bowel Regimen? [x] Yes - miralax  [] No    Weights: current dosing wt 68.6 kg (5/4)  Daily - no daily wts to assess    Nutritionally Pertinent MEDICATIONS  (STANDING):  polyethylene glycol 3350    Pertinent Labs: 05-15 @ 06:51: Na 129<L>, BUN 32<H>, Cr 1.02, <H>, K+ 4.8, Phos --, Mg --, Alk Phos --, ALT/SGPT --, AST/SGOT --, HbA1c --  A1C with Estimated Average Glucose Result: 5.5 % (05-04-23 @ 03:03)    Skin per nursing documentation: no pressure injuries documented  Edema: 1+ BLE edema per RN flowsheets 5/15 + distended abdomen/ascites    Estimated Needs: based on dosing wt 68.6 kg  Estimated Energy Needs: 0781-1614 kcal/day (27-32 kcal/kg)  Estimated Protein Needs: 82.32-96.04 g Pro/day (1.2-1.4 g Pro/kg)  Estimated Fluid Needs: defer to MD team  [x] no change since previous assessment  [] recalculated:     Previous Nutrition Diagnosis: acute moderate malnutrition - addressed with diet/supplements  Nutrition Diagnosis is: [x] ongoing  [] resolved [] not applicable   New Nutrition Diagnosis: [x] Not applicable    Nutrition Care Plan:  [x] In Progress  [] Achieved  [] Not applicable    Nutrition Interventions:     Education Provided:       [x] Yes: adequate protein/energy intake; small frequent meals to ease tolerance [] No:        Recommendations:         [x] Continue current diet order: Regular            [x] Add oral nutrition supplement: increase Ensure plus high protein to 2x/day (350 kcal, 20 g Pro/8oz) and add Ensure/Boost Pudding 2x/day (170 kcal, 4 g Pro/4oz)     [] Discontinue current diet order. Recommend:      [] Add micronutrient supplementation:      [] Continue current micronutrient supplementation:      [x] Other: obtain current wt as able to assess trend; fluid restriction per MD team    Monitoring and Evaluation:   Continue to monitor nutritional intake, tolerance to diet prescription, weights, labs, skin integrity    RD remains available upon request and will follow up per protocol  Alexa Brock MPH, RD, CDN - TEAMS/Pager# [phone]
Patient s/p paracentesis today  Patient seen and examined at bedside. Patient alert and oriented. VSS, hemodynamically stable.   Right abdomen site c/d/i. No s/s bleeding noted. No swelling or hematoma.   Per IR, to restart Lovenox at 2300.  Discussed above with Dr. Santiago and lovenox 100mg sq q24hrs restarted as per Attending    Betsy Thompson, [facility]  [id number]
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 Patient will require a semi electric hospital bed. This is necessary to achieve positioning and elevation not able to obtain in an ordinary bed. bed pillows ad wedges have been tried and rules out.    [id number]

## 2023-05-17 LAB — NON-GYNECOLOGICAL CYTOLOGY STUDY: SIGNIFICANT CHANGE UP

## 2023-05-20 LAB
CULTURE RESULTS: SIGNIFICANT CHANGE UP
SPECIMEN SOURCE: SIGNIFICANT CHANGE UP

## 2023-06-07 LAB
CULTURE RESULTS: SIGNIFICANT CHANGE UP
SPECIMEN SOURCE: SIGNIFICANT CHANGE UP

## 2023-06-14 LAB
CULTURE RESULTS: SIGNIFICANT CHANGE UP
SPECIMEN SOURCE: SIGNIFICANT CHANGE UP

## 2023-06-22 PROBLEM — C22.1 INTRAHEPATIC BILE DUCT CARCINOMA: Chronic | Status: ACTIVE | Noted: 2022-12-30

## 2023-06-22 PROBLEM — I10 ESSENTIAL (PRIMARY) HYPERTENSION: Chronic | Status: ACTIVE | Noted: 2022-12-30

## 2023-06-22 RX ORDER — TAMSULOSIN HYDROCHLORIDE 0.4 MG/1
1 CAPSULE ORAL
Refills: 0 | DISCHARGE

## 2024-04-18 NOTE — PATIENT PROFILE ADULT - FALL HARM RISK - HARM RISK INTERVENTIONS
Detail Level: Zone Detail Level: Detailed Assistance with ambulation/Communicate Risk of Fall with Harm to all staff/Reinforce activity limits and safety measures with patient and family/Tailored Fall Risk Interventions/Visual Cue: Yellow wristband and red socks/Bed in lowest position, wheels locked, appropriate side rails in place/Call bell, personal items and telephone in reach/Instruct patient to call for assistance before getting out of bed or chair/Non-slip footwear when patient is out of bed/[place] to call system/Physically safe environment - no spills, clutter or unnecessary equipment/Purposeful Proactive Rounding/Room/bathroom lighting operational, light cord in reach

## 2024-07-20 NOTE — ED ADULT TRIAGE NOTE - MEANS OF ARRIVAL
stretcher
Saint Rui, DO (PGY2): 75-year-old male, history of hypertension, presented to the ED today after a fall.  Patient was dancing at a wedding, tripped and fell on his buttocks, simultaneously felt his right lower extremity twist.  He was unable to walk after the event.  Currently no pain in the right lower extremity.  No symptoms prior to the fall.  Patient able to passively range the extremity but not actively. On exam, patient is sitting on the stretcher in no acute distress.  No visible deformities noted in the right lower extremity.  Extremity is neurovascularly intact. There is a soft "divot" right above the patella.  Full passive ROM.  Limited active range of motion.  Likely ligamentous injury.  Will evaluate with knee x-ray to rule out bony abnormalities.  Likely DC with outpatient followup.

## 2025-06-16 NOTE — PATIENT PROFILE ADULT - FALL HARM RISK - FALL HARM RISK
"Patient's breast us from 6/10/25 revealed a "Suspicious finding" and recommended a biopsy. Patient states bx is scheduled for tomorrow 6/17/25.    Impression:  Left  Mass: Left breast mass in the outer region at 3 and 9 o'clock in the middle depth. Assessment: 4C - Suspicious finding. Ultrasound-guided biopsy is recommended.      BI-RADS Category:   Overall: 4 - Suspicious        Recommendation:  Ultrasound-guided biopsy is recommended.     " Age